# Patient Record
Sex: MALE | Race: WHITE | NOT HISPANIC OR LATINO | Employment: FULL TIME | URBAN - METROPOLITAN AREA
[De-identification: names, ages, dates, MRNs, and addresses within clinical notes are randomized per-mention and may not be internally consistent; named-entity substitution may affect disease eponyms.]

---

## 2017-05-08 ENCOUNTER — OFFICE VISIT (OUTPATIENT)
Dept: OTOLARYNGOLOGY | Facility: CLINIC | Age: 42
End: 2017-05-08
Payer: COMMERCIAL

## 2017-05-08 VITALS — BODY MASS INDEX: 31.34 KG/M2 | HEART RATE: 95 BPM | WEIGHT: 220 LBS | OXYGEN SATURATION: 95 %

## 2017-05-08 DIAGNOSIS — T16.2XXA FOREIGN BODY IN EAR, LEFT, INITIAL ENCOUNTER: Primary | ICD-10-CM

## 2017-05-08 PROCEDURE — 69200 CLEAR OUTER EAR CANAL: CPT | Mod: LT | Performed by: OTOLARYNGOLOGY

## 2017-05-08 PROCEDURE — 99202 OFFICE O/P NEW SF 15 MIN: CPT | Mod: 25 | Performed by: OTOLARYNGOLOGY

## 2017-05-08 RX ORDER — OFLOXACIN 3 MG/ML
SOLUTION/ DROPS OPHTHALMIC
Qty: 1 BOTTLE | Refills: 0 | Status: SHIPPED | OUTPATIENT
Start: 2017-05-08 | End: 2018-03-21

## 2017-05-08 RX ORDER — TOBRAMYCIN AND DEXAMETHASONE 3; 1 MG/ML; MG/ML
SUSPENSION/ DROPS OPHTHALMIC
Qty: 1 BOTTLE | Refills: 0 | Status: SHIPPED | OUTPATIENT
Start: 2017-05-08 | End: 2017-05-22

## 2017-05-08 NOTE — NURSING NOTE
"Chief Complaint   Patient presents with     Consult     Ent Problem     Bug in left ear       Initial Pulse 95  Wt 99.8 kg (220 lb)  SpO2 95%  BMI 31.34 kg/m2 Estimated body mass index is 31.34 kg/(m^2) as calculated from the following:    Height as of 1/11/16: 1.784 m (5' 10.25\").    Weight as of this encounter: 99.8 kg (220 lb).  Medication Reconciliation: complete  "

## 2017-05-08 NOTE — PROGRESS NOTES
Patient is here because of a foreign body in ear canal. It was first noticed 2 days ago. Symptoms include pain, plugged and ringing in the ears.  Attempts to remove it by using olive oil have failed. Patient would like to have this removed. Procedure and risks were explained.     He also complains of bilateral tinnitus.  He has noticed this getting worse over the last few months.  He does not have work exposure to loud noises.  He does not feel his hearing has decreased.    EXAM:   Alert, well hydrated, nontoxic appearing individual in no acute distress.  Ear L: foreign body: Fly in ear.  Left TM is inflamed.    Right ear is normal.    ASSESSMENT: Foreign body in ear canal    Anesthesia: none  Procedure: Area was visualized.   Foreign body removed by suctioning.  Patient tolerated procedure well.    Follow-up as needed.    We started him on 2 drops today due to the inflammation of his TM.  He will follow up in 2 weeks with a hearing test.  Advised him to discontinue Ibuprofen and Caffeine for the tinnitus,    Progress note was partially captured by Gloria Deelon MA and reviewed/addended by Dr. Arredondo for accuracy and content.      Niall Arredondo MD

## 2017-05-17 DIAGNOSIS — H90.8 MIXED HEARING LOSS: Primary | ICD-10-CM

## 2017-05-22 ENCOUNTER — OFFICE VISIT (OUTPATIENT)
Dept: AUDIOLOGY | Facility: CLINIC | Age: 42
End: 2017-05-22
Payer: COMMERCIAL

## 2017-05-22 ENCOUNTER — OFFICE VISIT (OUTPATIENT)
Dept: OTOLARYNGOLOGY | Facility: CLINIC | Age: 42
End: 2017-05-22
Payer: COMMERCIAL

## 2017-05-22 VITALS — OXYGEN SATURATION: 96 % | HEART RATE: 82 BPM | BODY MASS INDEX: 31.63 KG/M2 | WEIGHT: 222 LBS

## 2017-05-22 DIAGNOSIS — H90.3 SENSORINEURAL HEARING LOSS, ASYMMETRICAL: Primary | ICD-10-CM

## 2017-05-22 DIAGNOSIS — H90.3 ASYMMETRICAL SENSORINEURAL HEARING LOSS: ICD-10-CM

## 2017-05-22 DIAGNOSIS — H93.13 BILATERAL TINNITUS: ICD-10-CM

## 2017-05-22 DIAGNOSIS — H93.13 TINNITUS OF BOTH EARS: Primary | ICD-10-CM

## 2017-05-22 PROCEDURE — 92557 COMPREHENSIVE HEARING TEST: CPT | Performed by: AUDIOLOGIST

## 2017-05-22 PROCEDURE — 99213 OFFICE O/P EST LOW 20 MIN: CPT | Performed by: OTOLARYNGOLOGY

## 2017-05-22 PROCEDURE — 92567 TYMPANOMETRY: CPT | Performed by: AUDIOLOGIST

## 2017-05-22 NOTE — PROGRESS NOTES
History of Present Illness - Raffaele Guzman is a 41 year old male presenting in clinic today for a recheck on Patient presents with:  RECHECK: Follow up on bug in left ear.       Present Symptoms include: otolgia and tinnitis and they are   getting better. He does still have ringing in ears that has been going on for 1+ months    He has noise exposure at work.  He has bilateral tinnitus.       Past Medical History -   Past Medical History:   Diagnosis Date     Allergy to mold spores     4/16/13 [facility] pos. for: DM/M/G/RW     Diagnostic skin and sensitization tests 4/16/13 [facility] pos. for: DM/M/G/RW     House dust mite allergy      Hypovitaminosis D     noted 4/16/13, put on Rx     Moderate persistent asthma      Nasal polyp     noted on left side.     Seasonal allergic conjunctivitis      Seasonal allergic rhinitis        Current Medications -   Current Outpatient Prescriptions:      ofloxacin (OCUFLOX) 0.3 % ophthalmic solution, 5 drops in left ear once daily, Disp: 1 Bottle, Rfl: 0     albuterol (VENTOLIN HFA) 108 (90 BASE) MCG/ACT inhaler, Inhale 2 puffs into the lungs every 6 hours as needed for shortness of breath / dyspnea or wheezing, Disp: 1 Inhaler, Rfl: 1     naproxen (NAPROSYN) 500 MG tablet, Take 1 tablet (500 mg) by mouth 2 times daily (with meals), Disp: 30 tablet, Rfl: 0    Allergies -   Allergies   Allergen Reactions     Aspirin Unknown     As a kid       Social History -   Social History     Social History     Marital status: Single     Spouse name: N/A     Number of children: N/A     Years of education: N/A     Social History Main Topics     Smoking status: Former Smoker     Quit date: 4/16/2003     Smokeless tobacco: Never Used     Alcohol use Yes      Comment: occ. use     Drug use: No      Comment: marijuana     Sexual activity: Yes     Partners: Female     Birth control/ protection: None     Other Topics Concern     Parent/Sibling W/ Cabg, Mi Or Angioplasty Before 65f 55m? No     Social History  Narrative       Family History - No family history on file.    Review of Systems - As per HPI and PMHx, otherwise review of system review of the head and neck negative.    Physical Exam  Pulse 82  Wt 100.7 kg (222 lb)  SpO2 96%  BMI 31.63 kg/m2  BMI: Body mass index is 31.63 kg/(m^2).    General - The patient is well nourished and well developed, and appears to have good nutritional status.  Alert and oriented to person and place, answers questions and cooperates with examination appropriately.    SKIN - No suspicious lesions or rashes.  Respiration - No respiratory distress.     Head and Face - Normocephalic and atraumatic, with no gross asymmetry noted of the contour of the facial features.  The facial nerve is intact, with strong symmetric movements.    Voice and Breathing - The patient was breathing comfortably without the use of accessory muscles. There was no wheezing, stridor, or stertor.  The patients voice was clear and strong, and had appropriate pitch and quality.    Ears - Bilateral pinna and EACs with normal appearing overlying skin. Tympanic membrane intact with good mobility on pneumatic otoscopy bilaterally. Bony landmarks of the ossicular chain are normal. The tympanic membranes are normal in appearance. No retraction, perforation, or masses.  No fluid or purulence was seen in the external canal or the middle ear.     Eyes - Extraocular movements intact.  Sclera were not icteric or injected, conjunctiva were pink and moist.    Mouth - Examination of the oral cavity showed pink, healthy oral mucosa. No lesions or ulcerations noted.  The tongue was mobile and midline, and the dentition were in good condition.      Throat - The walls of the oropharynx were smooth, pink, moist, symmetric, and had no lesions or ulcerations.  The tonsillar pillars and soft palate were symmetric.  The uvula was midline on elevation.    Neck - Normal midline excursion of the laryngotracheal complex during swallowing.   Full range of motion on passive movement.  Palpation of the occipital, submental, submandibular, internal jugular chain, and supraclavicular nodes did not demonstrate any abnormal lymph nodes or masses.  The carotid pulse was palpable bilaterally.  Palpation of the thyroid was soft and smooth, with no nodules or goiter appreciated.  The trachea was mobile and midline.    Nose - External contour is symmetric, no gross deflection or scars.  Nasal mucosa is pink and moist with no abnormal mucus.  The septum was midline and non-obstructive, turbinates of normal size and position.  No polyps, masses, or purulence noted on examination.    Neuro - Nonfocal neuro exam is normal, CN 2 through 12 intact, normal gait and muscle tone.      Performed in clinic today:  Audiologic Studies - An audiogram and tympanogram were performed today as part of the evaluation and personally reviewed. The tympanogram shows normal Type A curves, with normal canal volumes and middle ear pressures.  There is no sign of eustachian tube dysfunction or middle ear effusion.  The audiogram Showed very mild loss, right worse then left.          A/P - Raffaelemaninder Guzman is a 41 year old male Patient presents with:  RECHECK: Follow up on bug in left ear.         Ear drums looked very normal on exam.  He can discontinue ear drops.      We spent the remainder of today's visit on education. Discussed were steps that can be taken to mask the noise, such as a low volume de-tuned radio, a fan in the background, and/or hearing aids.  Correlation with stress, anxiety, and depression were also discussed.  The patient was also cautioned on the numerous expensive non-pharmaceutical options that are advertised, and have no proven benefit.    The patient will follow up as necessary for worsening symptoms or changes in symptoms. I have also recommended yearly audiograms.      Raffaele should follow up in in 1 year.      At there next appointment they will need a  hearing test.    Progress note was partially captured by Gloria Deleon MA and reviewed/addended by Dr. Arredondo for accuracy and content.      Niall Arredondo MD

## 2017-05-22 NOTE — MR AVS SNAPSHOT
"              After Visit Summary   2017    Raffaele Guzman    MRN: [id number]           Patient Information     Date Of Birth          1975        Visit Information        Provider Department      2017 10:15 AM Niall Arredondo MD [facility]        Today's Diagnoses     Tinnitus of both ears    -  1    Asymmetrical sensorineural hearing loss           Follow-ups after your visit        Who to contact     If you have questions or need follow up information about today's clinic visit or your schedule please contact [facility] directly at [phone].  Normal or non-critical lab and imaging results will be communicated to you by Giv[employer]art, letter or phone within 4 business days after the clinic has received the results. If you do not hear from us within 7 days, please contact the clinic through Givkwikhart or phone. If you have a critical or abnormal lab result, we will notify you by phone as soon as possible.  Submit refill requests through [employer] or call your pharmacy and they will forward the refill request to us. Please allow 3 business days for your refill to be completed.          Additional Information About Your Visit        MyChart Information     [employer] lets you send messages to your doctor, view your test results, renew your prescriptions, schedule appointments and more. To sign up, go to [email or web address] . Click on \"Log in\" on the left side of the screen, which will take you to the Welcome page. Then click on \"Sign up Now\" on the right side of the page.     You will be asked to enter the access code listed below, as well as some personal information. Please follow the directions to create your username and password.     Your access code is: [email or web address]  Expires: 2017  4:10 PM     Your access code will  in 90 days. If you need help or a new code, please call your [facility] or [phone].        Care EveryWhere ID     This is your Care " EveryWhere ID. This could be used by other organizations to access your [place] medical records  [id number]        Your Vitals Were     Pulse Pulse Oximetry BMI (Body Mass Index)             82 96% 31.63 kg/m2          Blood Pressure from Last 3 Encounters:   12/26/16 114/80   01/04/16 120/84   11/30/15 110/80    Weight from Last 3 Encounters:   05/22/17 100.7 kg (222 lb)   05/08/17 99.8 kg (220 lb)   12/26/16 98 kg (216 lb)              Today, you had the following     No orders found for display       Primary Care Provider    None       No address on file        Thank you!     Thank you for choosing [facility]  for your care. Our goal is always to provide you with excellent care. Hearing back from our patients is one way we can continue to improve our services. Please take a few minutes to complete the written survey that you may receive in the mail after your visit with us. Thank you!             Your Updated Medication List - Protect others around you: Learn how to safely use, store and throw away your medicines at www.disposemymeds.org.          This list is accurate as of: 5/22/17  2:28 PM.  Always use your most recent med list.                   Brand Name Dispense Instructions for use    albuterol 108 (90 BASE) MCG/ACT Inhaler    VENTOLIN HFA    1 Inhaler    Inhale 2 puffs into the lungs every 6 hours as needed for shortness of breath / dyspnea or wheezing       naproxen 500 MG tablet    NAPROSYN    30 tablet    Take 1 tablet (500 mg) by mouth 2 times daily (with meals)       ofloxacin 0.3 % ophthalmic solution    OCUFLOX    1 Bottle    5 drops in left ear once daily

## 2017-05-22 NOTE — PROGRESS NOTES
Patient was referred to Audiology from ENT by Dr. Arredondo for a hearing examination. Patient reports bilateral tinnitus for many years. 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     Testing:    Otoscopy:   Otoscopic exam indicates ears are clear of cerumen bilaterally     Tympanograms:    RIGHT: hyper compliance      LEFT:   normal eardrum mobility    Thresholds:   Pure Tone Thresholds assessed using conventional audiometry with good  reliability from 250-8000 Hz bilaterally using insert earphones     RIGHT:  mild and moderate sensorineural hearing loss    LEFT:    normal hearing sensitivity through 2000 Hz then a mild to moderate sensorineural hearing loss    Speech Reception Threshold:    RIGHT: 30 dB HL    LEFT:   15 dB HL    Word Recognition Score:     RIGHT: 100% at 70 dB HL using NU-6 recorded word list.    LEFT:   100% at 55 dB HL using NU-6 recorded word list.    Discussed results with the patient.     Patient was returned to ENT for follow up.     Teja López CCC-A  Licensed Audiologist  5/22/2017

## 2017-05-22 NOTE — NURSING NOTE
"Chief Complaint   Patient presents with     RECHECK     Follow up on bug in left ear.        Initial Pulse 82  Wt 100.7 kg (222 lb)  SpO2 96%  BMI 31.63 kg/m2 Estimated body mass index is 31.63 kg/(m^2) as calculated from the following:    Height as of 1/11/16: 1.784 m (5' 10.25\").    Weight as of this encounter: 100.7 kg (222 lb).  Medication Reconciliation: complete  "

## 2017-06-15 ENCOUNTER — TELEPHONE (OUTPATIENT)
Dept: FAMILY MEDICINE | Facility: OTHER | Age: 42
End: 2017-06-15

## 2017-06-15 DIAGNOSIS — J45.40 MODERATE PERSISTENT ASTHMA WITHOUT COMPLICATION: ICD-10-CM

## 2017-06-15 RX ORDER — ALBUTEROL SULFATE 90 UG/1
2 AEROSOL, METERED RESPIRATORY (INHALATION) EVERY 6 HOURS PRN
Qty: 1 INHALER | Refills: 0 | Status: SHIPPED | OUTPATIENT
Start: 2017-06-15 | End: 2017-07-20

## 2017-06-15 NOTE — TELEPHONE ENCOUNTER
Reason for Call:  Medication or medication refill:    Do you use a [facility]?  Name of the pharmacy and phone number for the current request:  [facility]    Name of the medication requested: albuterol (VENTOLIN HFA) 108 (90 BASE) MCG/ACT inhaler    Other request: N/A    Can we leave a detailed message on this number? YES    Phone number patient can be reached at: Cell number on file:    Telephone Information:   Mobile [phone]       Best Time: Anytime     Call taken on 6/15/2017 at 11:03 AM by Krys Kitchen

## 2017-06-15 NOTE — TELEPHONE ENCOUNTER
"I called patient and notified of message below.  He said he was very upset as he said last time he was out of medication we would not refill it and he couldn't breath.  He said he did come in for a visit and the provider did not even listen to his chest.  He said if he come in for a follow up if he gets a full exam and not just \"a paper to document \"  He said he would like to talk to manager about this before he schedules.  I offered to transfer him but he said he will call back to take care of this when it come closer to his appointment time.      I did tell him they have to follow guidelines for the medication with the ACT form however he feels that if he was being seen for his Asthma he should at least have his lungs listened to.  I acknowledge is concern and offered him to see another provider or speak with manager however he said again that he will take care of that when he is closer to running out if his inhaler again.    "

## 2017-06-15 NOTE — TELEPHONE ENCOUNTER
Medication is being filled for 1 time refill only due to:  Patient needs to be seen because due for asthma follow up..     Faith Lino, RN, BSN

## 2017-07-20 DIAGNOSIS — J45.40 MODERATE PERSISTENT ASTHMA WITHOUT COMPLICATION: ICD-10-CM

## 2017-07-20 RX ORDER — ALBUTEROL SULFATE 90 UG/1
AEROSOL, METERED RESPIRATORY (INHALATION)
Qty: 8.5 G | Refills: 0 | Status: SHIPPED | OUTPATIENT
Start: 2017-07-20 | End: 2017-08-28

## 2017-07-20 NOTE — TELEPHONE ENCOUNTER
albuterol (VENTOLIN HFA) 108 (90 BASE) MCG/ACT Inhaler 1 Inhaler 0 6/15/2017  No   Sig: Inhale 2 puffs into the lungs every 6 hours as needed for shortness of breath / dyspnea or wheezing   Class: E-Prescribe   Route: Inhalation   Order: [id number]     Pharmacy   [facility] [address] [place] [address] [id number] AT [facility]  [address]       Last Office Visit with [facility], [facility] or [facility] prescribing provider:  12.26.16   Future Office Visit:       Date of Last Asthma Action Plan Letter:   Asthma Action Plan Q1 Year    Topic Date Due     Asthma Action Plan - yearly  12/26/2017      Asthma Control Test:   ACT Total Scores 12/26/2016   ACT TOTAL SCORE -   ASTHMA ER VISITS -   ASTHMA HOSPITALIZATIONS -   ACT TOTAL SCORE (Goal Greater than or Equal to 20) 23   In the past 12 months, how many times did you visit the emergency room for your asthma without being admitted to the hospital? 0   In the past 12 months, how many times were you hospitalized overnight because of your asthma? 0       Date of Last Spirometry Test:   No results found for this or any previous visit.

## 2017-07-20 NOTE — TELEPHONE ENCOUNTER
albuterol (VENTOLIN HFA) 108 (90 BASE) MCG/ACT Inhaler  Routing refill request to provider for review/approval because:  Alice given x1 and patient did not follow up, please advise  Due for OV     Leia Arguelles RN, BSN

## 2017-07-20 NOTE — TELEPHONE ENCOUNTER
Patient called to check on the status of this refill. (inhaler)  He is out and he would like this today.  He would like a call when this gets called in [phone]

## 2017-08-28 DIAGNOSIS — J45.40 MODERATE PERSISTENT ASTHMA WITHOUT COMPLICATION: ICD-10-CM

## 2017-08-28 NOTE — TELEPHONE ENCOUNTER
ALBUTEROL 108 (90 BASE) MCG/ACT inhaler       Last Written Prescription Date: 7/20/17  Last Fill Quantity: 8.5g, # refills: 0    Last Office Visit with [facility], [facility] or [facility] prescribing provider:  12/26/16   Future Office Visit:    Next 5 appointments (look out 90 days)     Aug 31, 2017  2:00 PM CDT   [facility] Uche Crespo PA-C   [facility]    [address] [place] [id number] [phone]                   Date of Last Asthma Action Plan Letter:   Asthma Action Plan Q1 Year    Topic Date Due     Asthma Action Plan - yearly  12/26/2017      Asthma Control Test:   ACT Total Scores 12/26/2016   ACT TOTAL SCORE -   ASTHMA ER VISITS -   ASTHMA HOSPITALIZATIONS -   ACT TOTAL SCORE (Goal Greater than or Equal to 20) 23   In the past 12 months, how many times did you visit the emergency room for your asthma without being admitted to the hospital? 0   In the past 12 months, how many times were you hospitalized overnight because of your asthma? 0       Date of Last Spirometry Test:   No results found for this or any previous visit.

## 2017-08-29 NOTE — TELEPHONE ENCOUNTER
Routing refill request to provider for review/approval because:  Alice given x 2 and patient did not follow up    Faith Lino, RN, BSN

## 2017-08-29 NOTE — PROGRESS NOTES
SUBJECTIVE:                                                    Raffaele Guzman is a 42 year old male who presents to clinic today for the following health issues:    HPI      Concern - mass  Onset: Years    Description:   Located on Rt shoulder    Intensity: mild    Progression of Symptoms:  worsening    Accompanying Signs & Symptoms:  NA    Previous history of similar problem:   Yes    Has a cyst over the right shoulder that he would like removed. He has squeezed white material out of it in the past but it keeps coming back.      Asthma Follow-Up    Was ACT completed today?    Yes    ACT Total Scores 8/31/2017   ACT TOTAL SCORE -   ASTHMA ER VISITS -   ASTHMA HOSPITALIZATIONS -   ACT TOTAL SCORE (Goal Greater than or Equal to 20) 21   In the past 12 months, how many times did you visit the emergency room for your asthma without being admitted to the hospital? 0   In the past 12 months, how many times were you hospitalized overnight because of your asthma? 0       Recent asthma triggers that patient is dealing with: Allergies      Symptoms are worse with pollen and allergies. He uses his ProAir inhaler as needed, usually twice per week. He does not take a daily antihistamine.       Problem list and histories reviewed & adjusted, as indicated.  Additional history: none    Red spot on left thigh      Duration: Over a year    Description (location/character/radiation): Looks like a blood spot. Pt would like this checked.     Intensity:  mild    Accompanying signs and symptoms: NA     He has picked at it multiple times and it bleeds a lot. It has been there for years and has not changes in size or color.       ROS:  GENERAL: Denies fever, fatigue, weakness, weight gain, or weight loss.  CARDIOVASCULAR: Denies chest pain, shortness of breath, irregular heartbeats,  palpitations, or edema.  RESPIRATORY: Denies cough, hemoptysis, and shortness of breath.  SKIN: +Cyst on right shoulder, spot on left thigh.  NEUROLOGIC:  "Denies headache, fainting, dizziness, memory loss, numbness, tingling, or seizures.  OBJECTIVE:     /80 (BP Location: Right arm, Patient Position: Sitting, Cuff Size: Adult Regular)  Pulse 87  Temp 97.5  F (36.4  C) (Temporal)  Ht 5' 10\" (1.778 m)  Wt 213 lb (96.6 kg)  SpO2 98%  BMI 30.56 kg/m2  Body mass index is 30.56 kg/(m^2).  GENERAL: healthy, alert and no distress  RESP: lungs clear to auscultation - no rales, rhonchi or wheezes  CV: regular rate and rhythm, normal S1 S2, no S3 or S4, no murmur, click or rub  SKIN: Large sebaceous cyst, ~2-3 cm in diameter, with central pore over right scapular area. No tenderness, drainage or erythema. There is a 0.5 mm round purple, blanchable papule over the left anterior thigh with slight bleeding.     ASSESSMENT/PLAN:       ICD-10-CM    1. Moderate persistent asthma without complication J45.40    2. Hemangioma D18.00    3. Sebaceous cyst L72.3    4. Hypovitaminosis D E55.9 Vitamin D Deficiency   5. CARDIOVASCULAR SCREENING; LDL GOAL LESS THAN 160 Z13.6 Lipid panel reflex to direct LDL   6. Screening for diabetes mellitus Z13.1 Basic metabolic panel  (Ca, Cl, CO2, Creat, Gluc, K, Na, BUN)         1. Stable, continue ProAir as needed. Follow up in 6 months.     2. Lesion on left thigh is consistent with a benign hemangioma. I encouraged him to avoid picking at the area as it will continue to bleed. If he notices any change in size, shape, or color, he will let me know.     3. He would like this cyst removed so I recommend he schedule an appointment for removal. I will need a 7 mm punch with suture kit as well as a forceps and curved scissors. Also have a 15 blade available.     4. Will recheck vitamin D level today.    5-6. Updated fasting labs ordered.       Uche Crespo PA-C  [facility]"

## 2017-08-30 RX ORDER — ALBUTEROL SULFATE 90 UG/1
AEROSOL, METERED RESPIRATORY (INHALATION)
Qty: 8.5 G | Refills: 3 | Status: SHIPPED | OUTPATIENT
Start: 2017-08-30 | End: 2018-01-09

## 2017-08-31 ENCOUNTER — OFFICE VISIT (OUTPATIENT)
Dept: FAMILY MEDICINE | Facility: OTHER | Age: 42
End: 2017-08-31
Payer: COMMERCIAL

## 2017-08-31 VITALS
BODY MASS INDEX: 30.49 KG/M2 | TEMPERATURE: 97.5 F | OXYGEN SATURATION: 98 % | DIASTOLIC BLOOD PRESSURE: 80 MMHG | HEART RATE: 87 BPM | SYSTOLIC BLOOD PRESSURE: 120 MMHG | HEIGHT: 70 IN | WEIGHT: 213 LBS

## 2017-08-31 DIAGNOSIS — J45.40 MODERATE PERSISTENT ASTHMA WITHOUT COMPLICATION: Primary | ICD-10-CM

## 2017-08-31 DIAGNOSIS — L72.3 SEBACEOUS CYST: ICD-10-CM

## 2017-08-31 DIAGNOSIS — D18.00 HEMANGIOMA: ICD-10-CM

## 2017-08-31 DIAGNOSIS — Z13.1 SCREENING FOR DIABETES MELLITUS: ICD-10-CM

## 2017-08-31 DIAGNOSIS — E55.9 HYPOVITAMINOSIS D: ICD-10-CM

## 2017-08-31 DIAGNOSIS — Z13.6 CARDIOVASCULAR SCREENING; LDL GOAL LESS THAN 160: ICD-10-CM

## 2017-08-31 LAB
ANION GAP SERPL CALCULATED.3IONS-SCNC: 11 MMOL/L (ref 3–14)
BUN SERPL-MCNC: 10 MG/DL (ref 7–30)
CALCIUM SERPL-MCNC: 8.8 MG/DL (ref 8.5–10.1)
CHLORIDE SERPL-SCNC: 105 MMOL/L (ref 94–109)
CHOLEST SERPL-MCNC: 221 MG/DL
CO2 SERPL-SCNC: 25 MMOL/L (ref 20–32)
CREAT SERPL-MCNC: 0.92 MG/DL (ref 0.66–1.25)
GFR SERPL CREATININE-BSD FRML MDRD: 90 ML/MIN/1.7M2
GLUCOSE SERPL-MCNC: 92 MG/DL (ref 70–99)
HDLC SERPL-MCNC: 66 MG/DL
LDLC SERPL CALC-MCNC: 134 MG/DL
NONHDLC SERPL-MCNC: 155 MG/DL
POTASSIUM SERPL-SCNC: 4.1 MMOL/L (ref 3.4–5.3)
SODIUM SERPL-SCNC: 141 MMOL/L (ref 133–144)
TRIGL SERPL-MCNC: 103 MG/DL

## 2017-08-31 PROCEDURE — 82306 VITAMIN D 25 HYDROXY: CPT | Performed by: PHYSICIAN ASSISTANT

## 2017-08-31 PROCEDURE — 80061 LIPID PANEL: CPT | Performed by: PHYSICIAN ASSISTANT

## 2017-08-31 PROCEDURE — 80048 BASIC METABOLIC PNL TOTAL CA: CPT | Performed by: PHYSICIAN ASSISTANT

## 2017-08-31 PROCEDURE — 99214 OFFICE O/P EST MOD 30 MIN: CPT | Performed by: PHYSICIAN ASSISTANT

## 2017-08-31 PROCEDURE — 36415 COLL VENOUS BLD VENIPUNCTURE: CPT | Performed by: PHYSICIAN ASSISTANT

## 2017-08-31 ASSESSMENT — PAIN SCALES - GENERAL: PAINLEVEL: NO PAIN (0)

## 2017-08-31 NOTE — MR AVS SNAPSHOT
"              After Visit Summary   8/31/2017    Raffaele Guzman    MRN: [id number]           Patient Information     Date Of Birth          1975        Visit Information        Provider Department      8/31/2017 10:00 AM Uche Crespo PA-C [facility]        Today's Diagnoses     Moderate persistent asthma without complication    -  1    Hemangioma        Sebaceous cyst        Hypovitaminosis D        CARDIOVASCULAR SCREENING; LDL GOAL LESS THAN 160        Screening for diabetes mellitus          Care Instructions    The spot on your leg is called a hemangioma and is nothing to be concerned about.  If it changes in size, shape or color, let me know.  Do not pick at the area.    You can schedule an appointment to remove the cyst on your back.      Follow up in 6 months for diabetic recheck.    I will let you know about your lab results.              Follow-ups after your visit        Who to contact     If you have questions or need follow up information about today's clinic visit or your schedule please contact [facility] directly at [phone].  Normal or non-critical lab and imaging results will be communicated to you by KienVehart, letter or phone within 4 business days after the clinic has received the results. If you do not hear from us within 7 days, please contact the clinic through Cylon Controlst or phone. If you have a critical or abnormal lab result, we will notify you by phone as soon as possible.  Submit refill requests through Ocular Therapeutix or call your pharmacy and they will forward the refill request to us. Please allow 3 business days for your refill to be completed.          Additional Information About Your Visit        KienVehart Information     [employer] lets you send messages to your doctor, view your test results, renew your prescriptions, schedule appointments and more. To sign up, go to [email or web address] . Click on \"Log in\" on the left side of the screen, which " "will take you to the Welcome page. Then click on \"Sign up Now\" on the right side of the page.     You will be asked to enter the access code listed below, as well as some personal information. Please follow the directions to create your username and password.     Your access code is: [email or web address]  Expires: 2017 10:30 AM     Your access code will  in 90 days. If you need help or a new code, please call your [place] clinic or [phone].        Care EveryWhere ID     This is your Care EveryWhere ID. This could be used by other organizations to access your [place] medical records  [id number]        Your Vitals Were     Pulse Temperature Height Pulse Oximetry BMI (Body Mass Index)       87 97.5  F (36.4  C) (Temporal) 5' 10\" (1.778 m) 98% 30.56 kg/m2        Blood Pressure from Last 3 Encounters:   17 120/80   16 114/80   16 120/84    Weight from Last 3 Encounters:   17 213 lb (96.6 kg)   17 222 lb (100.7 kg)   17 220 lb (99.8 kg)              We Performed the Following     Basic metabolic panel  (Ca, Cl, CO2, Creat, Gluc, K, Na, BUN)     Lipid panel reflex to direct LDL     Vitamin D Deficiency        Primary Care Provider Office Phone # Fax #    Uche Crespo PA-C [phone]       [address] [place] [address]        Equal Access to Services     [facility]: Hadii aad ku hadasho Sosimiali, waaxda luqadaha, qaybta kaalmada adesandiyatimmy, eliel vidal . So [facility] [phone].    ATENCIÓN: Si habla español, tiene a beavers disposición servicios gratuitos de asistencia lingüística. Llame al [phone].    We comply with applicable federal civil rights laws and [place] laws. We do not discriminate on the basis of race, color, national origin, age, disability sex, sexual orientation or gender identity.            Thank you!     Thank you for choosing [facility]  for your care. Our goal is always to " provide you with excellent care. Hearing back from our patients is one way we can continue to improve our services. Please take a few minutes to complete the written survey that you may receive in the mail after your visit with us. Thank you!             Your Updated Medication List - Protect others around you: Learn how to safely use, store and throw away your medicines at www.disposemymeds.org.          This list is accurate as of: 8/31/17 10:30 AM.  Always use your most recent med list.                   Brand Name Dispense Instructions for use Diagnosis    naproxen 500 MG tablet    NAPROSYN    30 tablet    Take 1 tablet (500 mg) by mouth 2 times daily (with meals)    Rotator cuff strain, left, initial encounter       ofloxacin 0.3 % ophthalmic solution    OCUFLOX    1 Bottle    5 drops in left ear once daily    Foreign body in ear, left, initial encounter       PROAIR  (90 BASE) MCG/ACT Inhaler   Generic drug:  albuterol     8.5 g    INHALE 2 PUFFS INTO THE LUNGS EVERY 6 HOURS AS NEEDED FOR SHORTNESS OF BREATH, DIFFICULT BREATHING, OR WHEEZING    Moderate persistent asthma without complication

## 2017-08-31 NOTE — NURSING NOTE
"Chief Complaint   Patient presents with     Establish Care     Refill Request     Mass     Panel Management     ACT, mychart, height       Initial /80 (BP Location: Right arm, Patient Position: Sitting, Cuff Size: Adult Regular)  Pulse 87  Temp 97.5  F (36.4  C) (Temporal)  Ht 5' 10\" (1.778 m)  Wt 213 lb (96.6 kg)  SpO2 98%  BMI 30.56 kg/m2 Estimated body mass index is 30.56 kg/(m^2) as calculated from the following:    Height as of this encounter: 5' 10\" (1.778 m).    Weight as of this encounter: 213 lb (96.6 kg).  Medication Reconciliation: complete   Jerome Mccray MA  August 31, 2017      "

## 2017-08-31 NOTE — PATIENT INSTRUCTIONS
The spot on your leg is called a hemangioma and is nothing to be concerned about.  If it changes in size, shape or color, let me know.  Do not pick at the area.    You can schedule an appointment to remove the cyst on your back.      Follow up in 6 months for diabetic recheck.    I will let you know about your lab results.

## 2017-09-01 LAB — DEPRECATED CALCIDIOL+CALCIFEROL SERPL-MC: 28 UG/L (ref 20–75)

## 2017-09-01 ASSESSMENT — ASTHMA QUESTIONNAIRES: ACT_TOTALSCORE: 21

## 2017-09-05 NOTE — PROGRESS NOTES
"  SUBJECTIVE:                                                    Raffaele Guzman is a 42 year old male who presents to clinic today for the following health issues:      HPI    Patient in today for cyst removal.    {additional problems for roomer to add, delete if none:478298}    Problem list and histories reviewed & adjusted, as indicated.  Additional history: {NONE - AS DOCUMENTED:268685::\"as documented\"}    {ACUTE Problem SUPERLIST - brief histories:767419}    {HIST REVIEW/ LINKS 2:820907}    {PROVIDER CHARTING PREFERENCE:740832}  "

## 2017-09-07 ENCOUNTER — OFFICE VISIT (OUTPATIENT)
Dept: FAMILY MEDICINE | Facility: OTHER | Age: 42
End: 2017-09-07
Payer: COMMERCIAL

## 2017-09-07 VITALS
DIASTOLIC BLOOD PRESSURE: 83 MMHG | SYSTOLIC BLOOD PRESSURE: 147 MMHG | BODY MASS INDEX: 30.42 KG/M2 | WEIGHT: 212 LBS | HEART RATE: 103 BPM | TEMPERATURE: 98.3 F | OXYGEN SATURATION: 98 % | RESPIRATION RATE: 16 BRPM

## 2017-09-07 DIAGNOSIS — L72.3 SEBACEOUS CYST: Primary | ICD-10-CM

## 2017-09-07 PROCEDURE — 11402 EXC TR-EXT B9+MARG 1.1-2 CM: CPT | Performed by: PHYSICIAN ASSISTANT

## 2017-09-07 RX ORDER — CEPHALEXIN 500 MG/1
500 CAPSULE ORAL 2 TIMES DAILY
Qty: 10 CAPSULE | Refills: 0 | Status: SHIPPED | OUTPATIENT
Start: 2017-09-07 | End: 2017-09-12

## 2017-09-07 NOTE — PROGRESS NOTES
Subjective: Raffaele Guzman a 42 year old male who presents today for cyst removal. The cyst is located on the right upper back, number 1 and measures 2 cm. The patient reports the lesion is asymptomatic and denies other significant symptoms on ROS. Medications reviewed.    Pause for the cause has been completed prior to the prceedure.   1. Raffaele was identified by both name and date of birth   2. The correct site was identified   3. Site was marked by provider    4. Written informed consent correct and signed or verbal authorization  to proceed was obtained   5. Verifed necessary supplies, equipment, and diagnostics are available    6. Time out was performed immediately prior to procedure    Objective:  /83 (BP Location: Right arm, Patient Position: Chair, Cuff Size: Adult Regular)  Pulse 103  Temp 98.3  F (36.8  C) (Oral)  Resp 16  Wt 212 lb (96.2 kg)  SpO2 98%  BMI 30.42 kg/m2     The cyst is of the above mentioned size and location and is not draining with no erythema, pain, or warmth. The area was prepped with betadine swabs and appropriately anesthetized with 1% lidocaine with epinephrine. Using the usual technique, a 2 cm horizontal incision was made and complete cyst incision and removal was performed. The entire cyst wall was able to be removed. Bleeding was minimal and hemostasis was achieved with firm pressure. Three, 3-0 Vicryl sutures were placed in the fascial layer followed by 6 superficial, 4-0 Ethilon simple interrupted sutures to close the wound. An appropriate dressing was applied following Bacitracin ointment. The procedure was well tolerated and without complications.     He also had a small closed comedone below the cyst that was easily removed with a comedone extractor.     Assessment: sebaceous cyst    Plan: Follow up: The specimen is labelled and sent to pathology for evaluation., Return for suture removal in 10 days.. Wound care instructions provided.  Patient was  instructed to be alert for any signs of cutaneous infection. He was prescribed a 5 day course of Keflex for infection prevention.       Wound Care Instructions     1.  Keep area dry today.     2.  Starting tomorrow wash gently with soap and water once daily (with a wash cloth) but do not allow direct water pressure over the area until the suturse are out.     3.  Apply Vaseline or antibiotic ointment to the area and cover with a bandage if desired.  Do not let it dry out and form a scab as this will make the resulting scar more noticeable.     4. Protect the area from sun for up to one year afterward as the scar is continuing to remodel.  Sun exposure will also make the resulting scar more noticeable.     5.  Call if the area is very red, tender, has a discharge or is very itchy while healing, or if you have any other questions.  These may be signs of early infection or allergy.    6. Take the antibiotic twice daily for 5 days.    7. Avoid any vigorous activity until the suture are removed.

## 2017-09-07 NOTE — PATIENT INSTRUCTIONS
Wound Care Instructions     1.  Keep area dry today.     2.  Starting tomorrow wash gently with soap and water once daily (with a wash cloth) but do not allow direct water pressure over the area until the suturse are out.     3.  Apply Vaseline or antibiotic ointment to the area and cover with a bandage if desired.  Do not let it dry out and form a scab as this will make the resulting scar more noticeable.     4. Protect the area from sun for up to one year afterward as the scar is continuing to remodel.  Sun exposure will also make the resulting scar more noticeable.     5.  Call if the area is very red, tender, has a discharge or is very itchy while healing, or if you have any other questions.  These may be signs of early infection or allergy.    6. Take the antibiotic twice daily for 5 days.    7. Avoid any vigorous activity until the suture are removed.

## 2017-09-07 NOTE — MR AVS SNAPSHOT
After Visit Summary   9/7/2017    Raffaele Guzman    MRN: [id number]           Patient Information     Date Of Birth          1975        Visit Information        Provider Department      9/7/2017 2:00 PM Uche Crespo PA-C; NL PROC ROOM 1ST FLOOR [facility]        Today's Diagnoses     Sebaceous cyst    -  1      Care Instructions    Wound Care Instructions     1.  Keep area dry today.     2.  Starting tomorrow wash gently with soap and water once daily (with a wash cloth) but do not allow direct water pressure over the area until the suturse are out.     3.  Apply Vaseline or antibiotic ointment to the area and cover with a bandage if desired.  Do not let it dry out and form a scab as this will make the resulting scar more noticeable.     4. Protect the area from sun for up to one year afterward as the scar is continuing to remodel.  Sun exposure will also make the resulting scar more noticeable.     5.  Call if the area is very red, tender, has a discharge or is very itchy while healing, or if you have any other questions.  These may be signs of early infection or allergy.    6. Take the antibiotic twice daily for 5 days.    7. Avoid any vigorous activity until the suture are removed.                       Follow-ups after your visit        Who to contact     If you have questions or need follow up information about today's clinic visit or your schedule please contact [facility] directly at [phone].  Normal or non-critical lab and imaging results will be communicated to you by MyChart, letter or phone within 4 business days after the clinic has received the results. If you do not hear from us within 7 days, please contact the clinic through MyChart or phone. If you have a critical or abnormal lab result, we will notify you by phone as soon as possible.  Submit refill requests through Netbooks or call your pharmacy and they will forward the refill  request to us. Please allow 3 business days for your refill to be completed.          Additional Information About Your Visit        91JinRonghart Information     [employer] gives you secure access to your electronic health record. If you see a primary care provider, you can also send messages to your care team and make appointments. If you have questions, please call your primary care clinic.  If you do not have a primary care provider, please call [phone] and they will assist you.        Care EveryWhere ID     This is your Care EveryWhere ID. This could be used by other organizations to access your [place] medical records  [id number]        Your Vitals Were     Pulse Temperature Respirations Pulse Oximetry BMI (Body Mass Index)       103 98.3  F (36.8  C) (Oral) 16 98% 30.42 kg/m2        Blood Pressure from Last 3 Encounters:   09/07/17 147/83   08/31/17 120/80   12/26/16 114/80    Weight from Last 3 Encounters:   09/07/17 212 lb (96.2 kg)   08/31/17 213 lb (96.6 kg)   05/22/17 222 lb (100.7 kg)              We Performed the Following     1.1-2CM TRUNK,ARM,LEG          Today's Medication Changes          These changes are accurate as of: 9/7/17  3:16 PM.  If you have any questions, ask your nurse or doctor.               Start taking these medicines.        Dose/Directions    cephALEXin 500 MG capsule   Commonly known as:  KEFLEX   Used for:  Sebaceous cyst   Started by:  Uche Crespo PA-C        Dose:  500 mg   Take 1 capsule (500 mg) by mouth 2 times daily for 5 days   Quantity:  10 capsule   Refills:  0            Where to get your medicines      These medications were sent to [facility] [address] [facility] [address]  AT [facility] [address]  [address] [facility] [address]     Phone:  [phone]     cephALEXin 500 MG capsule                Primary Care Provider Office Phone # Fax #    Uche Crespo PA-C [phone]       [address] [place] [address]  [address]  [facility] [address]        Equal Access to Services     [facility] JACLYN : Hadii ondina wick hadelvirao Sosimiali, waaxda luqadaha, qaybta kaalmatimmy lundberg, eliel gracewyattalonzo crowe. So [facility] [phone].    ATENCIÓN: Si habla español, tiene a beavers disposición servicios gratuitos de asistencia lingüística. Kaylaame al [phone].    We comply with applicable federal civil rights laws and [place] laws. We do not discriminate on the basis of race, color, national origin, age, disability sex, sexual orientation or gender identity.            Thank you!     Thank you for choosing [facility]  for your care. Our goal is always to provide you with excellent care. Hearing back from our patients is one way we can continue to improve our services. Please take a few minutes to complete the written survey that you may receive in the mail after your visit with us. Thank you!             Your Updated Medication List - Protect others around you: Learn how to safely use, store and throw away your medicines at www.disposemymeds.org.          This list is accurate as of: 9/7/17  3:16 PM.  Always use your most recent med list.                   Brand Name Dispense Instructions for use Diagnosis    cephALEXin 500 MG capsule    KEFLEX    10 capsule    Take 1 capsule (500 mg) by mouth 2 times daily for 5 days    Sebaceous cyst       naproxen 500 MG tablet    NAPROSYN    30 tablet    Take 1 tablet (500 mg) by mouth 2 times daily (with meals)    Rotator cuff strain, left, initial encounter       ofloxacin 0.3 % ophthalmic solution    OCUFLOX    1 Bottle    5 drops in left ear once daily    Foreign body in ear, left, initial encounter       PROAIR  (90 BASE) MCG/ACT Inhaler   Generic drug:  albuterol     8.5 g    INHALE 2 PUFFS INTO THE LUNGS EVERY 6 HOURS AS NEEDED FOR SHORTNESS OF BREATH, DIFFICULT BREATHING, OR WHEEZING    Moderate persistent asthma without complication

## 2017-09-07 NOTE — NURSING NOTE
"Chief Complaint   Patient presents with     Lesion Removal     Panel Management     flu, ACT       Initial /83 (BP Location: Right arm, Patient Position: Chair, Cuff Size: Adult Regular)  Pulse 103  Temp 98.3  F (36.8  C) (Oral)  Resp 16  Wt 212 lb (96.2 kg)  SpO2 98%  BMI 30.42 kg/m2 Estimated body mass index is 30.42 kg/(m^2) as calculated from the following:    Height as of 8/31/17: 5' 10\" (1.778 m).    Weight as of this encounter: 212 lb (96.2 kg).  Medication Reconciliation: complete     Christiane Lawton CMA      "

## 2017-09-08 ASSESSMENT — ASTHMA QUESTIONNAIRES: ACT_TOTALSCORE: 21

## 2017-09-18 ENCOUNTER — ALLIED HEALTH/NURSE VISIT (OUTPATIENT)
Dept: FAMILY MEDICINE | Facility: OTHER | Age: 42
End: 2017-09-18
Payer: COMMERCIAL

## 2017-09-18 DIAGNOSIS — Z48.02 ENCOUNTER FOR REMOVAL OF SUTURES: Primary | ICD-10-CM

## 2017-09-18 PROCEDURE — 99207 ZZC NO CHARGE NURSE ONLY: CPT

## 2017-09-18 NOTE — PROGRESS NOTES
Raffaele presents to the clinic today for  removal of sutures.  The patient has had the sutures in place for 11 days.    There has been no history of infection or drainage.    O: 6 sutures are seen located on the upper back.  The wound is healing well with no signs of infection.    Tetanus status is up to date.    A: Sutures removal. Area re-enforced with steri-strips and a large knee band aid for today, patient to remove band aid prior to next shower and allow steri-strips to fall off themselves.    P:  All sutures were easily removed today.  Routine wound care discussed.  The patient will follow up as needed.      Leia Arguelles, RN, BSN

## 2017-09-18 NOTE — MR AVS SNAPSHOT
After Visit Summary   9/18/2017    Raffaele Guzman    MRN: [id number]           Patient Information     Date Of Birth          1975        Visit Information        Provider Department      9/18/2017 2:00 PM NL RN TEAM A, JOEL [facility]        Today's Diagnoses     Encounter for removal of sutures    -  1       Follow-ups after your visit        Your next 10 appointments already scheduled     Sep 18, 2017  2:00 PM CDT   Nurse Only with NL RN TEAM A, JOEL   [facility] ([facility])    [address] [address]  [facility] [id number] [phone]              Who to contact     If you have questions or need follow up information about today's clinic visit or your schedule please contact [facility] directly at [phone].  Normal or non-critical lab and imaging results will be communicated to you by ParStreamhart, letter or phone within 4 business days after the clinic has received the results. If you do not hear from us within 7 days, please contact the clinic through ParStreamhart or phone. If you have a critical or abnormal lab result, we will notify you by phone as soon as possible.  Submit refill requests through Neo [employer]M or call your pharmacy and they will forward the refill request to us. Please allow 3 business days for your refill to be completed.          Additional Information About Your Visit        MyChart Information     [employer]o [employer] gives you secure access to your electronic health record. If you see a primary care provider, you can also send messages to your care team and make appointments. If you have questions, please call your primary care clinic.  If you do not have a primary care provider, please call [phone] and they will assist you.        Care EveryWhere ID     This is your Care EveryWhere ID. This could be used by other organizations to access your [place] medical records  [id number]         Blood Pressure from Last 3  Encounters:   09/07/17 147/83   08/31/17 120/80   12/26/16 114/80    Weight from Last 3 Encounters:   09/07/17 212 lb (96.2 kg)   08/31/17 213 lb (96.6 kg)   05/22/17 222 lb (100.7 kg)              Today, you had the following     No orders found for display       Primary Care Provider Office Phone # Fax #    Uche Crespo PA-C [phone]       [address] [place] [address] [address]  [facility] [address]        Equal Access to Services     [facility]: Hadii aad ku hadasho Soomaali, waaxda luqadaha, qaybta kaalmada adeegyada, eliel vidal . So [facility] [phone].    ATENCIÓN: Si habla español, tiene a beavers disposición servicios gratuitos de asistencia lingüística. [facility] [phone].    We comply with applicable federal civil rights laws and [place] laws. We do not discriminate on the basis of race, color, national origin, age, disability sex, sexual orientation or gender identity.            Thank you!     Thank you for choosing [facility]  for your care. Our goal is always to provide you with excellent care. Hearing back from our patients is one way we can continue to improve our services. Please take a few minutes to complete the written survey that you may receive in the mail after your visit with us. Thank you!             Your Updated Medication List - Protect others around you: Learn how to safely use, store and throw away your medicines at www.disposemymeds.org.          This list is accurate as of: 9/18/17  1:52 PM.  Always use your most recent med list.                   Brand Name Dispense Instructions for use Diagnosis    naproxen 500 MG tablet    NAPROSYN    30 tablet    Take 1 tablet (500 mg) by mouth 2 times daily (with meals)    Rotator cuff strain, left, initial encounter       ofloxacin 0.3 % ophthalmic solution    OCUFLOX    1 Bottle    5 drops in left ear once daily    Foreign body in ear, left, initial encounter       PROAIR  (90 BASE)  MCG/ACT Inhaler   Generic drug:  albuterol     8.5 g    INHALE 2 PUFFS INTO THE LUNGS EVERY 6 HOURS AS NEEDED FOR SHORTNESS OF BREATH, DIFFICULT BREATHING, OR WHEEZING    Moderate persistent asthma without complication

## 2017-12-14 DIAGNOSIS — J45.40 MODERATE PERSISTENT ASTHMA WITHOUT COMPLICATION: ICD-10-CM

## 2017-12-18 ENCOUNTER — OFFICE VISIT (OUTPATIENT)
Dept: SURGERY | Facility: OTHER | Age: 42
End: 2017-12-18
Payer: COMMERCIAL

## 2017-12-18 VITALS — BODY MASS INDEX: 29.84 KG/M2 | TEMPERATURE: 97.2 F | HEART RATE: 68 BPM | WEIGHT: 208 LBS

## 2017-12-18 DIAGNOSIS — Z98.890 S/P VENTRAL HERNIORRHAPHY: ICD-10-CM

## 2017-12-18 DIAGNOSIS — M62.08 DIASTASIS RECTI: Primary | ICD-10-CM

## 2017-12-18 DIAGNOSIS — Z87.19 S/P VENTRAL HERNIORRHAPHY: ICD-10-CM

## 2017-12-18 PROCEDURE — 99214 OFFICE O/P EST MOD 30 MIN: CPT | Performed by: SPECIALIST

## 2017-12-18 RX ORDER — ALBUTEROL SULFATE 90 UG/1
AEROSOL, METERED RESPIRATORY (INHALATION)
Qty: 18 G | Refills: 1 | Status: SHIPPED | OUTPATIENT
Start: 2017-12-18 | End: 2019-06-17

## 2017-12-18 ASSESSMENT — PAIN SCALES - GENERAL: PAINLEVEL: MODERATE PAIN (4)

## 2017-12-18 NOTE — NURSING NOTE
"Chief Complaint   Patient presents with     Pain     upper abdomen pain HX of  Open Ventral Hernia Repair with parietex mesh  DOS 3-4-2015       Initial Pulse 68  Temp 97.2  F (36.2  C) (Temporal)  Wt 208 lb (94.3 kg)  BMI 29.84 kg/m2 Estimated body mass index is 29.84 kg/(m^2) as calculated from the following:    Height as of 8/31/17: 5' 10\" (1.778 m).    Weight as of this encounter: 208 lb (94.3 kg).  Medication Reconciliation: complete    "

## 2017-12-18 NOTE — NURSING NOTE
Diastasis Recti information/excerise sheet mailed to patient...........................................Halima Sherman CMA  ([facility])

## 2017-12-18 NOTE — TELEPHONE ENCOUNTER
Ventolin:  Prescription approved per [facility] Refill Protocol.    Faith Lino, RN, BSN

## 2017-12-18 NOTE — PROGRESS NOTES
F/U for hernia repair      Subjective:   patient is a 42-year-old white male who had an open ventral hernia repair done in the right upper quadrant in 2015. He's been doing some work on his house for the past 6 months and pulling postoperative the ground and noticed upper abdominal sensation similar to what he had before he had the hernia repair. Sensation only occurs with lifting or pulling.  He is concerned he might have a new hernia. He denies any nausea vomiting fevers chills, associated with meals or observable masses. He states his original pair appears to be intact and this is more towards the midline. He now presents for evaluation for possible new ventral hernia.    Objective:  B/P: Data Unavailable, T: 97.2, P: 68, R: Data Unavailable  Abd: soft, non tender, non distended, no hernias. Diastasis recti.  Original hernia repair intact.    Assessment/plan:  This is a 42-year-old gentleman presenting with some upper abdominal discomfort with lifting. He does not have a hernia on exam at this time but does have some diastases recti. I discussed this with the patient and he expressed understanding. The plant this time is just for observation and should a hernia develop he knows what to look for and to follow up with me. He will follow-up with me as necessary.    Giovanni Greenfield MD, FACS

## 2017-12-18 NOTE — MR AVS SNAPSHOT
After Visit Summary   12/18/2017    Raffaele Guzman    MRN: [id number]           Patient Information     Date Of Birth          1975        Visit Information        Provider Department      12/18/2017 11:00 AM Giovanni Greenfield MD [facility]         Follow-ups after your visit        Your next 10 appointments already scheduled     Dec 18, 2017 11:00 AM CST   Return Visit with Giovanni Greenfield MD   [facility]    [address] [address] [phone]              Who to contact     If you have questions or need follow up information about today's clinic visit or your schedule please contact [facility] directly at [phone].  Normal or non-critical lab and imaging results will be communicated to you by MyChart, letter or phone within 4 business days after the clinic has received the results. If you do not hear from us within 7 days, please contact the clinic through MyChart or phone. If you have a critical or abnormal lab result, we will notify you by phone as soon as possible.  Submit refill requests through Friendly Score or call your pharmacy and they will forward the refill request to us. Please allow 3 business days for your refill to be completed.          Additional Information About Your Visit        MyChart Information     Friendly Score gives you secure access to your electronic health record. If you see a primary care provider, you can also send messages to your care team and make appointments. If you have questions, please call your primary care clinic.  If you do not have a primary care provider, please call [phone] and they will assist you.        Care EveryWhere ID     This is your Care EveryWhere ID. This could be used by other organizations to access your [place] medical records  [id number]        Your Vitals Were     Pulse Temperature BMI (Body Mass Index)             68 97.2  F (36.2  C)  (Temporal) 29.84 kg/m2          Blood Pressure from Last 3 Encounters:   09/07/17 147/83   08/31/17 120/80   12/26/16 114/80    Weight from Last 3 Encounters:   12/18/17 208 lb (94.3 kg)   09/07/17 212 lb (96.2 kg)   08/31/17 213 lb (96.6 kg)              Today, you had the following     No orders found for display       Primary Care Provider Office Phone # Fax #    Uche Crespo PA-C [phone]       [address]  [facility] [address]        Equal Access to Services     [facility]: Hadii aad ku hadasho Somakeda, waaxda luqadaha, qaybta kaalmada adesandiyatimmy, eliel vidal . So [facility] [phone].    ATENCIÓN: Si habla español, tiene a beavers disposición servicios gratuitos de asistencia lingüística. [facility] [phone].    We comply with applicable federal civil rights laws and [place] laws. We do not discriminate on the basis of race, color, national origin, age, disability, sex, sexual orientation, or gender identity.            Thank you!     Thank you for choosing [facility]  for your care. Our goal is always to provide you with excellent care. Hearing back from our patients is one way we can continue to improve our services. Please take a few minutes to complete the written survey that you may receive in the mail after your visit with us. Thank you!             Your Updated Medication List - Protect others around you: Learn how to safely use, store and throw away your medicines at www.disposemymeds.org.          This list is accurate as of: 12/18/17 10:52 AM.  Always use your most recent med list.                   Brand Name Dispense Instructions for use Diagnosis    naproxen 500 MG tablet    NAPROSYN    30 tablet    Take 1 tablet (500 mg) by mouth 2 times daily (with meals)    Rotator cuff strain, left, initial encounter       ofloxacin 0.3 % ophthalmic solution    OCUFLOX    1 Bottle    5 drops in left ear once daily    Foreign body in  ear, left, initial encounter       PROAIR  (90 BASE) MCG/ACT Inhaler   Generic drug:  albuterol     8.5 g    INHALE 2 PUFFS INTO THE LUNGS EVERY 6 HOURS AS NEEDED FOR SHORTNESS OF BREATH, DIFFICULT BREATHING, OR WHEEZING    Moderate persistent asthma without complication

## 2017-12-18 NOTE — LETTER
12/18/2017         RE: Raffaele Guzman  [address] [address] [facility] [address]        Dear Colleague,    Thank you for referring your patient, Raffaele Guzman, to the [facility]. Please see a copy of my visit note below.    F/U for hernia repair      Subjective:   patient is a 42-year-old white male who had an open ventral hernia repair done in the right upper quadrant in 2015. He's been doing some work on his house for the past 6 months and pulling postoperative the ground and noticed upper abdominal sensation similar to what he had before he had the hernia repair. Sensation only occurs with lifting or pulling.  He is concerned he might have a new hernia. He denies any nausea vomiting fevers chills, associated with meals or observable masses. He states his original pair appears to be intact and this is more towards the midline. He now presents for evaluation for possible new ventral hernia.    Objective:  B/P: Data Unavailable, T: 97.2, P: 68, R: Data Unavailable  Abd: soft, non tender, non distended, no hernias. Diastasis recti.  Original hernia repair intact.    Assessment/plan:  This is a 42-year-old gentleman presenting with some upper abdominal discomfort with lifting. He does not have a hernia on exam at this time but does have some diastases recti. I discussed this with the patient and he expressed understanding. The plant this time is just for observation and should a hernia develop he knows what to look for and to follow up with me. He will follow-up with me as necessary.    Giovanni Greenfield MD, FACS    Again, thank you for allowing me to participate in the care of your patient.        Sincerely,        Giovanni Greenfield MD

## 2017-12-31 NOTE — MR AVS SNAPSHOT
"              After Visit Summary   5/8/2017    Raffaele Guzman    MRN: [id number]           Patient Information     Date Of Birth          1975        Visit Information        Provider Department      5/8/2017 3:30 PM Niall Arredondo MD [facility]        Today's Diagnoses     Foreign body in ear, left, initial encounter    -  1       Follow-ups after your visit        Your next 10 appointments already scheduled     May 22, 2017 10:15 AM CDT   Return Visit with Niall [facility]redondo MD   [facility]    [address] [place] [id number] [phone]              Who to contact     If you have questions or need follow up information about today's clinic visit or your schedule please contact [facility] directly at [phone].  Normal or non-critical lab and imaging results will be communicated to you by Customer.iohart, letter or phone within 4 business days after the clinic has received the results. If you do not hear from us within 7 days, please contact the clinic through Customer.iohart or phone. If you have a critical or abnormal lab result, we will notify you by phone as soon as possible.  Submit refill requests through Rumgr or call your pharmacy and they will forward the refill request to us. Please allow 3 business days for your refill to be completed.          Additional Information About Your Visit        MyChart Information     Rumgr lets you send messages to your doctor, view your test results, renew your prescriptions, schedule appointments and more. To sign up, go to [email or web address] . Click on \"Log in\" on the left side of the screen, which will take you to the Welcome page. Then click on \"Sign up Now\" on the right side of the page.     You will be asked to enter the access code listed below, as well as some personal information. Please follow the directions to create your username and password.     Your access code " Writer spoke to Dr. Je Martinez regarding pt and PD orders. Orders to hold PD for tonight. is: [email or web address]  Expires: 2017  4:10 PM     Your access code will  in 90 days. If you need help or a new code, please call your [facility] clinic or [phone].        Care EveryWhere ID     This is your Care EveryWhere ID. This could be used by other organizations to access your [facility] medical records  [id number]        Your Vitals Were     Pulse Pulse Oximetry BMI (Body Mass Index)             95 95% 31.34 kg/m2          Blood Pressure from Last 3 Encounters:   16 114/80   16 120/84   11/30/15 110/80    Weight from Last 3 Encounters:   17 99.8 kg (220 lb)   16 98 kg (216 lb)   16 94.8 kg (209 lb)              Today, you had the following     No orders found for display       Primary Care Provider    None       No address on file        Thank you!     Thank you for choosing [facility]  for your care. Our goal is always to provide you with excellent care. Hearing back from our patients is one way we can continue to improve our services. Please take a few minutes to complete the written survey that you may receive in the mail after your visit with us. Thank you!             Your Updated Medication List - Protect others around you: Learn how to safely use, store and throw away your medicines at www.disposemymeds.org.          This list is accurate as of: 17  4:10 PM.  Always use your most recent med list.                   Brand Name Dispense Instructions for use    albuterol 108 (90 BASE) MCG/ACT Inhaler    VENTOLIN HFA    1 Inhaler    Inhale 2 puffs into the lungs every 6 hours as needed for shortness of breath / dyspnea or wheezing       HYDROcodone-acetaminophen 5-325 MG per tablet    NORCO    30 tablet    Take 1-2 tablets by mouth every 4 hours as needed for other (Moderate to Severe Pain)       naproxen 500 MG tablet    NAPROSYN    30 tablet    Take 1 tablet (500 mg) by mouth 2 times daily (with meals)       oxyCODONE 5 MG IR tablet    ROXICODONE     20 tablet    Take 1 tablet (5 mg) by mouth every 4 hours as needed for moderate to severe pain

## 2018-01-09 DIAGNOSIS — J45.40 MODERATE PERSISTENT ASTHMA WITHOUT COMPLICATION: ICD-10-CM

## 2018-01-10 RX ORDER — ALBUTEROL SULFATE 90 UG/1
AEROSOL, METERED RESPIRATORY (INHALATION)
Qty: 8.5 G | Refills: 1 | Status: SHIPPED | OUTPATIENT
Start: 2018-01-10 | End: 2018-03-21

## 2018-01-10 NOTE — TELEPHONE ENCOUNTER
"Requested Prescriptions   Pending Prescriptions Disp Refills     albuterol (PROAIR HFA) 108 (90 BASE) MCG/ACT Inhaler 8.5 g 3    Asthma Maintenance Inhalers - Anticholinergics Passed    1/9/2018  7:49 AM       Passed - Patient is age 12 years or older       Passed - Asthma control test score is 20 or greater in last 6 months    Please review ACT score.   ACT Total Scores 9/7/2017   ACT TOTAL SCORE -   ASTHMA ER VISITS -   ASTHMA HOSPITALIZATIONS -   ACT TOTAL SCORE (Goal Greater than or Equal to 20) 21   In the past 12 months, how many times did you visit the emergency room for your asthma without being admitted to the hospital? 0   In the past 12 months, how many times were you hospitalized overnight because of your asthma? 0          Passed - Recent (6 mo) or future visit with authorizing provider's specialty    Patient had office visit in the last 6 months or has a visit in the next 30 days with authorizing provider.  See \"Patient Info\" tab in inbasket, or \"Choose Columns\" in Meds & Orders section of the refill encounter.           VENTOLIN  (90 BASE) MCG/ACT Inhaler  Prescription approved per [facility] Refill Protocol.    Per OV note 08/31/2017 to follow up for asthma in 6 months (03/2018).    Please assist with scheduling.    Leia Arguelles, RN, BSN       "

## 2018-03-21 ENCOUNTER — OFFICE VISIT (OUTPATIENT)
Dept: FAMILY MEDICINE | Facility: OTHER | Age: 43
End: 2018-03-21
Payer: COMMERCIAL

## 2018-03-21 VITALS
HEART RATE: 108 BPM | WEIGHT: 206.2 LBS | DIASTOLIC BLOOD PRESSURE: 70 MMHG | OXYGEN SATURATION: 96 % | RESPIRATION RATE: 18 BRPM | BODY MASS INDEX: 29.52 KG/M2 | SYSTOLIC BLOOD PRESSURE: 118 MMHG | TEMPERATURE: 99.8 F | HEIGHT: 70 IN

## 2018-03-21 DIAGNOSIS — J45.40 MODERATE PERSISTENT ASTHMA WITHOUT COMPLICATION: ICD-10-CM

## 2018-03-21 DIAGNOSIS — R50.9 FEVER, UNSPECIFIED FEVER CAUSE: ICD-10-CM

## 2018-03-21 DIAGNOSIS — J10.1 INFLUENZA A: Primary | ICD-10-CM

## 2018-03-21 LAB
FLUAV+FLUBV AG SPEC QL: NEGATIVE
FLUAV+FLUBV AG SPEC QL: POSITIVE
SPECIMEN SOURCE: ABNORMAL

## 2018-03-21 PROCEDURE — 99213 OFFICE O/P EST LOW 20 MIN: CPT | Performed by: NURSE PRACTITIONER

## 2018-03-21 PROCEDURE — 87804 INFLUENZA ASSAY W/OPTIC: CPT | Mod: 59 | Performed by: NURSE PRACTITIONER

## 2018-03-21 RX ORDER — OSELTAMIVIR PHOSPHATE 75 MG/1
75 CAPSULE ORAL 2 TIMES DAILY
Qty: 10 CAPSULE | Refills: 0 | Status: SHIPPED | OUTPATIENT
Start: 2018-03-21 | End: 2018-10-31

## 2018-03-21 RX ORDER — ALBUTEROL SULFATE 90 UG/1
AEROSOL, METERED RESPIRATORY (INHALATION)
Qty: 8.5 G | Refills: 1 | Status: SHIPPED | OUTPATIENT
Start: 2018-03-21 | End: 2018-09-13

## 2018-03-21 NOTE — MR AVS SNAPSHOT
After Visit Summary   3/21/2018    Raffaele Guzman    MRN: [id number]           Patient Information     Date Of Birth          1975        Visit Information        Provider Department      3/21/2018 10:40 AM Kari Lubin APRN [facility]        Today's Diagnoses     Fever, unspecified fever cause    -  1    Moderate persistent asthma without complication        Influenza A          Care Instructions          Influenza (Adult)    Influenza is also called the flu. It is a viral illness that affects the air passages of your lungs. It is different from the common cold. The flu can easily be passed from one to person to another. It may be spread through the air by coughing and sneezing. Or it can be spread by touching the sick person and then touching your own eyes, nose, or mouth.  The flu starts 1 to 3 days after you are exposed to the flu virus. It may last for 1 to 2 weeks but many people feel tired or fatigued for many weeks afterward. You usually don t need to take antibiotics unless you have a complication. This might be an ear or sinus infection or pneumonia.  Symptoms of the flu may be mild or severe. They can include extreme tiredness (wanting to stay in bed all day), chills, fevers, muscle aches, soreness with eye movement, headache, and a dry, hacking cough.  Home care  Follow these guidelines when caring for yourself at home:    Avoid being around cigarette smoke, whether yours or other people s.    Acetaminophen or ibuprofen will help ease your fever, muscle aches, and headache. Don t give aspirin to anyone younger than 18 who has the flu. Aspirin can harm the liver.    Nausea and loss of appetite are common with the flu. Eat light meals. Drink 6 to 8 glasses of liquids every day. Good choices are water, sport drinks, soft drinks without caffeine, juices, tea, and soup. Extra fluids will also help loosen secretions in your nose and  lungs.    Over-the-counter cold medicines will not make the flu go away faster. But the medicines may help with coughing, sore throat, and congestion in your nose and sinuses. Don t use a decongestant if you have high blood pressure.    Stay home until your fever has been gone for at least 24 hours without using medicine to reduce fever.  Follow-up care  Follow up with your healthcare provider, or as advised, if you are not getting better over the next week.  If you are age 65 or older, talk with your provider about getting a pneumococcal vaccine every 5 years. You should also get this vaccine if you have chronic asthma or COPD. All adults should get a flu vaccine every fall. Ask your provider about this.  When to seek medical advice  Call your healthcare provider right away if any of these occur:    Cough with lots of colored mucus (sputum) or blood in your mucus    Chest pain, shortness of breath, wheezing, or trouble breathing    Severe headache, or face, neck, or ear pain    New rash with fever    Fever of 100.4 F (38 C) or higher, or as directed by your healthcare provider    Confusion, behavior change, or seizure    Severe weakness or dizziness    You get a new fever or cough after getting better for a few days  Date Last Reviewed: 1/1/2017 2000-2017 [employer]. [address] [place] [address], [place] [address]. All rights reserved. This information is not intended as a substitute for professional medical care. Always follow your healthcare professional's instructions.    Please return to clinic if symptoms do not improve or improve in 7-10 days.     Thank you  Kari Lubin CNP                  Follow-ups after your visit        Who to contact     If you have questions or need follow up information about today's clinic visit or your schedule please contact [facility] directly at [phone].  Normal or non-critical lab and imaging results will be communicated to you by Amanda  "letter or phone within 4 business days after the clinic has received the results. If you do not hear from us within 7 days, please contact the clinic through Tintri or phone. If you have a critical or abnormal lab result, we will notify you by phone as soon as possible.  Submit refill requests through Tintri or call your pharmacy and they will forward the refill request to us. Please allow 3 business days for your refill to be completed.          Additional Information About Your Visit        LiquidHubharSiteminis Information     Tintri gives you secure access to your electronic health record. If you see a primary care provider, you can also send messages to your care team and make appointments. If you have questions, please call your primary care clinic.  If you do not have a primary care provider, please call [phone] and they will assist you.        Care EveryWhere ID     This is your Care EveryWhere ID. This could be used by other organizations to access your [place] medical records  [id number]        Your Vitals Were     Pulse Temperature Respirations Height Pulse Oximetry BMI (Body Mass Index)    108 99.8  F (37.7  C) (Oral) 18 5' 10\" (1.778 m) 96% 29.59 kg/m2       Blood Pressure from Last 3 Encounters:   03/21/18 118/70   09/07/17 147/83   08/31/17 120/80    Weight from Last 3 Encounters:   03/21/18 206 lb 3.2 oz (93.5 kg)   12/18/17 208 lb (94.3 kg)   09/07/17 212 lb (96.2 kg)              We Performed the Following     Influenza A/B antigen          Today's Medication Changes          These changes are accurate as of 3/21/18 11:36 AM.  If you have any questions, ask your nurse or doctor.               Start taking these medicines.        Dose/Directions    oseltamivir 75 MG capsule   Commonly known as:  TAMIFLU   Used for:  Influenza A   Started by:  Kari Lubin APRN CNP        Dose:  75 mg   Take 1 capsule (75 mg) by mouth 2 times daily   Quantity:  10 capsule   Refills:  0            Where to " get your medicines      These medications were sent to [id number] - [place], [place] - [address] [address] [facility] [address]  [address] [facility] [facility], [facility] [address]     Phone:  [phone]     albuterol 108 (90 BASE) MCG/ACT Inhaler    oseltamivir 75 MG capsule                Primary Care Provider Office Phone # Fax #    Uche Crespo PA-C [phone]       [address] [place] [address] [address]  [facility] [address]        Equal Access to Services     [facility] : Hadii aad ku hadasho Soomaali, waaxda luqadaha, qaybta kaalmada adeegyada, waxrashid vidal . So [facility] [phone].    ATENCIÓN: Si habla español, tiene a beavers disposición servicios gratuitos de asistencia lingüística. [facility] [phone].    We comply with applicable federal civil rights laws and [place] laws. We do not discriminate on the basis of race, color, national origin, age, disability, sex, sexual orientation, or gender identity.            Thank you!     Thank you for choosing [facility]  for your care. Our goal is always to provide you with excellent care. Hearing back from our patients is one way we can continue to improve our services. Please take a few minutes to complete the written survey that you may receive in the mail after your visit with us. Thank you!             Your Updated Medication List - Protect others around you: Learn how to safely use, store and throw away your medicines at www.disposemymeds.org.          This list is accurate as of 3/21/18 11:36 AM.  Always use your most recent med list.                   Brand Name Dispense Instructions for use Diagnosis    oseltamivir 75 MG capsule    TAMIFLU    10 capsule    Take 1 capsule (75 mg) by mouth 2 times daily    Influenza A       * VENTOLIN  (90 BASE) MCG/ACT Inhaler   Generic drug:  albuterol     18 g    INHALE 2 PUFFS INTO THE LUNGS EVERY 6 HOURS AS NEEDED FOR SHORTNESS OF BREATH/DYSPNEA OR  WHEEZING    Moderate persistent asthma without complication       * albuterol 108 (90 BASE) MCG/ACT Inhaler    PROAIR HFA    8.5 g    INHALE 2 PUFFS INTO THE LUNGS EVERY 6 HOURS AS NEEDED FOR SHORTNESS OF BREATH, DIFFICULT BREATHING, OR WHEEZING    Moderate persistent asthma without complication       * Notice:  This list has 2 medication(s) that are the same as other medications prescribed for you. Read the directions carefully, and ask your doctor or other care provider to review them with you.

## 2018-03-21 NOTE — PROGRESS NOTES
SUBJECTIVE:   Raffaele Guzman is a 42 year old male who presents to clinic today for the following health issues:    Pt has a cough with occasional wheezing. I did not collect an ACT today.     HPI     Acute Illness   Acute illness concerns: cough  Onset: Monday     Fever: YES    Chills/Sweats: YES    Headache (location?): YES    Sinus Pressure:YES    Conjunctivitis:  no    Ear Pain: no    Rhinorrhea: no     Congestion: YES, little bit     Sore Throat: no      Cough: YES-productive slightly     Wheeze: YES    Decreased Appetite: YES    Nausea: no    Vomiting: no    Diarrhea:  YES but hasn't eaten much in 3 days     Dysuria/Freq.: no    Fatigue/Achiness: YES    Sick/Strep Exposure: no      Therapies Tried and outcome: Tylenol     Started Monday with dry cough, fatigue and achy muscles. Mild headache. Started having diarrhea this morning this is the only time. Drinking fluids. States unable to drink water over Mon and Tues has increase today. Voiding adequate amounts.    Last tylenol was this morning at 8 am. Fever was as high as 101. 7       Problem list and histories reviewed & adjusted, as indicated.  Additional history: as documented    Patient Active Problem List   Diagnosis     Allergy to mold spores     Seasonal allergic conjunctivitis     Seasonal allergic rhinitis     House dust mite allergy     Diagnostic skin and sensitization tests(aka ALLERGENS)     Hypovitaminosis D     Moderate persistent asthma     Nasal polyp     Anxiety     Concussion without loss of consciousness     Mild intermittent asthma     Pain in joint, multiple sites     Past Surgical History:   Procedure Laterality Date     HERNIORRHAPHY VENTRAL N/A 3/4/2015    Procedure: HERNIORRHAPHY VENTRAL;  Surgeon: Giovanni Greenfield MD;  Location:  OR       Social History   Substance Use Topics     Smoking status: Current Some Day Smoker     Last attempt to quit: 4/16/2003     Smokeless tobacco: Never Used     Alcohol use Yes      Comment: occ.  "use     History reviewed. No pertinent family history.      Current Outpatient Prescriptions   Medication Sig Dispense Refill     albuterol (PROAIR HFA) 108 (90 BASE) MCG/ACT Inhaler INHALE 2 PUFFS INTO THE LUNGS EVERY 6 HOURS AS NEEDED FOR SHORTNESS OF BREATH, DIFFICULT BREATHING, OR WHEEZING 8.5 g 1     VENTOLIN  (90 BASE) MCG/ACT Inhaler INHALE 2 PUFFS INTO THE LUNGS EVERY 6 HOURS AS NEEDED FOR SHORTNESS OF BREATH/DYSPNEA OR WHEEZING 18 g 1     [DISCONTINUED] albuterol (PROAIR HFA) 108 (90 BASE) MCG/ACT Inhaler INHALE 2 PUFFS INTO THE LUNGS EVERY 6 HOURS AS NEEDED FOR SHORTNESS OF BREATH, DIFFICULT BREATHING, OR WHEEZING 8.5 g 1     Allergies   Allergen Reactions     Aspirin Unknown     As a kid     BP Readings from Last 3 Encounters:   03/21/18 118/70   09/07/17 147/83   08/31/17 120/80    Wt Readings from Last 3 Encounters:   03/21/18 206 lb 3.2 oz (93.5 kg)   12/18/17 208 lb (94.3 kg)   09/07/17 212 lb (96.2 kg)                  Labs reviewed in EPIC    ROS:  Constitutional, HEENT, cardiovascular, pulmonary, gi and gu systems are negative, except as otherwise noted.    OBJECTIVE:     /70 (BP Location: Right arm, Patient Position: Chair, Cuff Size: Adult Large)  Pulse 108  Temp 99.8  F (37.7  C) (Oral)  Resp 18  Ht 5' 10\" (1.778 m)  Wt 206 lb 3.2 oz (93.5 kg)  SpO2 96%  BMI 29.59 kg/m2  Body mass index is 29.59 kg/(m^2).  GENERAL: alert, mild distress, fatigued and flushed  EYES: Eyes grossly normal to inspection and conjunctiva/corneas- conjunctival injection OU  HENT: ear canals and TM's normal, nose and mouth without ulcers or lesions  NECK: bilateral anterior cervical adenopathy, no asymmetry, masses, or scars and thyroid normal to palpation  RESP: lungs clear to auscultation - no rales, rhonchi or wheezes  CV: regular rate and rhythm, normal S1 S2, no S3 or S4, no murmur, click or rub, no peripheral edema and peripheral pulses strong        ASSESSMENT/PLAN:     1. Influenza A  Home " instructions reviewed. Side effects of medications, proper use, the associated risk/benefits and other options were discussed. Patient understands these risks and agrees to take the medication as instructed.     - oseltamivir (TAMIFLU) 75 MG capsule; Take 1 capsule (75 mg) by mouth 2 times daily  Dispense: 10 capsule; Refill: 0    2. Fever, unspecified fever cause    - Influenza A/B antigen    3. Moderate persistent asthma without complication  Refill given denies s/e  - albuterol (PROAIR HFA) 108 (90 BASE) MCG/ACT Inhaler; INHALE 2 PUFFS INTO THE LUNGS EVERY 6 HOURS AS NEEDED FOR SHORTNESS OF BREATH, DIFFICULT BREATHING, OR WHEEZING  Dispense: 8.5 g; Refill: 1    Patient Instructions         Influenza (Adult)    Influenza is also called the flu. It is a viral illness that affects the air passages of your lungs. It is different from the common cold. The flu can easily be passed from one to person to another. It may be spread through the air by coughing and sneezing. Or it can be spread by touching the sick person and then touching your own eyes, nose, or mouth.  The flu starts 1 to 3 days after you are exposed to the flu virus. It may last for 1 to 2 weeks but many people feel tired or fatigued for many weeks afterward. You usually don t need to take antibiotics unless you have a complication. This might be an ear or sinus infection or pneumonia.  Symptoms of the flu may be mild or severe. They can include extreme tiredness (wanting to stay in bed all day), chills, fevers, muscle aches, soreness with eye movement, headache, and a dry, hacking cough.  Home care  Follow these guidelines when caring for yourself at home:    Avoid being around cigarette smoke, whether yours or other people s.    Acetaminophen or ibuprofen will help ease your fever, muscle aches, and headache. Don t give aspirin to anyone younger than 18 who has the flu. Aspirin can harm the liver.    Nausea and loss of appetite are common with the flu.  Eat light meals. Drink 6 to 8 glasses of liquids every day. Good choices are water, sport drinks, soft drinks without caffeine, juices, tea, and soup. Extra fluids will also help loosen secretions in your nose and lungs.    Over-the-counter cold medicines will not make the flu go away faster. But the medicines may help with coughing, sore throat, and congestion in your nose and sinuses. Don t use a decongestant if you have high blood pressure.    Stay home until your fever has been gone for at least 24 hours without using medicine to reduce fever.  Follow-up care  Follow up with your healthcare provider, or as advised, if you are not getting better over the next week.  If you are age 65 or older, talk with your provider about getting a pneumococcal vaccine every 5 years. You should also get this vaccine if you have chronic asthma or COPD. All adults should get a flu vaccine every fall. Ask your provider about this.  When to seek medical advice  Call your healthcare provider right away if any of these occur:    Cough with lots of colored mucus (sputum) or blood in your mucus    Chest pain, shortness of breath, wheezing, or trouble breathing    Severe headache, or face, neck, or ear pain    New rash with fever    Fever of 100.4 F (38 C) or higher, or as directed by your healthcare provider    Confusion, behavior change, or seizure    Severe weakness or dizziness    You get a new fever or cough after getting better for a few days  Date Last Reviewed: 1/1/2017 2000-2017 [employer]. [address] [place] [address]. All rights reserved. This information is not intended as a substitute for professional medical care. Always follow your healthcare professional's instructions.    Please return to clinic if symptoms do not improve or improve in 7-10 days.     Thank you  Kari Lubin, BEATRIZ [facility]

## 2018-03-21 NOTE — PATIENT INSTRUCTIONS
Influenza (Adult)    Influenza is also called the flu. It is a viral illness that affects the air passages of your lungs. It is different from the common cold. The flu can easily be passed from one to person to another. It may be spread through the air by coughing and sneezing. Or it can be spread by touching the sick person and then touching your own eyes, nose, or mouth.  The flu starts 1 to 3 days after you are exposed to the flu virus. It may last for 1 to 2 weeks but many people feel tired or fatigued for many weeks afterward. You usually don t need to take antibiotics unless you have a complication. This might be an ear or sinus infection or pneumonia.  Symptoms of the flu may be mild or severe. They can include extreme tiredness (wanting to stay in bed all day), chills, fevers, muscle aches, soreness with eye movement, headache, and a dry, hacking cough.  Home care  Follow these guidelines when caring for yourself at home:    Avoid being around cigarette smoke, whether yours or other people s.    Acetaminophen or ibuprofen will help ease your fever, muscle aches, and headache. Don t give aspirin to anyone younger than 18 who has the flu. Aspirin can harm the liver.    Nausea and loss of appetite are common with the flu. Eat light meals. Drink 6 to 8 glasses of liquids every day. Good choices are water, sport drinks, soft drinks without caffeine, juices, tea, and soup. Extra fluids will also help loosen secretions in your nose and lungs.    Over-the-counter cold medicines will not make the flu go away faster. But the medicines may help with coughing, sore throat, and congestion in your nose and sinuses. Don t use a decongestant if you have high blood pressure.    Stay home until your fever has been gone for at least 24 hours without using medicine to reduce fever.  Follow-up care  Follow up with your healthcare provider, or as advised, if you are not getting better over the next week.  If you are age 65  or older, talk with your provider about getting a pneumococcal vaccine every 5 years. You should also get this vaccine if you have chronic asthma or COPD. All adults should get a flu vaccine every fall. Ask your provider about this.  When to seek medical advice  Call your healthcare provider right away if any of these occur:    Cough with lots of colored mucus (sputum) or blood in your mucus    Chest pain, shortness of breath, wheezing, or trouble breathing    Severe headache, or face, neck, or ear pain    New rash with fever    Fever of 100.4 F (38 C) or higher, or as directed by your healthcare provider    Confusion, behavior change, or seizure    Severe weakness or dizziness    You get a new fever or cough after getting better for a few days  Date Last Reviewed: 1/1/2017 2000-2017 [employer]. [address] [place] [address], [place] [address]. All rights reserved. This information is not intended as a substitute for professional medical care. Always follow your healthcare professional's instructions.    Please return to clinic if symptoms do not improve or improve in 7-10 days.     Thank you  Kari Lubin CNP

## 2018-03-21 NOTE — NURSING NOTE
"No chief complaint on file.      Initial /70 (BP Location: Right arm, Patient Position: Chair, Cuff Size: Adult Large)  Pulse 108  Temp 99.8  F (37.7  C) (Oral)  Resp 18  Ht 5' 10\" (1.778 m)  Wt 206 lb 3.2 oz (93.5 kg)  SpO2 96%  BMI 29.59 kg/m2 Estimated body mass index is 29.59 kg/(m^2) as calculated from the following:    Height as of this encounter: 5' 10\" (1.778 m).    Weight as of this encounter: 206 lb 3.2 oz (93.5 kg).  Medication Reconciliation: complete    "

## 2018-09-13 DIAGNOSIS — J45.40 MODERATE PERSISTENT ASTHMA WITHOUT COMPLICATION: ICD-10-CM

## 2018-09-13 RX ORDER — ALBUTEROL SULFATE 90 UG/1
AEROSOL, METERED RESPIRATORY (INHALATION)
Qty: 8.5 G | Refills: 0 | Status: SHIPPED | OUTPATIENT
Start: 2018-09-13 | End: 2019-01-15

## 2018-09-13 NOTE — TELEPHONE ENCOUNTER
LM for patient to return phone call to clinic about message below.  Alejandra Garner CMA ([facility])

## 2018-09-13 NOTE — TELEPHONE ENCOUNTER
Albuterol    ACT Total Scores 9/7/2017   ACT TOTAL SCORE -   ASTHMA ER VISITS -   ASTHMA HOSPITALIZATIONS -   ACT TOTAL SCORE (Goal Greater than or Equal to 20) 21   In the past 12 months, how many times did you visit the emergency room for your asthma without being admitted to the hospital? 0   In the past 12 months, how many times were you hospitalized overnight because of your asthma? 0     Routing refill request to provider for review/approval because:  Labs not current:  ACT    Gillian Tejada RN, BSN

## 2018-09-13 NOTE — TELEPHONE ENCOUNTER
Informed pt of message below. He stated that he does not want to waste money coming into the clinic when nothing is wrong. I informed him that he does need an appointment for any further prescriptions.

## 2018-10-25 ENCOUNTER — TELEPHONE (OUTPATIENT)
Dept: FAMILY MEDICINE | Facility: OTHER | Age: 43
End: 2018-10-25

## 2018-10-25 NOTE — TELEPHONE ENCOUNTER
Summary:    Patient is due/failing the following:   ACT    Action needed:   Patient needs to do ACT.    Type of outreach:    Sent letter.    Questions for provider review:    None                                                                                                                                    Matilda Cortney       Chart routed to Care Team .        Panel Management Review      Patient has the following on his problem list:     Asthma review     ACT Total Scores 9/7/2017   ACT TOTAL SCORE -   ASTHMA ER VISITS -   ASTHMA HOSPITALIZATIONS -   ACT TOTAL SCORE (Goal Greater than or Equal to 20) 21   In the past 12 months, how many times did you visit the emergency room for your asthma without being admitted to the hospital? 0   In the past 12 months, how many times were you hospitalized overnight because of your asthma? 0      1. Is Asthma diagnosis on the Problem List? Yes    2. Is Asthma listed on Health Maintenance? Yes    3. Patient is due for:  ACT      Composite cancer screening  Chart review shows that this patient is due/due soon for the following None

## 2018-10-25 NOTE — LETTER
[address] [address]  Phone: [phone]  October 25, 2018      Raffaele Guzman  [address] [facility] [address]      Dear Raffaele,    We care about your health and have reviewed your health plan including your medical conditions, medications, and lab results.  Based on this review, it is recommended that you follow up regarding the following health topic(s):  -Asthma    We recommend you take the following action(s):   -Complete and return the attached ASTHMA CONTROL TEST.  If your total score is 19 or less or you have been to the ER or urgent care for your asthma, then please schedule an asthma followup appointment.     Please call us at the [facility] - [phone] (or use [employer]) to address the above recommendations.     Thank you for trusting [facility] and we appreciate the opportunity to serve you.  We look forward to supporting your healthcare needs in the future.    Healthy Regards,    Your Health Care Team  [facility]

## 2018-10-31 ENCOUNTER — OFFICE VISIT (OUTPATIENT)
Dept: FAMILY MEDICINE | Facility: OTHER | Age: 43
End: 2018-10-31
Payer: COMMERCIAL

## 2018-10-31 ENCOUNTER — TELEPHONE (OUTPATIENT)
Dept: FAMILY MEDICINE | Facility: OTHER | Age: 43
End: 2018-10-31

## 2018-10-31 VITALS
SYSTOLIC BLOOD PRESSURE: 122 MMHG | RESPIRATION RATE: 18 BRPM | OXYGEN SATURATION: 96 % | DIASTOLIC BLOOD PRESSURE: 82 MMHG | HEART RATE: 84 BPM | TEMPERATURE: 98.4 F

## 2018-10-31 DIAGNOSIS — L03.114 CELLULITIS OF LEFT UPPER EXTREMITY: ICD-10-CM

## 2018-10-31 DIAGNOSIS — J45.20 MILD INTERMITTENT ASTHMA WITHOUT COMPLICATION: ICD-10-CM
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
## 2018-10-31 PROCEDURE — 99213 OFFICE O/P EST LOW 20 MIN: CPT | Performed by: PHYSICIAN ASSISTANT

## 2018-10-31 RX ORDER — MULTIPLE VITAMINS W/ MINERALS TAB 9MG-400MCG
1 TAB ORAL DAILY
COMMUNITY

## 2018-10-31 RX ORDER — CIDER VINEGAR 300 MG
TABLET ORAL
COMMUNITY
End: 2021-12-27

## 2018-10-31 RX ORDER — DOXYCYCLINE 100 MG/1
100 CAPSULE ORAL 2 TIMES DAILY
Qty: 20 CAPSULE | Refills: 0 | Status: SHIPPED | OUTPATIENT
Start: 2018-10-31 | End: 2019-06-17

## 2018-10-31 ASSESSMENT — PAIN SCALES - GENERAL: PAINLEVEL: MILD PAIN (2)

## 2018-10-31 NOTE — PATIENT INSTRUCTIONS
Symptoms are consistent with cellulitis which is a local infection.  Will prescribe a 10 day course of doxycyline which also treats Lyme disease.  Wash the area with soapy water 1-2 times daily.   If the redness is spreading or you have worsening pain or fevers, you should be seen right away.    Continue with albuterol inhaler as needed.     Follow up annually.

## 2018-10-31 NOTE — TELEPHONE ENCOUNTER
Raffaele Guzman is a 43 year old male who presents with concerns after a tick bite left arm.    NURSING ASSESSMENT:  Description:  Embedded tick bite removed this afternoon (10/31/2018) but has been there for a few days, above the elbow back of the left arm.  Occasional pain and discomfort, on the left arm. Increased warmth around the embedded site, redness measuring 10cm width and 8cm length. Occasional drainage from embedded site. Denies body aches, chills, sweats, fevers.   Onset/duration:  A few days   Precip. factors:  Tick bite   Associated symptoms: let arm redness, occasional pain   Improves/worsens symptoms:  NEW   Pain scale (0-10)   Moderate   Last exam/Treatment:  03/21/2018  Allergies:   Allergies   Allergen Reactions     Aspirin Unknown     As a kid     NURSING PLAN: Huddle with provider, plan includes will see JM today    RECOMMENDED DISPOSITION:  scheduled   Will comply with recommendation: Yes  If further questions/concerns or if symptoms do not improve, worsen or new symptoms develop, call your PCP or [facility] as soon as possible.    NOTES:  Disposition was determined by the first positive assessment question, therefore all previous assessment questions were negative    Guideline used:  Telephone Triage Protocols for Nurses, Fifth Edition, Clarice Balderas  Nursing Judgment  Huddled with FREYA Arguelles, RN, BSN

## 2018-10-31 NOTE — PROGRESS NOTES
SUBJECTIVE:   Raffaele Guzman is a 43 year old male who presents to clinic today for the following health issues:      Problem list and histories reviewed & adjusted, as indicated.  Additional history: none    He was deer hunting this past weekend and states he pulled a small tick out of his left arm today. The area has been red and slightly weeping. He denies fevers or significant pain.    His asthma has been well controlled as he only used the albuterol inhaler when his allergies are bad.     Reviewed and updated as needed this visit by clinical staff  Tobacco  Allergies  Meds  Problems  Med Hx  Surg Hx  Fam Hx  Soc Hx        Reviewed and updated as needed this visit by Provider  Allergies  Meds  Problems         ROS:  GENERAL: Denies fever, fatigue, weakness, weight gain, or weight loss.  CARDIOVASCULAR: Denies chest pain, shortness of breath, irregular heartbeats, palpitations, or edema.  RESPIRATORY: Denies cough, hemoptysis, and shortness of breath.  SKIN: +Reddened area surrounding scab from tick bite on left upper arm.     OBJECTIVE:     /82  Pulse 84  Temp 98.4  F (36.9  C) (Oral)  Resp 18  SpO2 96%  There is no height or weight on file to calculate BMI.  GENERAL: healthy, alert and no distress  RESP: lungs clear to auscultation - no rales, rhonchi or wheezes  CV: regular rate and rhythm, normal S1 S2, no S3 or S4, no murmur, click or rub  SKIN: Scabbing sore over left triceps area with surrounding warmth and erythema. No drainage or significant tenderness.     ASSESSMENT/PLAN:       ICD-10-CM    1. Tick bite, initial encounter W57.XXXA doxycycline (VIBRAMYCIN) 100 MG capsule   2. Cellulitis of left upper extremity L03.114    3. Mild intermittent asthma without complication J45.20        Tick bite with surrounding warmth and erythema consistent with cellulitis. He is unsure what type of tick it was but it was very small so likely a deer tick and he thinks it was embedded for 2 days.  Will treat with doxycycline for the infection and to cover for possible Lyme disease. He will monitor for any spreading redness, increasing pain or fevers and should be seen right away if any of these occur.    Asthma has been stable and he only uses his inhaler during allergy seasons.    Follow up annually.     Uche Crespo PA-C  [facility]

## 2018-10-31 NOTE — PROGRESS NOTES
SUBJECTIVE:   Raffaele Guzman is a 43 year old male who presents to clinic today for the following health issues:  {Provider please address medication reconciliation discrepancies--rooming staff please delete if no med/rec issues}    HPI  Concern - tick bite   Onset: removed today     Description:   Embedded tick bite removed this afternoon (10/31/2018) but has been there for a few days, above the elbow back of the left arm.  Occasional pain and discomfort, on the left arm. Increased warmth around the embedded site, redness measuring 10cm width and 8cm length. Occasional drainage from embedded site. Denies body aches, chills, sweats, fevers, headaches.    Intensity: mild    Progression of Symptoms:  NEW    Accompanying Signs & Symptoms:  Arm redness, pain, drainage     Previous history of similar problem:   NEW    Precipitating factors:   Worsened by: unknown     Alleviating factors:  Improved by: unknown     Therapies Tried and outcome: removed  Problem list and histories reviewed & adjusted, as indicated.  Additional history: as documented    {ACUTE Problem SUPERLIST - brief histories:293173}    {HIST REVIEW/ LINKS 2:576405}    {PROVIDER CHARTING PREFERENCE:034810}

## 2018-10-31 NOTE — TELEPHONE ENCOUNTER
Reason for call:  Patient reporting a symptom    Symptom or request: symptom    Duration (how long have symptoms been present): today    Have you been treated for this before? No    Additional comments: Pulled a tick off and it looks nasty wondering if he needs to be seen right away or if there is home care please call    Phone Number patient can be reached at:  Home number on file [phone] (home)    Best Time:  any    Can we leave a detailed message on this number:  YES    Call taken on 10/31/2018 at 4:00 PM by Tari Anderson

## 2018-11-01 ENCOUNTER — TELEPHONE (OUTPATIENT)
Dept: FAMILY MEDICINE | Facility: OTHER | Age: 43
End: 2018-11-01

## 2018-11-01 ASSESSMENT — ASTHMA QUESTIONNAIRES: ACT_TOTALSCORE: 23

## 2018-11-01 NOTE — TELEPHONE ENCOUNTER
Continue with Tylenol or ibuprofen as needed along with the antibiotics. Monitor for fevers, chills or worsening redness and call if any of this occurs. Also, if he develops severe neck pain, he should let us know.    Uche Crespo PA-C

## 2018-11-01 NOTE — TELEPHONE ENCOUNTER
Spoke with patient.  Was seen yesterday by JM.  Told to call if symptoms worsen.  Headaches started up the back of his neck and a dull pain on the left arm, that started about an hour ago. Did take tylenol:  Did help some with the headache.  Arm pain is still there. Redness looks about the same.      Has started antibiotic last night and second dose this am.  No fever.      Wondering if JM wanting to see him or what next step should be.    Route to provider for review and advice.    Anil Brannon, RN, BSN

## 2018-11-01 NOTE — TELEPHONE ENCOUNTER
Patient notified of recommendations, stated that he was feeling a little better, still having a little neck pain. Advised to call if symptoms worsen. Miryam CHILDERS

## 2019-01-15 DIAGNOSIS — J45.40 MODERATE PERSISTENT ASTHMA WITHOUT COMPLICATION: ICD-10-CM

## 2019-01-15 RX ORDER — ALBUTEROL SULFATE 90 UG/1
AEROSOL, METERED RESPIRATORY (INHALATION)
Qty: 8.5 G | Refills: 1 | Status: SHIPPED | OUTPATIENT
Start: 2019-01-15 | End: 2019-03-18

## 2019-01-15 NOTE — TELEPHONE ENCOUNTER
Albuterol:  Prescription approved per [facility] Refill Protocol.    Faith Lino, RN, BSN

## 2019-01-15 NOTE — TELEPHONE ENCOUNTER
Pt is calling on the status of his refill on the inhaler. He leaves to go out of town tomorrow and he cant find his old one to take with so he is needed this to be sent over today. Please advise.

## 2019-03-18 DIAGNOSIS — J45.40 MODERATE PERSISTENT ASTHMA WITHOUT COMPLICATION: ICD-10-CM

## 2019-03-18 RX ORDER — ALBUTEROL SULFATE 90 UG/1
AEROSOL, METERED RESPIRATORY (INHALATION)
Qty: 8.5 G | Refills: 0 | Status: SHIPPED | OUTPATIENT
Start: 2019-03-18 | End: 2019-04-30

## 2019-03-18 NOTE — TELEPHONE ENCOUNTER
Albuterol  Prescription approved per [facility] Refill Protocol.    Faith Lino, RN, BSN

## 2019-03-22 ENCOUNTER — TRANSFERRED RECORDS (OUTPATIENT)
Dept: HEALTH INFORMATION MANAGEMENT | Facility: CLINIC | Age: 44
End: 2019-03-22

## 2019-03-22 LAB
ALT SERPL-CCNC: 86 IU/L (ref 10–47)
AST SERPL-CCNC: 50 IU/L (ref 11–38)
CREAT SERPL-MCNC: 0.9 MG/DL (ref 0.6–1.2)
GFR SERPL CREATININE-BSD FRML MDRD: >60 ML/MIN
GLUCOSE SERPL-MCNC: 112 MG/DL (ref 73–118)
POTASSIUM SERPL-SCNC: 4.5 MMOL/L (ref 3.6–5.1)

## 2019-04-29 DIAGNOSIS — J45.40 MODERATE PERSISTENT ASTHMA WITHOUT COMPLICATION: ICD-10-CM

## 2019-04-30 RX ORDER — ALBUTEROL SULFATE 90 UG/1
AEROSOL, METERED RESPIRATORY (INHALATION)
Qty: 8.5 G | Refills: 1 | Status: SHIPPED | OUTPATIENT
Start: 2019-04-30 | End: 2019-10-09

## 2019-04-30 NOTE — TELEPHONE ENCOUNTER
Albuterol    Prescription approved per [facility] Refill Protocol.    Gillian Tejada, RN, BSN

## 2019-06-17 ENCOUNTER — OFFICE VISIT (OUTPATIENT)
Dept: FAMILY MEDICINE | Facility: CLINIC | Age: 44
End: 2019-06-17
Payer: COMMERCIAL

## 2019-06-17 VITALS
HEIGHT: 70 IN | BODY MASS INDEX: 31.81 KG/M2 | SYSTOLIC BLOOD PRESSURE: 120 MMHG | TEMPERATURE: 98 F | RESPIRATION RATE: 16 BRPM | HEART RATE: 68 BPM | OXYGEN SATURATION: 96 % | DIASTOLIC BLOOD PRESSURE: 80 MMHG | WEIGHT: 222.2 LBS

## 2019-06-17 DIAGNOSIS — J20.9 ACUTE BRONCHITIS WITH SYMPTOMS > 10 DAYS: Primary | ICD-10-CM

## 2019-06-17 DIAGNOSIS — J45.20 MILD INTERMITTENT ASTHMA WITHOUT COMPLICATION: ICD-10-CM

## 2019-06-17 PROCEDURE — 99214 OFFICE O/P EST MOD 30 MIN: CPT | Performed by: PHYSICIAN ASSISTANT

## 2019-06-17 RX ORDER — ALBUTEROL SULFATE 90 UG/1
2 AEROSOL, METERED RESPIRATORY (INHALATION) EVERY 6 HOURS
Qty: 1 INHALER | Refills: 2 | Status: SHIPPED | OUTPATIENT
Start: 2019-06-17 | End: 2019-12-31

## 2019-06-17 RX ORDER — PREDNISONE 20 MG/1
20 TABLET ORAL DAILY
Qty: 5 TABLET | Refills: 0 | Status: SHIPPED | OUTPATIENT
Start: 2019-06-17 | End: 2019-10-24

## 2019-06-17 RX ORDER — AZITHROMYCIN 250 MG/1
TABLET, FILM COATED ORAL
Qty: 6 TABLET | Refills: 0 | Status: SHIPPED | OUTPATIENT
Start: 2019-06-17 | End: 2019-10-09

## 2019-06-17 ASSESSMENT — PAIN SCALES - GENERAL: PAINLEVEL: MODERATE PAIN (4)

## 2019-06-17 ASSESSMENT — MIFFLIN-ST. JEOR: SCORE: 1899.14

## 2019-06-17 NOTE — PROGRESS NOTES
Subjective     Raffaele Guzman is a 43 year old male who presents to clinic today for the following health issues:    Patient wanted to know what difference between ventolin and proair inhaler. Patient states the ventolin cheaper for pt.   HPI   Asthma Follow-Up    Was ACT completed today?  No      Do you have a cough?  YES    Are you experiencing any wheezing in your chest?  YES    Do you have any shortness of breath?  YES     How often are you using a short-acting (rescue) inhaler or nebulizer, such as Albuterol?  Daily due of the cough     How many days per week do you miss taking your asthma controller medication?  I do not have an asthma controller medication    Please describe any recent triggers for your asthma: upper respiratory infections and allergy     Have you had any Emergency Room Visits, Urgent Care Visits, or Hospital Admissions since your last office visit?  No   Usually lose inhaler before it is out.  Uses 1 x per month.  Triggers- horses, Cut grass, hay on ranch, seasonally.   Does not use anti-histamine when around horses. He does not premedicate prior to exposure. Sometimes does not need it.   He does not like to take medications.   Does not use albuterol prior to being around his horse but uses after.     ACT Total Scores 8/31/2017 9/7/2017 10/31/2018   ACT TOTAL SCORE - - -   ASTHMA ER VISITS - - -   ASTHMA HOSPITALIZATIONS - - -   ACT TOTAL SCORE (Goal Greater than or Equal to 20) 21 21 23   In the past 12 months, how many times did you visit the emergency room for your asthma without being admitted to the hospital? 0 0 0   In the past 12 months, how many times were you hospitalized overnight because of your asthma? 0 0 0         Acute Illness   Acute illness concerns: cough   Onset: couple weeks   Started w/minor cold and coughing a lot. Progressively gotten worse.   Gets into uncontrollable coughing, mucus comes out.   Constant coughing  Achy and tired and foggy.   He tried dayquil and  nyquil- sedating.   Girlfriends father is MD.   Asthma as above  Work colleague had same illness before he did    Fever: no     Chills/Sweats: Yes-intermittent sweats     Headache (location?): no    Sinus Pressure:YES- possible due of allergy     Conjunctivitis:  no    Ear Pain: no    Rhinorrhea: YES- clear usually, thicker.     Congestion: YES    Sore Throat: no      Cough: YES-productive of clear sputum, productive of yellow sputum    Wheeze: YES    Decreased Appetite: no     Nausea: no    Vomiting: no    Diarrhea:  no    Dysuria/Freq.: no    Fatigue/Achiness: YES    Sick/Strep Exposure: no     CP from cough.   No leg swelling, no rashes  No GI sx.   No hx of pneumonia in adult years  No travel    Therapies Tried and outcome: OTC meds      Patient Active Problem List   Diagnosis     Allergy to mold spores     Seasonal allergic conjunctivitis     Seasonal allergic rhinitis     House dust mite allergy     Diagnostic skin and sensitization tests(aka ALLERGENS)     Hypovitaminosis D     Moderate persistent asthma     Nasal polyp     Anxiety     Concussion without loss of consciousness     Mild intermittent asthma     Pain in joint, multiple sites     Past Surgical History:   Procedure Laterality Date     HERNIORRHAPHY VENTRAL N/A 3/4/2015    Procedure: HERNIORRHAPHY VENTRAL;  Surgeon: Giovanni Greenfield MD;  Location:  OR       Social History     Tobacco Use     Smoking status: Current Some Day Smoker     Types: Cigars, Cigarettes     Last attempt to quit: 2003     Years since quittin.1     Smokeless tobacco: Never Used     Tobacco comment: not everyday- can go months without, then may have a few   Substance Use Topics     Alcohol use: Yes     Comment: occ. use- per week use- varies 0-4     Family History   Problem Relation Age of Onset     Diabetes No family hx of      Hyperlipidemia No family hx of      Hypertension No family hx of      Other Cancer No family hx of          Current Outpatient Medications  "  Medication Sig Dispense Refill     albuterol (PROAIR HFA/PROVENTIL HFA/VENTOLIN HFA) 108 (90 Base) MCG/ACT inhaler Inhale 2 puffs into the lungs every 6 hours 1 Inhaler 2     azithromycin (ZITHROMAX) 250 MG tablet Take 2 tablets (500 mg) by mouth daily for 1 day, THEN 1 tablet (250 mg) daily for 4 days. 6 tablet 0     multivitamin, therapeutic with minerals (MULTI-VITAMIN) TABS tablet Take 1 tablet by mouth daily       Omega-3 Fatty Acids (FISH OIL) 1000 MG CPDR        predniSONE (DELTASONE) 20 MG tablet Take 1 tablet (20 mg) by mouth daily 5 tablet 0     VITAMIN D, CHOLECALCIFEROL, PO Take by mouth daily       albuterol (PROAIR HFA) 108 (90 Base) MCG/ACT inhaler INHALE 2 PUFFS INTO THE LUNGS EVERY 6 HOURS AS NEEDED FOR SHORTNESS OF BREATH, DIFFICULT BREATHING, OR WHEEZING (Patient not taking: Reported on 6/17/2019) 8.5 g 1     Allergies   Allergen Reactions     Aspirin Unknown     As a kid     BP Readings from Last 3 Encounters:   06/17/19 120/80   10/31/18 122/82   03/21/18 118/70    Wt Readings from Last 3 Encounters:   06/17/19 100.8 kg (222 lb 3.2 oz)   03/21/18 93.5 kg (206 lb 3.2 oz)   12/18/17 94.3 kg (208 lb)                    Reviewed and updated as needed this visit by Provider         Review of Systems   ROS COMP: Constitutional, HEENT, cardiovascular, pulmonary, gi and gu systems are negative, except as otherwise noted.      Objective    /80   Pulse 68   Temp 98  F (36.7  C) (Oral)   Resp 16   Ht 1.778 m (5' 10\")   Wt 100.8 kg (222 lb 3.2 oz)   SpO2 96%   BMI 31.88 kg/m    Body mass index is 31.88 kg/m .  Physical Exam   GENERAL: healthy, alert and no distress  EYES: Eyes grossly normal to inspection, PERRL and conjunctivae and sclerae normal  HENT: ear canals and TM's normal, nose and mouth without ulcers or lesions  NECK: no adenopathy, no asymmetry, masses, or scars and thyroid normal to palpation  RESP: no cough, lungs clear to auscultation - no rales, rhonchi or wheezes  CV: regular " "rate and rhythm, normal S1 S2, no S3 or S4, no murmur, click or rub, no peripheral edema and peripheral pulses strong  MS: no gross musculoskeletal defects noted, no edema  NEURO: Normal strength and tone, mentation intact and speech normal  PSYCH: mentation appears normal, affect normal/bright    Diagnostic Test Results:  none         Assessment & Plan     1. Acute bronchitis with symptoms > 10 days  Prolonged URI sx with underlying asthma  Start treatment as below  If sx not improving f/u with repeat exam and CXR.   - azithromycin (ZITHROMAX) 250 MG tablet; Take 2 tablets (500 mg) by mouth daily for 1 day, THEN 1 tablet (250 mg) daily for 4 days.  Dispense: 6 tablet; Refill: 0  - predniSONE (DELTASONE) 20 MG tablet; Take 1 tablet (20 mg) by mouth daily  Dispense: 5 tablet; Refill: 0    2. Mild intermittent asthma without complication  Discussed asthma  He found out this year ventolin cheaper per insurance than proair. Refilled   AAP done  Advised taking antihistamine and using inhaler prior to horse exposure (known trigger).   Avoid triggers  Typically 1 inhaler lasts more than a year. S/sx of uncontrolled asthma/flares discussed  - albuterol (PROAIR HFA/PROVENTIL HFA/VENTOLIN HFA) 108 (90 Base) MCG/ACT inhaler; Inhale 2 puffs into the lungs every 6 hours  Dispense: 1 Inhaler; Refill: 2     Tobacco Cessation:   reports that he has been smoking cigars and cigarettes.  He has never used smokeless tobacco.  Tobacco Cessation Action Plan: Information offered: Patient not interested at this time      BMI:   Estimated body mass index is 31.88 kg/m  as calculated from the following:    Height as of this encounter: 1.778 m (5' 10\").    Weight as of this encounter: 100.8 kg (222 lb 3.2 oz).   Weight management plan: Discussed healthy diet and exercise guidelines        Follow Up: The patient was instructed to contact clinic for worsening symptoms, non-improvement as expected/discussed, and for questions regarding " medications or treatment plan. Discussed parameters for follow up and included in After Visit Summary given to patient.      Return for Recheck- 1 week if not improving .    Faith Hunt PA-C  [facility]

## 2019-06-17 NOTE — LETTER
My Asthma Action Plan  Name: Raffaele Guzman   YOB: 1975  Date: 6/17/2019   My doctor: Faith Hunt PA-C   My clinic: [facility]        My Control Medicine: None  My Rescue Medicine: Albuterol (Proair/Ventolin/Proventil) inhaler 2 puffs every 4-6 hrs as needed; take 15min prior to trigger exposure (horses)   My Asthma Severity: intermittent  Avoid your asthma triggers: horses  upper respiratory infections  allergy             GREEN ZONE   Good Control    I feel good    No cough or wheeze    Can work, sleep and play without asthma symptoms       Take your asthma control medicine every day.     1. If exercise triggers your asthma, take your rescue medication    15 minutes before exercise or sports, and    During exercise if you have asthma symptoms  2. Spacer to use with inhaler: If you have a spacer, make sure to use it with your inhaler             YELLOW ZONE Getting Worse  I have ANY of these:    I do not feel good    Cough or wheeze    Chest feels tight    Wake up at night   1. Keep taking your Green Zone medications  2. Start taking your rescue medicine:    every 20 minutes for up to 1 hour. Then every 4 hours for 24-48 hours.  3. If you stay in the Yellow Zone for more than 12-24 hours, contact your doctor.  4. If you do not return to the Green Zone in 12-24 hours or you get worse, start taking your oral steroid medicine if prescribed by your provider.           RED ZONE Medical Alert - Get Help  I have ANY of these:    I feel awful    Medicine is not helping    Breathing getting harder    Trouble walking or talking    Nose opens wide to breathe       1. Take your rescue medicine NOW  2. If your provider has prescribed an oral steroid medicine, start taking it NOW  3. Call your doctor NOW  4. If you are still in the Red Zone after 20 minutes and you have not reached your doctor:    Take your rescue medicine again and    Call 911 or go to the emergency room right away    See  your regular doctor within 2 weeks of an Emergency Room or Urgent Care visit for follow-up treatment.          Annual Reminders:  Meet with Asthma Educator,  Flu Shot in the Fall, consider Pneumonia Vaccination for patients with asthma (aged 19 and older).    Pharmacy:    [facility] [id number] [place], [place] - [address] AT [facility] [address]  [facility] [address] [place], [place] - [address] AT [facility] [address]                      Asthma Triggers  How To Control Things That Make Your Asthma Worse    Triggers are things that make your asthma worse.  Look at the list below to help you find your triggers and what you can do about them.  You can help prevent asthma flare-ups by staying away from your triggers.      Trigger                                                          What you can do   Cigarette Smoke  Tobacco smoke can make asthma worse. Do not allow smoking in your home, car or around you.  Be sure no one smokes at a child s day care or school.  If you smoke, ask your health care provider for ways to help you quit.  Ask family members to quit too.  Ask your health care provider for a referral to Quit Plan to help you quit smoking, or call 6-023-401-PLAN.     Colds, Flu, Bronchitis  These are common triggers of asthma. Wash your hands often.  Don t touch your eyes, nose or mouth.  Get a flu shot every year.     Dust Mites  These are tiny bugs that live in cloth or carpet. They are too small to see. Wash sheets and blankets in hot water every week.   Encase pillows and mattress in dust mite proof covers.  Avoid having carpet if you can. If you have carpet, vacuum weekly.   Use a dust mask and HEPA vacuum.   Pollen and Outdoor Mold  Some people are allergic to trees, grass, or weed pollen, or molds. Try to keep your windows closed.  Limit time out doors when pollen count is high.   Ask you health care provider about taking medicine during allergy season.      Animal Dander  Some people are allergic to skin flakes, urine or saliva from pets with fur or feathers. Keep pets with fur or feathers out of your home.    If you can t keep the pet outdoors, then keep the pet out of your bedroom.  Keep the bedroom door closed.  Keep pets off cloth furniture and away from stuffed toys.     Mice, Rats, and Cockroaches  Some people are allergic to the waste from these pests.   Cover food and garbage.  Clean up spills and food crumbs.  Store grease in the refrigerator.   Keep food out of the bedroom.   Indoor Mold  This can be a trigger if your home has high moisture. Fix leaking faucets, pipes, or other sources of water.   Clean moldy surfaces.  Dehumidify basement if it is damp and smelly.   Smoke, Strong Odors, and Sprays  These can reduce air quality. Stay away from strong odors and sprays, such as perfume, powder, hair spray, paints, smoke incense, paint, cleaning products, candles and new carpet.   Exercise or Sports  Some people with asthma have this trigger. Be active!  Ask your doctor about taking medicine before sports or exercise to prevent symptoms.    Warm up for 5-10 minutes before and after sports or exercise.     Other Triggers of Asthma  Cold air:  Cover your nose and mouth with a scarf.  Sometimes laughing or crying can be a trigger.  Some medicines and food can trigger asthma.

## 2019-06-17 NOTE — PATIENT INSTRUCTIONS
Refilled the ventolin/albuterol     Prednisone 20mg x 5d  zithromax antibiotic     You were prescribed a steroid. This is a strong anti-inflammatory.   Primary side effects can include insomnia (trouble with sleep), increased appetite, and irritability/moodiness.  By dosing this medication earlier in the day (taking in the morning and/or at lunch) can help reduce the sleep side effects.    Do not take NSAIDs while taking prednisone (examples of NSAIDs: ibuprofen, Advil, Aleve, naproxen, diclofenac, celebrex, etc).

## 2019-06-18 ASSESSMENT — ASTHMA QUESTIONNAIRES: ACT_TOTALSCORE: 20

## 2019-10-09 ENCOUNTER — OFFICE VISIT (OUTPATIENT)
Dept: SLEEP MEDICINE | Facility: CLINIC | Age: 44
End: 2019-10-09
Payer: COMMERCIAL

## 2019-10-09 VITALS
SYSTOLIC BLOOD PRESSURE: 112 MMHG | HEIGHT: 70 IN | BODY MASS INDEX: 32.24 KG/M2 | WEIGHT: 225.2 LBS | OXYGEN SATURATION: 95 % | DIASTOLIC BLOOD PRESSURE: 72 MMHG | HEART RATE: 87 BPM

## 2019-10-09 DIAGNOSIS — G47.33 OSA (OBSTRUCTIVE SLEEP APNEA): Primary | ICD-10-CM

## 2019-10-09 PROCEDURE — 99205 OFFICE O/P NEW HI 60 MIN: CPT | Performed by: INTERNAL MEDICINE

## 2019-10-09 ASSESSMENT — MIFFLIN-ST. JEOR: SCORE: 1907.75

## 2019-10-09 NOTE — PATIENT INSTRUCTIONS
Your BMI is Body mass index is 32.31 kg/m .  Weight management is a personal decision.  If you are interested in exploring weight loss strategies, the following discussion covers the approaches that may be successful. Body mass index (BMI) is one way to tell whether you are at a healthy weight, overweight, or obese. It measures your weight in relation to your height.  A BMI of 18.5 to 24.9 is in the healthy range. A person with a BMI of 25 to 29.9 is considered overweight, and someone with a BMI of 30 or greater is considered obese. More than two-thirds of American adults are considered overweight or obese.  Being overweight or obese increases the risk for further weight gain. Excess weight may lead to heart disease and diabetes.  Creating and following plans for healthy eating and physical activity may help you improve your health.  Weight control is part of healthy lifestyle and includes exercise, emotional health, and healthy eating habits. Careful eating habits lifelong are the mainstay of weight control. Though there are significant health benefits from weight loss, long-term weight loss with diet alone may be very difficult to achieve- studies show long-term success with dietary management in less than 10% of people. Attaining a healthy weight may be especially difficult to achieve in those with severe obesity. In some cases, medications, devices and surgical management might be considered.  What can you do?  If you are overweight or obese and are interested in methods for weight loss, you should discuss this with your provider.     Consider reducing daily calorie intake by 500 calories.     Keep a food journal.     Avoiding skipping meals, consider cutting portions instead.    Diet combined with exercise helps maintain muscle while optimizing fat loss. Strength training is particularly important for building and maintaining muscle mass. Exercise helps reduce stress, increase energy, and improves fitness.  Increasing exercise without diet control, however, may not burn enough calories to loose weight.       Start walking three days a week 10-20 minutes at a time    Work towards walking thirty minutes five days a week     Eventually, increase the speed of your walking for 1-2 minutes at time    In addition, we recommend that you review healthy lifestyles and methods for weight loss available through the National Institutes of Health patient information sites:  http://win.niddk.nih.gov/publications/index.htm    And look into health and wellness programs that may be available through your health insurance provider, employer, local community center, or danielle club.    Weight management plan: Patient was referred to their PCP to discuss a diet and exercise plan.     Drive Safe... Drive Alive     Sleep health profoundly affects your health, mood, and your safety. 33% of the population (one in three of us) is not getting enough sleep and many have a sleep disorder. Not getting enough sleep or having an untreated / undertreated sleep condition may make us sleepy without even knowing it. In fact, our driving could be dramatically impaired due to our sleep health. As your provider, here are some things I would like you to know about driving:     Here are some warning signs for impairment and dangerous drowsy driving:              -Having been awake more than 16 hours               -Looking tired               -Eyelid drooping              -Head nodding (it could be too late at this point)              -Driving for more than 30 minutes     Some things you could do to make the driving safer if you are experiencing some drowsiness:              -Stop driving and rest              -Call for transportation              -Make sure your sleep disorder is adequately treated     Some things that have been shown NOT to work when experiencing drowsiness while driving:              -Turning on the radio              -Opening windows               -Eating any  distracting  /  entertaining  foods (e.g., sunflower seeds, candy, or any other)              -Talking on the phone      Your decision may not only impact your life, but also the life of others. Please, remember to drive safe for yourself and all of us.    Georges Leyva MD

## 2019-10-09 NOTE — NURSING NOTE
Does Raffaele have a CPAP/Bipap?  No     Weight management plan: Patient was referred to their PCP to discuss a diet and exercise plan.

## 2019-10-09 NOTE — PROGRESS NOTES
SLEEP MEDICINE CLINIC NOTE   [facility] SLEEP DISORDER CENTER  Raffaele Guzman 44 year old male  : 1975  MRN: [id number]      PRIMARY CARE PROVIDER: Uche Crespo    REFERRING PROVIDER: No referring provider defined for this encounter.    Visit Date:   10/09/2019      REASON FOR VISIT:  Evaluation for obstructive sleep apnea given snoring and excessive daytime sleepiness.      HISTORY OF PRESENT ILLNESS:  The patient is a very pleasant 44-year-old gentleman, who is seen in clinic for evaluation of sleep apnea given concerns for snoring and excessive daytime sleepiness.  His comorbidities include mild intermittent asthma, tobacco dependence, alcohol dependence, anxiety and a recent diagnosis of hyperferritinemia and macrocytosis.  The patient reports he has been experiencing these symptoms for a number of years.  He describes his current routine as going to sleep between 9:30 and 10 p.m.  It takes him just a few minutes to fall asleep.  He reports waking up up to once through the night to use the bathroom.  It is easy for him to return to sleep.  He finally wakes up between 6:00 and 7 a.m. with the help of an alarm.  He generally feels rested upon awakening.  He does not have any significant sleep inertia, but reports excessive daytime sleepiness, particularly in the afternoon.  His [place] Sleepiness Score is 9/24 with no chances of falling asleep while driving.  He drinks 1-2 cups of coffee per day.  He does not take frequent naps.  He follows the same schedule every single day of the week.      He denies any features of motor restlessness suggestive of restless legs syndrome.  He does report frequent dreams, but these are not bothersome.  He reports that approximately 3 years ago, there was a 1-month period where he would frequently sleepwalk and find himself outside his house.  This has not happened recently.  He was not on any medications at that time.  He denies any dream enactment  "behavior.  He denies any bruxism.      He denies waking up with a headache in the morning.  He reports snoring, which is very loud in intensity.  He does not have any snort arousals or witnessed apneas.  He often wakes up with a dry mouth.  He does not have any difficulty breathing through his nose.  He does not have GERD.  He prefers to sleep either on his left side or in the prone position.      He denies any features of hypocretin deficiency including cataplexy, sleep paralysis or hypnagogic or hypnopompic hallucinations.      SOCIAL HISTORY:  The patient is single and lives at home by himself.  He has a girlfriend that he sees very often.  He reports smoking a few cigarettes per week.  He reports drinking 3-4 nights per week.  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      FAMILY HISTORY:  He reports that his father has obstructive sleep apnea, which was initially diagnosed at age 78 and uses a CPAP.      PAST SURGICAL HISTORY:  Includes hernia repair in 2015.      PAST MEDICAL HISTORY:  Includes tobacco dependence, alcohol dependence, hyperferritinemia, mild persistent asthma, anxiety, obesity, and seasonal allergic rhinitis.      MEDICATIONS:  Albuterol inhaler as needed.      REVIEW OF SYSTEMS:  A complete 14-point review of systems was performed and found negative except as noted above.  The patient does report having gained approximately 20 pounds of weight within the last 1-2 years.      PHYSICAL EXAMINATION:   /72   Pulse 87   Ht 1.778 m (5' 10\")   Wt 102.2 kg (225 lb 3.2 oz)   SpO2 95%   BMI 32.31 kg/m    GENERAL:  A pleasant gentleman sitting comfortably and speaking full sentences without any discomfort.   HEENT:  Mallampati class 3 airway with retrognathia noted.  Malocclusion with overlapping dentition in the lower teeth noted.   NECK:  Circumference 43 cm.  No cervical or submandibular lymphadenopathy.  No obstruction to flow in the " nares.   PULMONARY:  Normal intensity breath sounds bilaterally with no added sounds.   CARDIOVASCULAR:  S1, S2 normal with no murmur, rubs or gallops.  Regular rate and rhythm.   ABDOMEN:  Soft, nontender, nondistended, normoactive bowel sounds.   NEUROLOGIC:  Grossly intact.   PSYCHIATRIC:  Normal affect.   SKIN:  No rashes on limited exam.      ASSESSMENT AND PLAN:  The patient is a very pleasant 44-year-old gentleman with multiple comorbidities who is being evaluated for sleep-disordered breathing.  We reviewed in detail about the pathophysiology of obstructive sleep apnea, as well as its various treatment options.  Given that the overall pretest probability for sleep apnea is intermediate, the patient is not a good candidate for a home sleep test.  We will perform an in-lab PSG and he will return to clinic after the test is completed to review the results and discuss treatment options.  He was advised not to drive or operate heavy machinery if drowsy or sleepy.  He was counseled on the importance of smoking cessation and regarding maintaining a healthy weight.      I spent a total of 60 minutes face to face with Kiara L Hagen during today's office visit. Over 50% of this time was spent counseling the patient and/or coordinating care regarding their sleep disorder.     Georges Leyva MD   of Medicine  Pulmonary, Critical Care and Sleep Medicine  [facility]  Pager: [phone]                 D: 10/09/2019   T: 10/09/2019   MT: FREYA      Name:     KIARA HAGEN   MRN:      [id number]        Account:      [id number]   :      1975           Visit Date:   10/09/2019      Document: [id number]

## 2019-10-16 DIAGNOSIS — J45.40 MODERATE PERSISTENT ASTHMA WITHOUT COMPLICATION: ICD-10-CM

## 2019-10-16 RX ORDER — ALBUTEROL SULFATE 90 UG/1
AEROSOL, METERED RESPIRATORY (INHALATION)
Qty: 8.5 G | Refills: 0 | Status: SHIPPED | OUTPATIENT
Start: 2019-10-16 | End: 2019-10-24

## 2019-10-16 NOTE — TELEPHONE ENCOUNTER
Pending Prescriptions:                       Disp   Refills    PROAIR  (90 Base) MCG/ACT inhaler *8.5 g  0            Sig: INHALE 2 PUFFS INTO THE LUNGS EVERY 6 HOURS AS           NEEDED FOR SHORTNESS OF BREATH/DYSPNEA OR           WHEEZING    ACT Total Scores 6/17/2019   ACT TOTAL SCORE -   ASTHMA ER VISITS -   ASTHMA HOSPITALIZATIONS -   ACT TOTAL SCORE (Goal Greater than or Equal to 20) 20   In the past 12 months, how many times did you visit the emergency room for your asthma without being admitted to the hospital? 0   In the past 12 months, how many times were you hospitalized overnight because of your asthma? 0     Medication is being filled for 1 time refill only due to:  due for physical     Please schedule OV    Gillian Tejada, RN, BSN

## 2019-10-16 NOTE — TELEPHONE ENCOUNTER
Patient informed. He states he is not interested in scheduling. He then asked to leave a message for management to call him on Thursday regarding being charged more for his previous inhaler until he found out the ProAir is cheaper? I left an email for Brandy to call him Thursday.

## 2019-10-22 ENCOUNTER — NURSE TRIAGE (OUTPATIENT)
Dept: NURSING | Facility: CLINIC | Age: 44
End: 2019-10-22

## 2019-10-22 NOTE — PROGRESS NOTES
Subjective     Raffaele Guzman is a 44 year old male who presents to clinic today for the following health issues:    * X-ray first* - CDL    HPI   Joint Pain - Fell, tail bone pain    Onset: last saturday    Description:   Location: tailbone  Character: Sharp, Dull ache, Stabbing, Gnawing, Burning, Fullness and Cramping    Intensity: 1/10     Progression of Symptoms: better, no blood in stool     Accompanying Signs & Symptoms:  Other symptoms: swelling    History:   Previous similar pain: no       Precipitating factors:   Trauma or overuse: YES- potentially fell off bed     Alleviating factors:  Improved by: bloody bowel movement helped relieve pressure today   Therapies Tried and outcome: none    - Fell then got worse over a few days   - Was on a road trip   - No change to bowel movements   - Stretching - bending to pick something up, getting off toliet, getting in and out of truck    - Had once bowel movement that was bloody after with wiping, then after that back pain started to get better       Tuesday was this   - Since then leaking bloody pussy stool mucus   - Maroon looking wiping   - Stool today was normal   - Pain with movement now gone     - Previously, 3x/day never regular, Gilbert 3-4     - No family history of GI issues   - No changes to urination     - No abdominal pain     - Hemochromatosis     Elevated liver enzyemes      At that time was fatigued and dizzy     - Sleep study coming up     - Anxiety not an issue any more     RN Triage note from 10/22/19   Pt believes he fell onto his buttocks on Saturday while sleepwalking in his RV.  He is having tailbone pain which gets worse with coughing, urinating, and certain movements.  He used Biofreeze this morning which helped with the pain.  Disposition:  See a provider within 3 days.  He verbalized understanding and had no further questions.      Alysha Crystal, RN/FNA           Review of Systems   ROS COMP: Constitutional, HEENT, cardiovascular,  "pulmonary, gi, psychiatric, neurological, and gu systems are negative, except as otherwise noted.      Objective    /84   Pulse 98   Temp 97  F (36.1  C) (Temporal)   Resp 14   Ht 1.778 m (5' 10\")   Wt 102.5 kg (226 lb)   SpO2 97%   BMI 32.43 kg/m    Body mass index is 32.43 kg/m .  Physical Exam   GENERAL APPEARANCE: healthy, alert and no distress  EYES: Eyes grossly normal to inspection, PERRLA, conjunctivae and sclerae without injection or discharge, EOM intact  MS: No musculoskeletal defects are noted and gait is age appropriate without ataxia   ABD: Normal bowel sounds, not tender, no rebound, no guarding, no masses or hepatosplenomegaly   Rectal exam: genitals normal without hernia, guiac negative stool, prostate normal in size without masses or nodules, normal sphincter tone, light brown mucus looking stool around anus    SKIN: No suspicious lesions or rashes, hydration status appears adeuqate with normal skin turgor   NEURO: Strength 5+ bilateral upper and lower extremities, sensation intact in distal bilateral upper and lower extremities, mentation- intact, speech- normal, reflexes- symmetric in bilateral upper and lower extremities, cranial nerves II-XII tested and are intact, normal anal wink   BACK: No CVA tenderness, no paralumbar tenderness, no midline tenderness, normal ROM   PSYCH: Alert and oriented x3; speech- coherent , normal rate and volume; able to articulate logical thoughts, able to abstract reason, no tangential thoughts, no hallucinations or delusions, mentation appears normal, Mood is euthymic. Affect is appropriate for this mood state and bright. Thought content is free of suicidal ideation, hallucinations, and delusions. Dress is adequate and upkept. Eye contact is good during conversation.       Diagnostic Test Results:  Labs reviewed in [facility]  Stool Guaiac - negative    XR Sacrum and Coccyx - normal with no fractures   See remaining order pending in [facility]         "     Assessment & Plan       ICD-10-CM    1. Fall, initial encounter W19.XXXA XR Sacrum and Coccyx 2 Views   2. Pain in the coccyx M53.3 XR Sacrum and Coccyx 2 Views     Occult blood stool     CBC with platelets and differential     Comprehensive metabolic panel   3. Blood in stool K92.1 Occult blood stool     CBC with platelets and differential     Comprehensive metabolic panel     GASTROENTEROLOGY ADULT REF PROCEDURE ONLY None     Reticulocyte count     Enteric Bacteria and Virus Panel by GUILLERMO Stool     Clostridium difficile Toxin B PCR     Ova and Parasite Exam Routine     Ova and Parasite Exam Routine     Clostridium difficile Toxin B PCR     Enteric Bacteria and Virus Panel by GUILLERMO Stool   4. Incontinence of feces, unspecified fecal incontinence type R15.9 Occult blood stool     CBC with platelets and differential     Comprehensive metabolic panel     GASTROENTEROLOGY ADULT REF PROCEDURE ONLY None     Ferritin     Iron and iron binding capacity     Enteric Bacteria and Virus Panel by GUILLERMO Stool     Clostridium difficile Toxin B PCR     Ova and Parasite Exam Routine     Ova and Parasite Exam Routine     Clostridium difficile Toxin B PCR     Enteric Bacteria and Virus Panel by GUILLERMO Stool   5. 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 Mucus in stool R19.5 Enteric Bacteria and Virus Panel by GUILLERMO Stool     Clostridium difficile Toxin B PCR     Ova and Parasite Exam Routine     Ova and Parasite Exam Routine     Clostridium difficile Toxin B PCR     Enteric Bacteria and Virus Panel by GUILLERMO Stool     - Patient with fall 6 days ago from top bunk in  onto his tailbone, he had severe pain for 3 days, then had very painful and large BM that had lots of mucus and blood. After this, pain was gone but patient had increased diarrhea (dealt with some diarrhea for his whole life going 3x daily usually bristol 3-4), bloody diarrhea, and stool incontinence of small amounts,  noting also that bowel movements were decreased in caliber (bristol 4-6 present)   - No abdominal pain, no further back or pelvic pain, no changes to urination   - Concern for spinal injury w/wo bowel injury vs. More chronic process like inflammatory bowel due to constant diarrhea vs. Inflection vs Other   - Neurological exam including anal wink were normal, normal anal sphincter tones   - Discussed the fall and GI issues may or may not be related   - Recommend labs and ASAP colonoscopy   - Patient then reported working with [facility] for iron overload, will get labs he needs for follow up in the next few weeks      Could possibly be reason he has diarrhea   - With mucus stools, concern would be for infectious process, could have [place] something [place] or while traveling   - Recommend stool cultures as well   - Await all results   - Discussed warning signs that would warrant return to clinic/ED     The patient indicates understanding of these issues and agrees with the plan.    Return in about 1 week (around 10/31/2019).    A total of 50 minutes was spent with the patient today, with greater than 50% of the visit involving counseling and coordination of care.      Jaja Tomas PA-C  [facility]

## 2019-10-22 NOTE — TELEPHONE ENCOUNTER
Pt believes he fell onto his buttocks on Saturday while sleepwalking in his RV.  He is having tailbone pain which gets worse with coughing, urinating, and certain movements.  He used Biofreeze this morning which helped with the pain.    Disposition:  See a provider within 3 days.  He verbalized understanding and had no further questions.     Alysha Crystal RN/FNA    Reason for Disposition    [1] After 3 days (72 hours) AND [2] pain not improving    Additional Information    Negative: Dangerous mechanism of injury (e.g., fall > 10 feet or 3 meters, trampoline)(Exception: pain began > 1 hour after injury)    Negative: Sounds like a life-threatening emergency to the triager    Negative: Injury to back above tailbone    Negative: Wound looks infected    Negative: Can't walk or very difficult to walk    Negative: [1] Unable to urinate (or only a few drops) > 4 hours AND     [2] bladder feels very full (e.g., palpable bladder or strong urge to urinate)    Negative: [1] Urinary incontinence (i.e., loss of bladder control) AND [2] new onset    Negative: Numbness (loss of sensation) in groin or rectal area    Negative: Blood in urine (red, pink, or tea-colored)    Negative: Blood in stool    Negative: Weakness of a leg or foot (e.g., unable to bear weight, dragging foot)    Negative: Numbness in a leg or foot (i.e., loss of sensation)    Negative: [1] SEVERE pain AND [2] not improved 2 hours after pain medicine/ice packs    Negative: Pain radiates into the thigh or further down the leg now    Negative: [1] High-risk adult (e.g., age > 60, osteoporosis, chronic steroid use) AND [2] still hurts    Protocols used: TAILBONE INJURY-A-

## 2019-10-24 ENCOUNTER — OFFICE VISIT (OUTPATIENT)
Dept: FAMILY MEDICINE | Facility: OTHER | Age: 44
End: 2019-10-24
Payer: COMMERCIAL

## 2019-10-24 ENCOUNTER — ANCILLARY PROCEDURE (OUTPATIENT)
Dept: GENERAL RADIOLOGY | Facility: OTHER | Age: 44
End: 2019-10-24
Attending: PHYSICIAN ASSISTANT
Payer: COMMERCIAL

## 2019-10-24 ENCOUNTER — HOSPITAL ENCOUNTER (OUTPATIENT)
Facility: CLINIC | Age: 44
End: 2019-10-24
Attending: SURGERY | Admitting: SURGERY
Payer: COMMERCIAL

## 2019-10-24 VITALS
RESPIRATION RATE: 14 BRPM | HEIGHT: 70 IN | DIASTOLIC BLOOD PRESSURE: 84 MMHG | TEMPERATURE: 97 F | HEART RATE: 98 BPM | SYSTOLIC BLOOD PRESSURE: 130 MMHG | WEIGHT: 226 LBS | OXYGEN SATURATION: 97 % | BODY MASS INDEX: 32.35 KG/M2

## 2019-10-24 DIAGNOSIS — M53.3 PAIN IN THE COCCYX: ICD-10-CM

## 2019-10-24 DIAGNOSIS — K92.1 BLOOD IN STOOL: ICD-10-CM
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
## 2019-10-24 DIAGNOSIS — R19.5 MUCUS IN STOOL: ICD-10-CM
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
## 2019-10-24 DIAGNOSIS — R79.89 GENETIC HYPERFERRITINEMIA WITHOUT IRON OVERLOAD: ICD-10-CM

## 2019-10-24 DIAGNOSIS — R15.9 INCONTINENCE OF FECES, UNSPECIFIED FECAL INCONTINENCE TYPE: ICD-10-CM

## 2019-10-24 LAB
ALBUMIN SERPL-MCNC: 3.8 G/DL (ref 3.4–5)
ALP SERPL-CCNC: 69 U/L (ref 40–150)
ALT SERPL W P-5'-P-CCNC: 83 U/L (ref 0–70)
ANION GAP SERPL CALCULATED.3IONS-SCNC: 6 MMOL/L (ref 3–14)
AST SERPL W P-5'-P-CCNC: 41 U/L (ref 0–45)
BASOPHILS # BLD AUTO: 0 10E9/L (ref 0–0.2)
BASOPHILS NFR BLD AUTO: 0.3 %
BILIRUB SERPL-MCNC: 0.6 MG/DL (ref 0.2–1.3)
BUN SERPL-MCNC: 10 MG/DL (ref 7–30)
C DIFF TOX B STL QL: NEGATIVE
CALCIUM SERPL-MCNC: 9.1 MG/DL (ref 8.5–10.1)
CHLORIDE SERPL-SCNC: 104 MMOL/L (ref 94–109)
CO2 SERPL-SCNC: 28 MMOL/L (ref 20–32)
CREAT SERPL-MCNC: 0.93 MG/DL (ref 0.66–1.25)
DIFFERENTIAL METHOD BLD: ABNORMAL
EOSINOPHIL # BLD AUTO: 0.2 10E9/L (ref 0–0.7)
EOSINOPHIL NFR BLD AUTO: 4.2 %
ERYTHROCYTE [DISTWIDTH] IN BLOOD BY AUTOMATED COUNT: 11.9 % (ref 10–15)
FERRITIN SERPL-MCNC: 526 NG/ML (ref 26–388)
GFR SERPL CREATININE-BSD FRML MDRD: >90 ML/MIN/{1.73_M2}
GLUCOSE SERPL-MCNC: 106 MG/DL (ref 70–99)
HCT VFR BLD AUTO: 45.9 % (ref 40–53)
HEMOCCULT STL QL: NEGATIVE
HGB BLD-MCNC: 15.8 G/DL (ref 13.3–17.7)
IRON SATN MFR SERPL: 40 % (ref 15–46)
IRON SERPL-MCNC: 134 UG/DL (ref 35–180)
LYMPHOCYTES # BLD AUTO: 2.3 10E9/L (ref 0.8–5.3)
LYMPHOCYTES NFR BLD AUTO: 39.6 %
MCH RBC QN AUTO: 34.4 PG (ref 26.5–33)
MCHC RBC AUTO-ENTMCNC: 34.4 G/DL (ref 31.5–36.5)
MCV RBC AUTO: 100 FL (ref 78–100)
MONOCYTES # BLD AUTO: 0.6 10E9/L (ref 0–1.3)
MONOCYTES NFR BLD AUTO: 9.6 %
NEUTROPHILS # BLD AUTO: 2.7 10E9/L (ref 1.6–8.3)
NEUTROPHILS NFR BLD AUTO: 46.3 %
PLATELET # BLD AUTO: 214 10E9/L (ref 150–450)
POTASSIUM SERPL-SCNC: 4.2 MMOL/L (ref 3.4–5.3)
PROT SERPL-MCNC: 8 G/DL (ref 6.8–8.8)
RBC # BLD AUTO: 4.59 10E12/L (ref 4.4–5.9)
RETICS # AUTO: 89.7 10E9/L (ref 25–95)
RETICS/RBC NFR AUTO: 2 % (ref 0.5–2)
SODIUM SERPL-SCNC: 138 MMOL/L (ref 133–144)
SPECIMEN SOURCE: NORMAL
TIBC SERPL-MCNC: 334 UG/DL (ref 240–430)
WBC # BLD AUTO: 5.7 10E9/L (ref 4–11)

## 2019-10-24 PROCEDURE — 87177 OVA AND PARASITES SMEARS: CPT | Performed by: PHYSICIAN ASSISTANT

## 2019-10-24 PROCEDURE — 72220 X-RAY EXAM SACRUM TAILBONE: CPT

## 2019-10-24 PROCEDURE — 85045 AUTOMATED RETICULOCYTE COUNT: CPT | Performed by: PHYSICIAN ASSISTANT

## 2019-10-24 PROCEDURE — 83540 ASSAY OF IRON: CPT | Performed by: PHYSICIAN ASSISTANT

## 2019-10-24 PROCEDURE — 87506 IADNA-DNA/RNA PROBE TQ 6-11: CPT | Performed by: PHYSICIAN ASSISTANT

## 2019-10-24 PROCEDURE — 87209 SMEAR COMPLEX STAIN: CPT | Performed by: PHYSICIAN ASSISTANT

## 2019-10-24 PROCEDURE — 83550 IRON BINDING TEST: CPT | Performed by: PHYSICIAN ASSISTANT

## 2019-10-24 PROCEDURE — 82272 OCCULT BLD FECES 1-3 TESTS: CPT | Performed by: PHYSICIAN ASSISTANT

## 2019-10-24 PROCEDURE — 36415 COLL VENOUS BLD VENIPUNCTURE: CPT | Performed by: PHYSICIAN ASSISTANT

## 2019-10-24 PROCEDURE — 80053 COMPREHEN METABOLIC PANEL: CPT | Performed by: PHYSICIAN ASSISTANT

## 2019-10-24 PROCEDURE — 99215 OFFICE O/P EST HI 40 MIN: CPT | Performed by: PHYSICIAN ASSISTANT

## 2019-10-24 PROCEDURE — 87493 C DIFF AMPLIFIED PROBE: CPT | Performed by: PHYSICIAN ASSISTANT

## 2019-10-24 PROCEDURE — 85025 COMPLETE CBC W/AUTO DIFF WBC: CPT | Performed by: PHYSICIAN ASSISTANT

## 2019-10-24 PROCEDURE — 82728 ASSAY OF FERRITIN: CPT | Performed by: PHYSICIAN ASSISTANT

## 2019-10-24 ASSESSMENT — PAIN SCALES - GENERAL: PAINLEVEL: NO PAIN (1)

## 2019-10-24 ASSESSMENT — PATIENT HEALTH QUESTIONNAIRE - PHQ9
SUM OF ALL RESPONSES TO PHQ QUESTIONS 1-9: 4
SUM OF ALL RESPONSES TO PHQ QUESTIONS 1-9: 4
10. IF YOU CHECKED OFF ANY PROBLEMS, HOW DIFFICULT HAVE THESE PROBLEMS MADE IT FOR YOU TO DO YOUR WORK, TAKE CARE OF THINGS AT HOME, OR GET ALONG WITH OTHER PEOPLE: NOT DIFFICULT AT ALL

## 2019-10-24 ASSESSMENT — ANXIETY QUESTIONNAIRES
7. FEELING AFRAID AS IF SOMETHING AWFUL MIGHT HAPPEN: NOT AT ALL
6. BECOMING EASILY ANNOYED OR IRRITABLE: NOT AT ALL
1. FEELING NERVOUS, ANXIOUS, OR ON EDGE: SEVERAL DAYS
GAD7 TOTAL SCORE: 3
2. NOT BEING ABLE TO STOP OR CONTROL WORRYING: SEVERAL DAYS
5. BEING SO RESTLESS THAT IT IS HARD TO SIT STILL: NOT AT ALL
GAD7 TOTAL SCORE: 3
3. WORRYING TOO MUCH ABOUT DIFFERENT THINGS: SEVERAL DAYS
4. TROUBLE RELAXING: NOT AT ALL
GAD7 TOTAL SCORE: 3
7. FEELING AFRAID AS IF SOMETHING AWFUL MIGHT HAPPEN: NOT AT ALL

## 2019-10-24 ASSESSMENT — MIFFLIN-ST. JEOR: SCORE: 1911.38

## 2019-10-24 NOTE — LETTER

## 2019-10-24 NOTE — PATIENT INSTRUCTIONS
1. Stool cultures to complete     2. Colonoscopy on Monday     3. Await rest of labs     4. Recheck in 1 week

## 2019-10-25 LAB
C COLI+JEJUNI+LARI FUSA STL QL NAA+PROBE: NOT DETECTED
EC STX1 GENE STL QL NAA+PROBE: NOT DETECTED
EC STX2 GENE STL QL NAA+PROBE: NOT DETECTED
ENTERIC PATHOGEN COMMENT: NORMAL
NOROV GI+II ORF1-ORF2 JNC STL QL NAA+PR: NOT DETECTED
O+P STL MICRO: NORMAL
O+P STL MICRO: NORMAL
RVA NSP5 STL QL NAA+PROBE: NOT DETECTED
SALMONELLA SP RPOD STL QL NAA+PROBE: NOT DETECTED
SHIGELLA SP+EIEC IPAH STL QL NAA+PROBE: NOT DETECTED
SPECIMEN SOURCE: NORMAL
V CHOL+PARA RFBL+TRKH+TNAA STL QL NAA+PR: NOT DETECTED
Y ENTERO RECN STL QL NAA+PROBE: NOT DETECTED

## 2019-10-25 ASSESSMENT — ANXIETY QUESTIONNAIRES: GAD7 TOTAL SCORE: 3

## 2019-10-25 ASSESSMENT — PATIENT HEALTH QUESTIONNAIRE - PHQ9: SUM OF ALL RESPONSES TO PHQ QUESTIONS 1-9: 4

## 2019-10-25 ASSESSMENT — ASTHMA QUESTIONNAIRES: ACT_TOTALSCORE: 23

## 2019-10-25 NOTE — TELEPHONE ENCOUNTER
LM for patient to return call on behalf of SG.   Inquiring if patient is still having questions or concerns regarding below.     Of note, he is not due for a visit regarding his inhaler until April 2020 (ACT was 23 at yesterday's visit).     Faith Lino, RN, BSN

## 2019-10-25 NOTE — RESULT ENCOUNTER NOTE
Jazmín Castorena    Your results were normal. No signs of parasite infection. Iron storage (ferritin) and liver enzymes up about the same as the labs I pulled from [facility].     The results are attached for your review.       Elian Tomas PA-C

## 2019-10-27 ENCOUNTER — ANESTHESIA EVENT (OUTPATIENT)
Dept: SURGERY | Facility: CLINIC | Age: 44
End: 2019-10-27

## 2019-10-28 ENCOUNTER — TELEPHONE (OUTPATIENT)
Dept: FAMILY MEDICINE | Facility: OTHER | Age: 44
End: 2019-10-28

## 2019-10-28 ENCOUNTER — ANESTHESIA (OUTPATIENT)
Dept: SURGERY | Facility: CLINIC | Age: 44
End: 2019-10-28

## 2019-10-28 NOTE — RESULT ENCOUNTER NOTE
Jazmín Castorena    Your results were negative.     The results are attached for your review.       Elian Tomas PA-C

## 2019-10-28 NOTE — TELEPHONE ENCOUNTER
Patient needs to cancel scope for today. He says he will call back to r/s. His reasoning was being out of town.

## 2019-11-09 ENCOUNTER — HEALTH MAINTENANCE LETTER (OUTPATIENT)
Age: 44
End: 2019-11-09

## 2019-11-11 NOTE — TELEPHONE ENCOUNTER
Date of colonoscopy/EGD: 11/20  Surgeon: Dr. Greenfield  Prep:Miralax  Packet:pt had instructions   Date: 11/11/2019      Surgery Scheduler

## 2019-11-13 ENCOUNTER — TELEPHONE (OUTPATIENT)
Dept: FAMILY MEDICINE | Facility: OTHER | Age: 44
End: 2019-11-13

## 2019-11-13 NOTE — TELEPHONE ENCOUNTER
Returned call to patient. He is scheduled for colonoscopy on 11/17. He reports he is not use to going to the doctor, so he is unsure if he should keep his colonoscopy as scheduled, be seen in the clinic again, or just call like he is with an update:    Since his fall in October:   - feels very nauseated intermittently  - this has not changed since his last office visit 10/24/19 with CDL  - has been leaking pus from anus in between stools, incontinent of pus and small amount of stool  - pale/pussy stools  - no sign of blood when his stool leaks  - can feel small bumps on his anus, exterior, no pain or itch  - when he wipes stool away, occassionally sees a small streak of blood    Since OV with CDL:  - no pain at all  - passing 4-5 stools daily, formed, pale tan color (previously, was going 2-3 x daily      He would like this update sent to CDL to see what he should be doing at this point  - should he be seen in clinic or cancel his colonoscopy next week or would CD like any further tests ran?  - if we need him to return to clinic, he could come in any afternoon this week    Routed to CDL.  Cordelia Villasenor, RN, BSN

## 2019-11-13 NOTE — TELEPHONE ENCOUNTER
Reason for call:  Symptom  Reason for call:  Patient reporting a symptom    Symptom or request: Exudate and discharge, feeling a lot of flu type symptoms. Wondering if he should be worried, or not. Colonoscopy is not until next week.     Duration (how long have symptoms been present): about a week    Have you been treated for this before? Yes    Additional comments: not large discharges, does have to change undergarment at least 1x daily    Phone Number patient can be reached at:  Cell number on file:    Telephone Information:   Mobile [phone]       Best Time:  Any    Can we leave a detailed message on this number:  YES    Call taken on 11/13/2019 at 3:28 PM by Gloria Gonzalez

## 2019-11-14 NOTE — TELEPHONE ENCOUNTER
He really, really needs his colonoscopy. This was far too important to wait this long so I am very disappointed that it hasn't been done yet. There are no other tests I can order besides the colonoscopy and nothing I could do for him in clinic.     Elian Tomas PA-C  [facility]

## 2019-11-14 NOTE — TELEPHONE ENCOUNTER
Patient returned called, relayed CDL's message.  He agrees to keep his colonoscopy as scheduled for 11/27/19.  He admits, he was hesitant to have it done and wanted any excuse to cancel it, but he verbalizes understanding that he really needs this test done ASAP.    Encouraged him to follow up with either GI or PCP post-procedure as recommended by GI.   Recommended he contact GI prior to the procedure to ask what they would be recommending for follow-up, so he can plan/schedule any upcoming appts now if possible.    Recommended he call back with any questions/concerns.    Cordelia Villasenor, RN, BSN

## 2019-11-14 NOTE — TELEPHONE ENCOUNTER
Called patient to relay CDL's message, left message to call back.    Cordelia Villasenor, RN, BSN

## 2019-11-18 NOTE — H&P
[facility] History and Physical    Raffaele Guzman MRN# [id number]   Age: 44 year old YOB: 1975     Date of Admission:  (Not on file)    Home clinic: [facility]  Primary care provider: Uche Crespo          Impression and Plan:   Impression:   Blood in stool [K92.1]  Incontinence of feces, unspecified fecal incontinence type [R15.9]  No prior colonocopy      Plan:   Proceed to Colonoscopy as planned.  The procedure, risks(bleeding, perforation), benefits and alternatives were discussed and the patient agrees to proceed. Cleared for Anesthesia             Chief Complaint:   Blood in stool [K92.1]  Incontinence of feces, unspecified fecal incontinence type [R15.9]    History is obtained from the patient          History of Present Illness:   This 44 year old male who fell out of a bunk at [facility] several weeks ago.  He then developed rectal pain in the area felt abnormal.  He had a bowel movement that relieved the pain then noticed a little bit of blood.  He denies any further episodes nausea vomiting fevers chills or abdominal pain.  There is no family history of colon cancer or polyps.  He has never had a colonoscopy.  He now presents for a colonoscopy.       Past Medical History:     Past Medical History:   Diagnosis Date     Allergy to mold spores     4/16/13 RAST pos. for: DM/M/G/RW     Diagnostic skin and sensitization tests 4/16/13 RAST pos. for: DM/M/G/RW     House dust mite allergy      Hypovitaminosis D     noted 4/16/13, put on Rx     Moderate persistent asthma      Nasal polyp     noted on left side.     Seasonal allergic conjunctivitis      Seasonal allergic rhinitis             Past Surgical History:     Past Surgical History:   Procedure Laterality Date     HERNIORRHAPHY VENTRAL N/A 3/4/2015    Procedure: HERNIORRHAPHY VENTRAL;  Surgeon: Giovanni Greenfield MD;  Location:  OR            Social History:     Social History     Tobacco Use     Smoking status:  Current Some Day Smoker     Packs/day: 0.00     Types: Cigars, Cigarettes     Last attempt to quit: 2003     Years since quittin.6     Smokeless tobacco: Never Used     Tobacco comment: not everyday- can go months without, then may have a few   Substance Use Topics     Alcohol use: Yes     Comment: occ. use- per week use- varies 0-4            Family History:     Family History   Problem Relation Age of Onset     Diabetes No family hx of      Hyperlipidemia No family hx of      Hypertension No family hx of      Other Cancer No family hx of             Immunizations:     VACCINE/DOSE   Diptheria   DPT   DTAP   HBIG   Hepatitis A   Hepatitis B   HIB   Influenza   Measles   Meningococcal   MMR   Mumps   Pneumococcal   Polio   Rubella   Small Pox   TDAP   Varicella   Zoster            Allergies:     Allergies   Allergen Reactions     Aspirin Unknown     As a kid            Medications:     No current facility-administered medications for this encounter.      Current Outpatient Medications   Medication Sig     albuterol (PROAIR HFA/PROVENTIL HFA/VENTOLIN HFA) 108 (90 Base) MCG/ACT inhaler Inhale 2 puffs into the lungs every 6 hours     VITAMIN D, CHOLECALCIFEROL, PO Take by mouth daily     multivitamin, therapeutic with minerals (MULTI-VITAMIN) TABS tablet Take 1 tablet by mouth daily     Omega-3 Fatty Acids (FISH OIL) 1000 MG CPDR              Review of Systems:   The review of systems was positive for the following findings.  Tailbone pain.  The remainder of the review of systems was unremarkable.          Physical Exam:   All vitals have been reviewed  There were no vitals taken for this visit.  No intake or output data in the 24 hours ending 19 0755  SHEENT examination revealed WNL.  Examination of the chest revealed CTA  Examination of the heart revealed S1, S2 (-)m/r/g  Examination of the abdomen revealed Soft, non tender, non distended.  The neuromuscular examination was WNL          Data:   All  laboratory data reviewed  No results found for any visits on 11/20/19.     Giovanni Greenfield MD, FACS

## 2019-11-19 ENCOUNTER — ANESTHESIA EVENT (OUTPATIENT)
Dept: GASTROENTEROLOGY | Facility: CLINIC | Age: 44
End: 2019-11-19
Payer: COMMERCIAL

## 2019-11-20 ENCOUNTER — HOSPITAL ENCOUNTER (OUTPATIENT)
Facility: CLINIC | Age: 44
Discharge: HOME | End: 2019-11-20
Attending: SPECIALIST | Admitting: SPECIALIST
Payer: COMMERCIAL

## 2019-11-20 ENCOUNTER — ANESTHESIA (OUTPATIENT)
Dept: GASTROENTEROLOGY | Facility: CLINIC | Age: 44
End: 2019-11-20
Payer: COMMERCIAL

## 2019-11-20 VITALS
DIASTOLIC BLOOD PRESSURE: 91 MMHG | TEMPERATURE: 98 F | HEART RATE: 88 BPM | SYSTOLIC BLOOD PRESSURE: 122 MMHG | RESPIRATION RATE: 14 BRPM | OXYGEN SATURATION: 94 %

## 2019-11-20 LAB — COLONOSCOPY: NORMAL

## 2019-11-20 PROCEDURE — 45378 DIAGNOSTIC COLONOSCOPY: CPT | Performed by: SPECIALIST

## 2019-11-20 PROCEDURE — 25000125 ZZHC RX 250: Performed by: SPECIALIST

## 2019-11-20 PROCEDURE — 25800030 ZZH RX IP 258 OP 636: Performed by: SPECIALIST

## 2019-11-20 PROCEDURE — 25000125 ZZHC RX 250: Performed by: NURSE ANESTHETIST, CERTIFIED REGISTERED

## 2019-11-20 PROCEDURE — 25000128 H RX IP 250 OP 636: Performed by: NURSE ANESTHETIST, CERTIFIED REGISTERED

## 2019-11-20 PROCEDURE — 40000296 ZZH STATISTIC ENDO RECOVERY CLASS 1:2 FIRST HOUR: Performed by: SPECIALIST

## 2019-11-20 PROCEDURE — 37000008 ZZH ANESTHESIA TECHNICAL FEE, 1ST 30 MIN: Performed by: SPECIALIST

## 2019-11-20 RX ORDER — LIDOCAINE HYDROCHLORIDE 20 MG/ML
INJECTION, SOLUTION INFILTRATION; PERINEURAL PRN
Status: DISCONTINUED | OUTPATIENT
Start: 2019-11-20 | End: 2019-11-20

## 2019-11-20 RX ORDER — LIDOCAINE 40 MG/G
CREAM TOPICAL
Status: DISCONTINUED | OUTPATIENT
Start: 2019-11-20 | End: 2019-11-20 | Stop reason: HOSPADM

## 2019-11-20 RX ORDER — SODIUM CHLORIDE, SODIUM LACTATE, POTASSIUM CHLORIDE, CALCIUM CHLORIDE 600; 310; 30; 20 MG/100ML; MG/100ML; MG/100ML; MG/100ML
INJECTION, SOLUTION INTRAVENOUS CONTINUOUS
Status: DISCONTINUED | OUTPATIENT
Start: 2019-11-20 | End: 2019-11-20 | Stop reason: HOSPADM

## 2019-11-20 RX ORDER — PROPOFOL 10 MG/ML
INJECTION, EMULSION INTRAVENOUS CONTINUOUS PRN
Status: DISCONTINUED | OUTPATIENT
Start: 2019-11-20 | End: 2019-11-20

## 2019-11-20 RX ORDER — PROPOFOL 10 MG/ML
INJECTION, EMULSION INTRAVENOUS PRN
Status: DISCONTINUED | OUTPATIENT
Start: 2019-11-20 | End: 2019-11-20

## 2019-11-20 RX ADMIN — SODIUM CHLORIDE, POTASSIUM CHLORIDE, SODIUM LACTATE AND CALCIUM CHLORIDE: 600; 310; 30; 20 INJECTION, SOLUTION INTRAVENOUS at 12:00

## 2019-11-20 RX ADMIN — PROPOFOL 150 MCG/KG/MIN: 10 INJECTION, EMULSION INTRAVENOUS at 13:01

## 2019-11-20 RX ADMIN — LIDOCAINE HYDROCHLORIDE 1 ML: 10 INJECTION, SOLUTION EPIDURAL; INFILTRATION; INTRACAUDAL; PERINEURAL at 12:00

## 2019-11-20 RX ADMIN — PROPOFOL 100 MG: 10 INJECTION, EMULSION INTRAVENOUS at 13:01

## 2019-11-20 RX ADMIN — LIDOCAINE HYDROCHLORIDE 100 MG: 20 INJECTION, SOLUTION INFILTRATION; PERINEURAL at 13:01

## 2019-11-20 NOTE — ANESTHESIA POSTPROCEDURE EVALUATION
Patient: Raffaele Guzman    Procedure(s):  COLONOSCOPY    Diagnosis:Blood in stool [K92.1]  Incontinence of feces, unspecified fecal incontinence type [R15.9]  Diagnosis Additional Information: No value filed.    Anesthesia Type:  MAC    Note:  Anesthesia Post Evaluation    Patient location during evaluation: Phase 2 and Bedside  Patient participation: Able to fully participate in evaluation  Level of consciousness: awake and alert  Pain management: satisfactory to patient  Airway patency: patent  Cardiovascular status: stable  Respiratory status: spontaneous ventilation and room air  Hydration status: stable  PONV: none     Anesthetic complications: None          Last vitals:  Vitals:    11/20/19 1155 11/20/19 1320   BP: (!) 139/94 125/83   Pulse: 114    Resp:  14   Temp: 98  F (36.7  C)    SpO2: 99% 94%         Electronically Signed By: BEATRIZ Vivas CRNA  November 20, 2019  1:35 PM

## 2019-11-20 NOTE — ANESTHESIA CARE TRANSFER NOTE
Patient: Raffaele Guzman    Procedure(s):  COLONOSCOPY    Diagnosis: Blood in stool [K92.1]  Incontinence of feces, unspecified fecal incontinence type [R15.9]  Diagnosis Additional Information: No value filed.    Anesthesia Type:   MAC     Note:    Patient transferred to:Phase II  Handoff Report: Identifed the Patient, Identified the Reponsible Provider, Reviewed the pertinent medical history, Discussed the surgical course, Reviewed Intra-OP anesthesia mangement and issues during anesthesia, Set expectations for post-procedure period and Allowed opportunity for questions and acknowledgement of understanding      Vitals: (Last set prior to Anesthesia Care Transfer)    CRNA VITALS  11/20/2019 1246 - 11/20/2019 1333      11/20/2019             Pulse:  97    SpO2:  94 %                Electronically Signed By: BEATRIZ Vivas CRNA  November 20, 2019  1:33 PM

## 2019-11-20 NOTE — ANESTHESIA PREPROCEDURE EVALUATION
Anesthesia Pre-Procedure Evaluation    Patient: Raffaele Guzman   MRN: [id number] : 1975          Preoperative Diagnosis: Blood in stool [K92.1]  Incontinence of feces, unspecified fecal incontinence type [R15.9]    Procedure(s):  COLONOSCOPY    Past Medical History:   Diagnosis Date     Allergy to mold spores     13 RAST pos. for: DM/M/G/RW     Diagnostic skin and sensitization tests 13 RAST pos. for: DM/M/G/RW     House dust mite allergy      Hypovitaminosis D     noted 13, put on Rx     Moderate persistent asthma      Nasal polyp     noted on left side.     Seasonal allergic conjunctivitis      Seasonal allergic rhinitis      Past Surgical History:   Procedure Laterality Date     HERNIORRHAPHY VENTRAL N/A 3/4/2015    Procedure: HERNIORRHAPHY VENTRAL;  Surgeon: Giovanni Greenfield MD;  Location: [facility]       Anesthesia Evaluation     . Pt has had prior anesthetic.     No history of anesthetic complications          ROS/MED HX    ENT/Pulmonary:     (+)ALBERTO risk factors snores loudly, obese, observed stopped breathing Intermittent asthma (seasonal in spring and fall.  has not used inhaler for months) Treatment: Inhaler prn,  , . .    Neurologic:  - neg neurologic ROS     Cardiovascular:  - neg cardiovascular ROS       METS/Exercise Tolerance:     Hematologic:  - neg hematologic  ROS       Musculoskeletal:  - neg musculoskeletal ROS       GI/Hepatic:  - neg GI/hepatic ROS      (-) GERD   Renal/Genitourinary:  - ROS Renal section negative       Endo:     (+) Obesity, .      Psychiatric:     (+) psychiatric history anxiety      Infectious Disease:  - neg infectious disease ROS       Malignancy:      - no malignancy   Other:    - neg other ROS                      Physical Exam  Normal systems: cardiovascular, pulmonary and dental    Airway   Mallampati: III  TM distance: >3 FB  Neck ROM: full  Comment: Lower jaw recessed.    Dental     Cardiovascular   Rhythm and rate: regular and  "normal      Pulmonary    breath sounds clear to auscultation            Lab Results   Component Value Date    WBC 5.7 10/24/2019    HGB 15.8 10/24/2019    HCT 45.9 10/24/2019     10/24/2019     10/24/2019    POTASSIUM 4.2 10/24/2019    CHLORIDE 104 10/24/2019    CO2 28 10/24/2019    BUN 10 10/24/2019    CR 0.93 10/24/2019     (H) 10/24/2019    GERMAINE 9.1 10/24/2019    ALBUMIN 3.8 10/24/2019    PROTTOTAL 8.0 10/24/2019    ALT 83 (H) 10/24/2019    AST 41 10/24/2019    ALKPHOS 69 10/24/2019    BILITOTAL 0.6 10/24/2019    TSH 2.72 04/16/2013       Preop Vitals  BP Readings from Last 3 Encounters:   10/24/19 130/84   10/09/19 112/72   06/17/19 120/80    Pulse Readings from Last 3 Encounters:   10/24/19 98   10/09/19 87   06/17/19 68      Resp Readings from Last 3 Encounters:   10/24/19 14   06/17/19 16   10/31/18 18    SpO2 Readings from Last 3 Encounters:   10/24/19 97%   10/09/19 95%   06/17/19 96%      Temp Readings from Last 1 Encounters:   10/24/19 97  F (36.1  C) (Temporal)    Ht Readings from Last 1 Encounters:   10/24/19 1.778 m (5' 10\")      Wt Readings from Last 1 Encounters:   10/24/19 102.5 kg (226 lb)    Estimated body mass index is 32.43 kg/m  as calculated from the following:    Height as of 10/24/19: 1.778 m (5' 10\").    Weight as of 10/24/19: 102.5 kg (226 lb).       Anesthesia Plan      History & Physical Review  History and physical reviewed and following examination; no interval change.    ASA Status:  2 .    NPO Status:  > 6 hours    Plan for MAC with Intravenous and Propofol induction. Maintenance will be TIVA.  Reason for MAC:  Deep or markedly invasive procedure (G8)         Postoperative Care      Consents  Anesthetic plan, risks, benefits and alternatives discussed with:  Patient.  Use of blood products discussed: No .   .                 BEATRIZ Vivas CRNA  "

## 2019-11-20 NOTE — DISCHARGE INSTRUCTIONS
[facility]    Home Care Following Endoscopy          Activity:    You have just undergone an endoscopic procedure usually performed with conscious sedation.  Do not work or operate machinery (including a car) for at least 12 hours.      I encourage you to walk and attempt to pass this air as soon as possible.    Diet:    Return to the diet you were on before your procedure but eat lightly for the first 12-24 hours.    Drink plenty of water.    Resume any regular medications unless otherwise advised by your physician.  Please begin any new medication prescribed as a result of your procedure as directed by your physician.     If you had any biopsy or polyp removed please refrain from aspirin or aspirin products for 2 days.  If on Coumadin please restart as instructed by your physician.   Pain:    You may take Tylenol as needed for pain.  Expected Recovery:    You can expect some mild abdominal fullness and/or discomfort due to the air used to inflate your intestinal tract. It is also normal to have a mild sore throat after upper endoscopy.    Call Your Physician if You Have:    After Colonoscopy:  o Worsening persisting abdominal pain which is worse with activity.  o Fevers (>101 degrees F), chills or shakes.  o Passage of continued blood with bowel movements.   Any questions or concerns about your recovery, please call [phone] or after hours [phone] Nurse Advice Line.

## 2019-12-31 DIAGNOSIS — J45.20 MILD INTERMITTENT ASTHMA WITHOUT COMPLICATION: ICD-10-CM

## 2019-12-31 RX ORDER — ALBUTEROL SULFATE 90 UG/1
2 AEROSOL, METERED RESPIRATORY (INHALATION) EVERY 6 HOURS
Qty: 1 INHALER | Refills: 2 | Status: SHIPPED | OUTPATIENT
Start: 2019-12-31 | End: 2020-02-25

## 2019-12-31 NOTE — TELEPHONE ENCOUNTER
Pending Prescriptions:                       Disp   Refills    albuterol (PROAIR HFA/PROVENTIL HFA/MICHELE*1 Inha*2            Sig: Inhale 2 puffs into the lungs every 6 hours    Prescription approved per [facility] Refill Protocol.    Gillian Tejada, RN, BSN

## 2020-01-12 ENCOUNTER — THERAPY VISIT (OUTPATIENT)
Dept: SLEEP MEDICINE | Facility: CLINIC | Age: 45
End: 2020-01-12
Payer: COMMERCIAL

## 2020-01-12 DIAGNOSIS — G47.33 OSA (OBSTRUCTIVE SLEEP APNEA): ICD-10-CM

## 2020-01-12 PROCEDURE — 95810 POLYSOM 6/> YRS 4/> PARAM: CPT | Performed by: INTERNAL MEDICINE

## 2020-01-13 PROBLEM — G47.33 OSA (OBSTRUCTIVE SLEEP APNEA): Status: ACTIVE | Noted: 2020-01-13

## 2020-01-13 LAB — SLPCOMP: NORMAL

## 2020-01-13 NOTE — PROCEDURES
" SLEEP STUDY INTERPRETATION  DIAGNOSTIC POLYSOMNOGRAPHY REPORT      Patient: KIARA HAGEN  YOB: 1975  Study Date: 1/12/2020  MRN: [id number]  Referring Provider: Self  Ordering Provider: Georges Leyva MD    Indications for Polysomnography: The patient is a 44 y old Male who is 5' 10\" and weighs 225.0 lbs. His BMI is 32.6, [place] sleepiness scale 9.0 and neck circumference is 43.0 cm. Relevant medical history includes mild intermittent asthma, tobacco dependence, alcohol dependence, anxiety. A diagnostic polysomnogram was performed to evaluate for sleep apnea.    Polysomnogram Data: A full night polysomnogram recorded the standard physiologic parameters including EEG, EOG, EMG, ECG, nasal and oral airflow. Respiratory parameters of chest and abdominal movements were recorded with respiratory inductance plethysmography. Oxygen saturation was recorded by pulse oximetry. Hypopnea scoring rule used: 1B 4%.    Sleep Architecture: All sleep stages seen. Increased sleep onset latency resulted in reduce sleep efficiency. Arousal frequency was increased.   The total recording time of the polysomnogram was 397.0 minutes. The total sleep time was 313.0 minutes. Sleep latency was increased at 53.5 minutes without the use of a sleep aid. REM latency was 90.0 minutes. Arousal index was increased at 42.4 arousals per hour. Sleep efficiency was decreased at 78.8%. Wake after sleep onset was 19.5 minutes. The patient spent 7.8% of total sleep time in Stage N1, 54.8% in Stage N2, 21.9% in Stage N3, and 15.5% in REM. Time in REM supine was 0 minutes.    Respiration: Moderate severity ALBERTO (AHI 15.9, supine .7 and REM AHI 14.8) noted. Loud to very loud snoring noted continuously throughout the night. No significant hypoxemia noted.     Events ? The polysomnogram revealed a presence of 73 obstructive, 1 central, and 0 mixed apneas resulting in an apnea index of 14.2 events per hour. There were 9 obstructive " hypopneas and 0 central hypopneas resulting in an obstructive hypopnea index of 1.7 and central hypopnea index of 0 events per hour. The combined apnea/hypopnea index was 15.9 events per hour (central apnea/hypopnea index was 0.2 events per hour). The REM AHI was 14.8 events per hour. The supine AHI was 125.7 events per hour. The RERA index was 18.8 events per hour.  The RDI was 34.7 events per hour.    Snoring - was reported as loud while lateral and prone and very loud when supine.    Respiratory rate and pattern - was notable for normal respiratory rate and pattern.    Sustained Sleep Associated Hypoventilation - Transcutaneous carbon dioxide monitoring was not used, however significant hypoventilation was not suggested by oximetry.    Sleep Associated Hypoxemia - (Greater than 5 minutes O2 sat at or below 88%) was not present. Baseline oxygen saturation was 94.6%. Lowest oxygen saturation was 86.0%. Time spent less than or equal to 88% was 0.1 minutes. Time spent less than or equal to 89% was 0.1 minutes.    Movement Activity: No movement abnormalities noted.     Periodic Limb Activity - There were 0 PLMs during the entire study.     REM EMG Activity - Excessive transient/sustained muscle activity was not present.    Nocturnal Behavior - Abnormal sleep related behaviors were not noted during/arising out of NREM / REM sleep.     Bruxism - None apparent.    Cardiac Summary: No cardiac abnormalities noted.   The average pulse rate was 72.5 bpm. The minimum pulse rate was 58.2 bpm while the maximum pulse rate was 99.7 bpm.  Arrhythmias were not noted.      Assessment:     Moderate severity ALBERTO (AHI 15.9, supine .7 and REM AHI 14.8) noted. Loud to very loud snoring noted continuously throughout the night. No significant hypoxemia noted.    All sleep stages seen. Increased sleep onset latency resulted in reduce sleep efficiency. Arousal frequency was increased.    No movement abnormalities noted.    No cardiac  abnormalities noted.    Recommendations:    Based on the presence of moderate obstructive sleep apnea and excessive daytime sleepiness, treatment could be empirically initiated with Auto?titrating PAP therapy with a range of 5 to 15 cmH2O. Recommend clinical follow up with sleep management team.    Advice regarding the risks of drowsy driving.    Suggest optimizing sleep schedule and avoiding sleep deprivation.    Weight management (BMI > 30).    Diagnostic Codes:   Obstructive Sleep Apnea G47.33      Electronically Signed By: Georges Leyva MD (1/13/20)           Range(%) Time in range (min)   0.0 - 89.0 0.1   0.0 - 88.0 0.1         Stage Min(mm Hg) Max(mm Hg)   Wake - -   NREM(1+2+3) - -   REM - -       Range(mmHg) Time in range (min)   55.0 - 100.0 -   Excluded data <20.0 & >65.0 397.5

## 2020-02-04 ENCOUNTER — VIRTUAL VISIT (OUTPATIENT)
Dept: SLEEP MEDICINE | Facility: CLINIC | Age: 45
End: 2020-02-04
Payer: COMMERCIAL

## 2020-02-04 DIAGNOSIS — G47.33 OSA (OBSTRUCTIVE SLEEP APNEA): Primary | ICD-10-CM

## 2020-02-04 NOTE — PROGRESS NOTES
SLEEP MEDICINE CLINIC VIRTUAL VISIT NOTE   [facility] SLEEP DISORDER [facility]  Raffaele Guzman 44 year old male  : 1975  MRN: [id number]      PRIMARY CARE PROVIDER: Uche Crespo    Visit Date:   2020      REASON FOR VIRTUAL VISIT:  Review of recent overnight polysomnography and treatment recommendations.      HISTORY OF PRESENT ILLNESS:  The patient is a very pleasant 44-year-old gentleman who was initially seen in our clinic approximately 4 months ago for evaluation of snoring and excessive daytime sleepiness.  He also has mild intermittent asthma, tobacco dependence, alcohol dependence, anxiety, hyperferritinemia, and macrocytosis.  He was recommended to undergo polysomnography which was completed on 2020.  He is on the telephone today to review the results.      The patient denies any significant changes to his sleep or health in general since he was last seen in our clinic.      ASSESSMENT AND PLAN:  The patient's polysomnography showed that he has moderate ALBERTO with an apnea-hypopnea index of 15.9 events per hour.  His steve oxygen saturation was 86% with less than 1 minute spent below 88%.  Very loud snoring was noted continuously throughout the night.  His respiratory disturbance index was increased at 34.7 events per hour, supine AHI was 125.7 events per hour, non-supine AHI was 8, REM AHI was 14.8, non-REM AHI was 15.7.  All stages of sleep were noted in normal proportion with normal sleep architecture.  Arousal index was increased at 42 events per hour.  No periodic limb movements were noted.  Sleep efficiency was reduced at 79% with total sleep time of 315 minutes.  No cardiac abnormalities were noted.      Overall, the patient has moderate ALBERTO with no significant sleep-associated hypoxemia but very loud continuous snoring.  His respiratory disturbance index is significantly elevated in the severe range.  We reviewed in detail the significance of these results.  Given the  burden of daytime dysfunction, it is recommended that he consider treatment.  We reviewed various treatment options including position restriction with avoidance of supine position, CPAP, and mandibular advancement device therapy.  The patient is interested in use of CPAP.  If after a trial of CPAP no significant improvement in his daytime dysfunction is noted or he is unable to tolerate the CPAP, he will prefer to try a mandibular advancement device as an alternative.      An auto CPAP with a range of 5-15 cm of water was ordered for him.  The patient was advised to get set up and use the CPAP every time he goes to sleep, including during naps if any.  He was counseled regarding the importance of losing weight and avoiding driving if drowsy or sleepy.      The patient will return to clinic in approximately 2 months for CPAP compliance check.         I spent a total of 15 minutes with Kiara Hagen during today's virtual sleep clinic phone visit. Over 50% of this time was spent counseling the patient and/or coordinating care regarding their sleep disorder.     Georges Leyva MD   of Medicine  Pulmonary, Critical Care and Sleep Medicine  [facility]  Pager: [phone]             D: 2020   T: 2020   MT: WT      Name:     KIARA HAGEN   MRN:      [id number]        Account:      [id number]   :      1975           Visit Date:   2020      Document: [id number]

## 2020-02-24 DIAGNOSIS — J45.20 MILD INTERMITTENT ASTHMA WITHOUT COMPLICATION: ICD-10-CM

## 2020-02-25 ENCOUNTER — DOCUMENTATION ONLY (OUTPATIENT)
Dept: SLEEP MEDICINE | Facility: CLINIC | Age: 45
End: 2020-02-25
Payer: COMMERCIAL

## 2020-02-25 RX ORDER — ALBUTEROL SULFATE 90 UG/1
AEROSOL, METERED RESPIRATORY (INHALATION)
Qty: 3 INHALER | Refills: 1 | Status: SHIPPED | OUTPATIENT
Start: 2020-02-25 | End: 2020-05-28

## 2020-02-25 NOTE — PROGRESS NOTES
Patient was offered choice of vendor and chose [facility].  Patient Raffaele Guzman was set up at [place] on February 25, 2020. Patient received a Resmed AirSense 10 Auto. Pressures were set at 5-15 cm H2O.   Patient s ramp is 5 cm H2O for Off and FLEX/EPR is EPR.  Patient received a Resmed Mask name: AIRFIT N20  Nasal mask size Small, heated tubing and heated humidifier.  Patient does not need to meet compliance. Patient has a follow up on TBD with Dr. Leyva.    Lisette Miles

## 2020-02-25 NOTE — TELEPHONE ENCOUNTER
Pending Prescriptions:                       Disp   Refills    albuterol (PROAIR HFA/PROVENTIL HFA/MICHELE*3 Inha*1            Sig: INHALE 2 PUFFS BY MOUTH EVERY 6 HOURS AS NEEDED           FOR SHORTNESS OF BREATH OR WHEEZING    Prescription approved per [facility] Refill Protocol.    Gillian Tejada, MSN, RN

## 2020-02-28 ENCOUNTER — DOCUMENTATION ONLY (OUTPATIENT)
Dept: SLEEP MEDICINE | Facility: CLINIC | Age: 45
End: 2020-02-28
Payer: COMMERCIAL

## 2020-02-28 NOTE — PROGRESS NOTES
3 DAY STM VISIT    Diagnostic AHI: 15.9  PSG    Patient contacted for 3 day STM visit.    Confirmed with patient at time of call- N/A Patient is still interested in STM service     Message left for patient to return call    Replacement device: No  STM ordered by provider: Yes     Device type: Auto-CPAP  PAP settings from order::  CPAP min 5 cm  H20       CPAP max 15 cm  H20  Mask type:    Nasal Mask     Device settings from machine  Assessment: Nightly usage, most nights under four hours.  Action plan: Patient to have 14 day STM visit. Patient has a follow up visit scheduled:   no.     Total time spent on accessing and  interpreting remote patient PAP therapy data  10 minutes  Total time spent counseling, coaching  and reviewing PAP therapy data with patient  1 minutes  [id number] no

## 2020-03-05 NOTE — PROGRESS NOTES
Patient called back and and stated he feels like he is not getting enough pressure. Patient has been fighting a cold the last few days. He will keep an eye on his pressure and call me Monday.

## 2020-03-11 ENCOUNTER — DOCUMENTATION ONLY (OUTPATIENT)
Dept: SLEEP MEDICINE | Facility: CLINIC | Age: 45
End: 2020-03-11
Payer: COMMERCIAL

## 2020-03-11 NOTE — PROGRESS NOTES
14  DAY STM VISIT    Diagnostic AHI: 15.9  PSG    Subjective measures:   Patient feels like he is chocking when his pressure is lower. Turned patients ramp off.     Assessment: Pt not meeting objective benchmarks for compliance  Patient failing following subjective benchmarks: pressure issues. Patient will leave his machine running running when he uses the bathroom.     Action plan: pt to have 30 day STM visit.      Device type: Auto-CPAP    PAP settings: CPAP min 5.0 cm  H20       CPAP max 15.0 cm  H20        95th% pressure 12.9 cm  H20     Mask type:  Nasal Mask    Objective measures: 14 day rolling measures      Compliance  20 %      Leak  11.04 lpm  last  upload      AHI 1.24   last  upload      Average number of minutes 133      Objective measure goal  Compliance   Goal >70%  Leak   Goal < 24 lpm  AHI  Goal < 5  Usage  Goal >240        Total time spent on accessing and interpreting remote patient PAP therapy data  10 minutes    Total time spent counseling, coaching  and reviewing PAP therapy data with patient  1 minutes    [id number]  no  [id number]  no (3 day STM)

## 2020-03-27 ENCOUNTER — DOCUMENTATION ONLY (OUTPATIENT)
Dept: SLEEP MEDICINE | Facility: CLINIC | Age: 45
End: 2020-03-27

## 2020-03-27 NOTE — PROGRESS NOTES
30 DAY [facility] VISIT    Diagnostic AHI: 15.9  PSG    Subjective measures: Patient states things  Are overall going better, which appears to be accurate based on downloads. Pt does have issues with too high of pressures when waking in the middle of the night as if ramp does not restart after he restarts machine.    Assessment: Pt meeting objective benchmarks.  Patient failing following subjective benchmarks: pressure issues  Action plan: Patient has not scheduled a follow up visit with Dr. Leyva.  Device type: Auto-CPAP  PAP settings: CPAP min 5.0 cm  H20     CPAP max 15.0 cm  H20    95th% pressure 12.1 cm  H20   Mask type:  Nasal Mask  Objective measures: 14 day rolling measures      Compliance  85 %      Leak  8.16 lpm  last  upload      AHI 1.29   last  upload      Average number of minutes 390      Objective measure goal  Compliance   Goal >70%  Leak   Goal < 24 lpm  AHI  Goal < 5  Usage  Goal >240        Total time spent on accessing and interpreting remote patient PAP therapy data  15 minutes    Total time spent counseling, coaching  and reviewing PAP therapy data with patient  5 minutes     [id number] no this call  [id number] no  at 3 or 14 day [facility]

## 2020-04-27 ENCOUNTER — DOCUMENTATION ONLY (OUTPATIENT)
Dept: SLEEP MEDICINE | Facility: CLINIC | Age: 45
End: 2020-04-27
Payer: COMMERCIAL

## 2020-04-27 DIAGNOSIS — G47.33 OSA (OBSTRUCTIVE SLEEP APNEA): Primary | ICD-10-CM

## 2020-04-27 NOTE — PROGRESS NOTES
Patient called and his pressure is to high waking him up.  Patient doesn't use the machine at his house only high girlfriends house. Will put in a pressure change. Pressure wants hi pressure reduced. Will send order to his Provider.

## 2020-05-26 DIAGNOSIS — J45.20 MILD INTERMITTENT ASTHMA WITHOUT COMPLICATION: ICD-10-CM

## 2020-05-26 RX ORDER — ALBUTEROL SULFATE 90 UG/1
AEROSOL, METERED RESPIRATORY (INHALATION)
Qty: 8.5 G | Status: CANCELLED | OUTPATIENT
Start: 2020-05-26

## 2020-05-27 NOTE — TELEPHONE ENCOUNTER
Pending Prescriptions:                       Disp   Refills    albuterol (PROAIR HFA/PROVENTIL HFA/VENTOL*8.5 g           Sig: INHALE 2 PUFFS INTO THE LUNGS EVERY 6 HOURS      Support Staff:   Please call patient to schedule a virtual visit. (Video, Phone or E-visit) asthma follow up  Then ask if patient will need a refill of the above medication before the visit.   Please reflag for RN and route to pool to give a 30 day josesito to get them to their visit or to remove the medication and to close the encounter.    Thank you!    Anil Brannon, RN, BSN

## 2020-05-28 RX ORDER — ALBUTEROL SULFATE 90 UG/1
2 POWDER, METERED RESPIRATORY (INHALATION) EVERY 6 HOURS PRN
Qty: 1 INHALER | Refills: 0 | Status: SHIPPED | OUTPATIENT
Start: 2020-05-28 | End: 2020-10-14

## 2020-05-28 NOTE — TELEPHONE ENCOUNTER
LM- providers requiring telephone/virtual visit. One refill given. Advised to call and schedule follow-up. BEATRIZ Laryr CNP

## 2020-05-28 NOTE — TELEPHONE ENCOUNTER
Spoke to patient, he thanks  for sending an albuterol inhaler refill. He is still upset with needing to schedule a follow up visit and requests to speak to clinic administrator before doing so regarding services spanning 5 years or so.    He feels he needs to be seen in person for his next follow up visit. I explained rationale for video visit to begin with d/t the pandemic, which will result in arranging for pt to come to clinic for a brief physical exam or tests without an additional charge if that is determined necessary by the provider. He verbalizes understanding. Gave him the option of waiting until we are scheduling back in the clinic setting, roughly 7/2020, if that is the only follow up visit he is comfortable with. He verbalizes understanding, but still wishes to relay the above issues with admin prior to scheduling.    Notified admin.  Cordelia Villasenor, BSN, RN, PHN

## 2020-05-28 NOTE — TELEPHONE ENCOUNTER
Patient declined to do a video or phone  Visit.    He states cost way to money for what you get out of it.  He states he just got a google sheet printed out.

## 2020-06-10 ENCOUNTER — TELEPHONE (OUTPATIENT)
Dept: FAMILY MEDICINE | Facility: OTHER | Age: 45
End: 2020-06-10

## 2020-06-10 DIAGNOSIS — J45.20 MILD INTERMITTENT ASTHMA WITHOUT COMPLICATION: Primary | ICD-10-CM

## 2020-06-10 RX ORDER — ALBUTEROL SULFATE 90 UG/1
2 AEROSOL, METERED RESPIRATORY (INHALATION) EVERY 6 HOURS PRN
Qty: 1 INHALER | Refills: 0 | Status: SHIPPED | OUTPATIENT
Start: 2020-06-10 | End: 2020-10-14

## 2020-06-10 NOTE — TELEPHONE ENCOUNTER
Called and spoke with patient regarding message below.  Patient verbalized understanding.  Alejandra Garner CMA ([facility])

## 2020-06-10 NOTE — TELEPHONE ENCOUNTER
"Spoke with patient on phone regarding conversation with Ani Villasenor RN on 5/26/2020. Patient was recently prescribed Pro-Air and is not happy with the cost. He would like an alternative, \"cheaper\", albuterol inhaler sent to [facility] in [place]. His preference is that this is only prescribed by Jorge Crespo. He reports that he has had a cheaper inhaler ordered in the past, and has now been paying $50/month which he is unhappy with. Advised that he should call to his insurance company to determine coverage- but in the mean time I would send a note to Jorge Crespo requesting for a new Rx. Patient is happy with this plan and will call back if any other further questions or concerns.     Miryam Austin RN on 6/10/2020 at 1:44 PM    "

## 2020-06-10 NOTE — TELEPHONE ENCOUNTER
I have sent over a new prescription but cannot guarantee there is a cheaper albuterol inhaler under his insurance. He can speak with the pharmacist about this when he picks it up but I specifically wrote a note to dispense the cheapest one. He is due for an in person visit so please help schedule sometime this summer.    Uche Crespo PA-C

## 2020-08-13 NOTE — MR AVS SNAPSHOT
After Visit Summary   10/31/2018    Raffaele Guzman    MRN: [id number]           Patient Information     Date Of Birth          1975        Visit Information        Provider Department      10/31/2018 4:45 PM Uche Crespo PA-C [facility]        Today's Diagnoses     Tick bite, initial encounter    -  1    Cellulitis of left upper extremity        Mild intermittent asthma without complication          Care Instructions    Symptoms are consistent with cellulitis which is a local infection.  Will prescribe a 10 day course of doxycyline which also treats Lyme disease.  Wash the area with soapy water 1-2 times daily.   If the redness is spreading or you have worsening pain or fevers, you should be seen right away.    Continue with albuterol inhaler as needed.     Follow up annually.             Follow-ups after your visit        Follow-up notes from your care team     Return in about 1 year (around 10/31/2019) for Routine Visit, Physical Exam.      Who to contact     If you have questions or need follow up information about today's clinic visit or your schedule please contact [facility] directly at [phone].  Normal or non-critical lab and imaging results will be communicated to you by [employer], letter or phone within 4 business days after the clinic has received the results. If you do not hear from us within 7 days, please contact the clinic through [employer] or phone. If you have a critical or abnormal lab result, we will notify you by phone as soon as possible.  Submit refill requests through [employer] or call your pharmacy and they will forward the refill request to us. Please allow 3 business days for your refill to be completed.          Additional Information About Your Visit        MyChart Information     [employer] gives you secure access to your electronic health record. If you see a primary care provider, you can also send messages to your care team and  Psychiatric Progress Note    Patient: Ericka Leary MRN: [id number]  SSN: [id number]    YOB: 1981  Age: 37 y.o. Sex: female      Admit Date: 8/8/2020    LOS: 5 days     Subjective:     Ericka Leary  reports feeling restless and moods are fair. Notes pacing and has a migraine. Denies SI/HI/AH/VH. No aggression or violence. Appropriately interactive and aware. Tolerating medications well. Eating fairly and sleeping alright.    8/11 - Ericka Leary reports seeing color blots with Geodon and better tolerated Risperdal and haldol. Moods are fair. Denies SI/HI/AH/VH. No aggression or violence. Appropriately interactive and aware. Tolerating medications well. Eating and sleeping fairly. Notes trouble focusing her thoughts. Notes dryness with Zyprexa, took Adderall and Vyvanse historically. 8/12 - Ericka Leary reports feeling strange with zyprexa and discussed her use of vyvanse and adderall for focus and concentration. Moods are fair. Denies SI/HI/AH/VH. No aggression or violence. Appropriately interactive and aware. Tolerating medications well. Eating ok and sleeping poorly. Complaining of constipation but prefers prune juice or metamucil treatments. 8/13 - Ericka Leary reports feeling better today without the groggy sensation from medications, but continued feel of being far off and moods are good. Denies SI/HI/AH/VH. No aggression or violence. Appropriately interactive and aware. Tolerating medications well. Eating and sleeping fairly.     Objective:     Vitals:    08/11/20 1940 08/12/20 0742 08/12/20 2009 08/13/20 0738   BP: 134/82 111/79 110/69 (!) 131/95   Pulse: 88 85 75 76   Resp: 18 18 18 18   Temp: 98.4 °F (36.9 °C) 98 °F (36.7 °C) 97.8 °F (36.6 °C) 98.2 °F (36.8 °C)   SpO2: 98% 97% 100% 100%   Weight:       Height:            Mental Status Exam:   Sensorium  oriented to time, place and person   Relations cooperative   Eye Contact appropriate   Appearance:  age appropriate, sauntering   Speech:  normal volume and non-pressured   Thought Process: goal directed   Thought Content free of delusions and free of hallucinations   Suicidal ideations none   Mood:  anxious   Affect:  constricted   Memory   adequate   Concentration:  adequate   Insight:  fair   Judgment:  impaired due to condition       MEDICATIONS:  Current Facility-Administered Medications   Medication Dose Route Frequency    melatonin tablet 3 mg  3 mg Oral QHS    haloperidoL (HALDOL) tablet 5 mg  5 mg Oral BID    benztropine (COGENTIN) tablet 1 mg  1 mg Oral BID    ibuprofen (MOTRIN) tablet 800 mg  800 mg Oral TID PRN    OLANZapine (ZyPREXA) tablet 5 mg  5 mg Oral Q6H PRN    haloperidol lactate (HALDOL) injection 5 mg  5 mg IntraMUSCular Q6H PRN    benztropine (COGENTIN) tablet 1 mg  1 mg Oral BID PRN    diphenhydrAMINE (BENADRYL) injection 50 mg  50 mg IntraMUSCular BID PRN    hydrOXYzine HCL (ATARAX) tablet 50 mg  50 mg Oral TID PRN    LORazepam (ATIVAN) injection 1 mg  1 mg IntraMUSCular Q4H PRN    traZODone (DESYREL) tablet 50 mg  50 mg Oral QHS PRN    acetaminophen (TYLENOL) tablet 650 mg  650 mg Oral Q4H PRN    magnesium hydroxide (MILK OF MAGNESIA) 400 mg/5 mL oral suspension 30 mL  30 mL Oral DAILY PRN    nicotine (NICODERM CQ) 21 mg/24 hr patch 1 Patch  1 Patch TransDERmal DAILY      DISCUSSION:   the risks and benefits of the proposed medication  patient given opportunity to ask questions    Lab/Data Review: All lab results for the last 24 hours reviewed. No results found for this or any previous visit (from the past 24 hour(s)).       Assessment:     Principal Problem:    Schizoaffective disorder ([facility] [address] (8/9/2020)        Plan:     Continue current care  Medication modification as needed  Continue Haldol and cogentin  Continue Prune juice with meals  Continue Melatonin for sleep  Discontinue Nicotine patch  Disposition planning with social work    Signed By: Kerry Fernandes MD make appointments. If you have questions, please call your primary care clinic.  If you do not have a primary care provider, please call [phone] and they will assist you.        Care EveryWhere ID     This is your Care EveryWhere ID. This could be used by other organizations to access your [facility] medical records  [id number]        Your Vitals Were     Pulse Temperature Respirations Pulse Oximetry          84 98.4  F (36.9  C) (Oral) 18 96%         Blood Pressure from Last 3 Encounters:   10/31/18 (!) 146/94   03/21/18 118/70   09/07/17 147/83    Weight from Last 3 Encounters:   03/21/18 206 lb 3.2 oz (93.5 kg)   12/18/17 208 lb (94.3 kg)   09/07/17 212 lb (96.2 kg)              Today, you had the following     No orders found for display         Today's Medication Changes          These changes are accurate as of 10/31/18  4:55 PM.  If you have any questions, ask your nurse or doctor.               Start taking these medicines.        Dose/Directions    doxycycline 100 MG capsule   Commonly known as:  VIBRAMYCIN   Used for:  Tick bite, initial encounter   Started by:  Uche Crespo PA-C        Dose:  100 mg   Take 1 capsule (100 mg) by mouth 2 times daily   Quantity:  20 capsule   Refills:  0            Where to get your medicines      These medications were sent to [facility] [address] [place] [address] [facility] [address] [facility] OF  & MAIN  [address] [facility], [facility] [address]     Phone:  [phone]     doxycycline 100 MG capsule                Primary Care Provider Office Phone # Fax #    Uche Crespo PA-C [phone]       [address] [facility] [address]  [facility] [address]        Equal Access to Services     GUERA ANAYA AH: Ken Batista, wajanetda luqadaha, qaybta kaalmada kathleenyada, eliel crowe. So [facility] [place] [facility] [phone].    ATENCIÓN: Si habla español, tiene a beavers disposición servicios gratuitos de asistencia lingüística.  August 13, 2020 Scar rodriguez [phone].    We comply with applicable federal civil rights laws and [place] laws. We do not discriminate on the basis of race, color, national origin, age, disability, sex, sexual orientation, or gender identity.            Thank you!     Thank you for choosing [facility]  for your care. Our goal is always to provide you with excellent care. Hearing back from our patients is one way we can continue to improve our services. Please take a few minutes to complete the written survey that you may receive in the mail after your visit with us. Thank you!             Your Updated Medication List - Protect others around you: Learn how to safely use, store and throw away your medicines at www.disposemymeds.org.          This list is accurate as of 10/31/18  4:55 PM.  Always use your most recent med list.                   Brand Name Dispense Instructions for use Diagnosis    doxycycline 100 MG capsule    VIBRAMYCIN    20 capsule    Take 1 capsule (100 mg) by mouth 2 times daily    Tick bite, initial encounter       Fish Oil 1000 MG Cpdr           Multi-vitamin Tabs tablet      Take 1 tablet by mouth daily        * VENTOLIN  (90 Base) MCG/ACT inhaler   Generic drug:  albuterol     18 g    INHALE 2 PUFFS INTO THE LUNGS EVERY 6 HOURS AS NEEDED FOR SHORTNESS OF BREATH/DYSPNEA OR WHEEZING    Moderate persistent asthma without complication       * albuterol 108 (90 Base) MCG/ACT inhaler    PROAIR HFA    8.5 g    INHALE 2 PUFFS INTO THE LUNGS EVERY 6 HOURS AS NEEDED FOR SHORTNESS OF BREATH, DIFFICULT BREATHING, OR WHEEZING    Moderate persistent asthma without complication       VITAMIN D (CHOLECALCIFEROL) PO      Take by mouth daily        * Notice:  This list has 2 medication(s) that are the same as other medications prescribed for you. Read the directions carefully, and ask your doctor or other care provider to review them with you.

## 2020-10-12 DIAGNOSIS — J45.20 MILD INTERMITTENT ASTHMA WITHOUT COMPLICATION: ICD-10-CM

## 2020-10-12 RX ORDER — ALBUTEROL SULFATE 90 UG/1
2 AEROSOL, METERED RESPIRATORY (INHALATION) EVERY 6 HOURS PRN
Qty: 1 INHALER | Refills: 0 | Status: CANCELLED | OUTPATIENT
Start: 2020-10-12

## 2020-10-13 NOTE — TELEPHONE ENCOUNTER
Pending Prescriptions:                       Disp   Refills    albuterol (PROAIR HFA/PROVENTIL HFA/VENTOL*1 Inha*0        Sig: Inhale 2 puffs into the lungs every 6 hours as needed           for shortness of breath / dyspnea or wheezing      Routing refill request to provider for review/approval because:  Patient needs to be seen because:  Due for med check, please advise on josesito  ACT Total Scores 10/31/2018 6/17/2019 10/24/2019   ACT TOTAL SCORE - - -   ASTHMA ER VISITS - - -   ASTHMA HOSPITALIZATIONS - - -   ACT TOTAL SCORE (Goal Greater than or Equal to 20) 23 20 23   In the past 12 months, how many times did you visit the emergency room for your asthma without being admitted to the hospital? 0 0 0   In the past 12 months, how many times were you hospitalized overnight because of your asthma? 0 0 0         Lisa Greene RN

## 2020-10-13 NOTE — TELEPHONE ENCOUNTER
I have not seen patient in 2 years and has not been seen in clinic for 1 year so needs clinic follow-up prior to refills.    Uche Crespo PA-C

## 2020-10-13 NOTE — TELEPHONE ENCOUNTER
Informed and scheduled for tomorrow.  He is wondering if he could get refill today or asap before appt.  [phone]

## 2020-10-14 ENCOUNTER — OFFICE VISIT (OUTPATIENT)
Dept: FAMILY MEDICINE | Facility: OTHER | Age: 45
End: 2020-10-14
Payer: COMMERCIAL

## 2020-10-14 VITALS
TEMPERATURE: 98.8 F | HEART RATE: 84 BPM | OXYGEN SATURATION: 97 % | SYSTOLIC BLOOD PRESSURE: 118 MMHG | BODY MASS INDEX: 31.09 KG/M2 | WEIGHT: 217.2 LBS | HEIGHT: 70 IN | DIASTOLIC BLOOD PRESSURE: 70 MMHG | RESPIRATION RATE: 12 BRPM

## 2020-10-14 DIAGNOSIS — Z28.21 PNEUMOCOCCAL VACCINATION DECLINED BY PATIENT: ICD-10-CM

## 2020-10-14 DIAGNOSIS — J45.20 MILD INTERMITTENT ASTHMA WITHOUT COMPLICATION: ICD-10-CM

## 2020-10-14 DIAGNOSIS — E66.09 CLASS 1 OBESITY DUE TO EXCESS CALORIES WITHOUT SERIOUS COMORBIDITY WITH BODY MASS INDEX (BMI) OF 31.0 TO 31.9 IN ADULT: ICD-10-CM

## 2020-10-14 DIAGNOSIS — E66.811 CLASS 1 OBESITY DUE TO EXCESS CALORIES WITHOUT SERIOUS COMORBIDITY WITH BODY MASS INDEX (BMI) OF 31.0 TO 31.9 IN ADULT: ICD-10-CM

## 2020-10-14 DIAGNOSIS — Z28.21 INFLUENZA VACCINATION DECLINED BY PATIENT: ICD-10-CM

## 2020-10-14 DIAGNOSIS — J45.40 MODERATE PERSISTENT ASTHMA WITHOUT COMPLICATION: Primary | ICD-10-CM

## 2020-10-14 PROCEDURE — 99213 OFFICE O/P EST LOW 20 MIN: CPT | Performed by: PHYSICIAN ASSISTANT

## 2020-10-14 RX ORDER — ALBUTEROL SULFATE 90 UG/1
2 AEROSOL, METERED RESPIRATORY (INHALATION) EVERY 6 HOURS PRN
Qty: 1 INHALER | Refills: 3 | Status: SHIPPED | OUTPATIENT
Start: 2020-10-14 | End: 2021-06-07

## 2020-10-14 RX ORDER — ALBUTEROL SULFATE 90 UG/1
2 AEROSOL, METERED RESPIRATORY (INHALATION) EVERY 6 HOURS PRN
Qty: 1 INHALER | Refills: 0 | Status: SHIPPED | OUTPATIENT
Start: 2020-10-14 | End: 2020-10-14

## 2020-10-14 ASSESSMENT — MIFFLIN-ST. JEOR: SCORE: 1866.46

## 2020-10-14 ASSESSMENT — PAIN SCALES - GENERAL: PAINLEVEL: NO PAIN (0)

## 2020-10-14 NOTE — LETTER
My Asthma Action Plan    Name: Raffaele Guzman   YOB: 1975  Date: 10/14/2020   My doctor: Yeny Guerra PA-C   My clinic: [facility]        My Control Medicine: None  My Rescue Medicine: Albuterol (Proair/Ventolin/Proventil HFA) 2-4 puffs EVERY 4 HOURS as needed. Use a spacer if recommended by your provider.   My Asthma Severity:   Moderate Persistent  Know your asthma triggers:   upper respiratory infections  allergy             GREEN ZONE   Good Control    I feel good    No cough or wheeze    Can work, sleep and play without asthma symptoms       Take your asthma control medicine every day.     1. If exercise triggers your asthma, take your rescue medication    15 minutes before exercise or sports, and    During exercise if you have asthma symptoms  2. Spacer to use with inhaler: If you have a spacer, make sure to use it with your inhaler             YELLOW ZONE Getting Worse  I have ANY of these:    I do not feel good    Cough or wheeze    Chest feels tight    Wake up at night   1. Keep taking your Green Zone medications  2. Start taking your rescue medicine:    every 20 minutes for up to 1 hour. Then every 4 hours for 24-48 hours.  3. If you stay in the Yellow Zone for more than 12-24 hours, contact your doctor.  4. If you do not return to the Green Zone in 12-24 hours or you get worse, start taking your oral steroid medicine if prescribed by your provider.           RED ZONE Medical Alert - Get Help  I have ANY of these:    I feel awful    Medicine is not helping    Breathing getting harder    Trouble walking or talking    Nose opens wide to breathe       1. Take your rescue medicine NOW  2. If your provider has prescribed an oral steroid medicine, start taking it NOW  3. Call your doctor NOW  4. If you are still in the Red Zone after 20 minutes and you have not reached your doctor:    Take your rescue medicine again and    Call 911 or go to the emergency room right  away    See your regular doctor within 2 weeks of an Emergency Room or Urgent Care visit for follow-up treatment.          Annual Reminders:  Meet with Asthma Educator,  Flu Shot in the Fall, consider Pneumonia Vaccination for patients with asthma (aged 19 and older).    Pharmacy:    [id number] - [place], [place] - [address] AT [facility] [address] [facility] [address]  [id number] [place], [place] - [address] AT [place] [address] & [facility] [address]    Electronically signed by Yeny Guerra PA-C   Date: 10/14/20                      Asthma Triggers  How To Control Things That Make Your Asthma Worse    Triggers are things that make your asthma worse.  Look at the list below to help you find your triggers and what you can do about them.  You can help prevent asthma flare-ups by staying away from your triggers.      Trigger                                                          What you can do   Cigarette Smoke  Tobacco smoke can make asthma worse. Do not allow smoking in your home, car or around you.  Be sure no one smokes at a child s day care or school.  If you smoke, ask your health care provider for ways to help you quit.  Ask family members to quit too.  Ask your health care provider for a referral to Quit Plan to help you quit smoking, or call 6-740-699-PLAN.     Colds, Flu, Bronchitis  These are common triggers of asthma. Wash your hands often.  Don t touch your eyes, nose or mouth.  Get a flu shot every year.     Dust Mites  These are tiny bugs that live in cloth or carpet. They are too small to see. Wash sheets and blankets in hot water every week.   Encase pillows and mattress in dust mite proof covers.  Avoid having carpet if you can. If you have carpet, vacuum weekly.   Use a dust mask and HEPA vacuum.   Pollen and Outdoor Mold  Some people are allergic to trees, grass, or weed pollen, or molds. Try to keep your windows closed.  Limit time out doors when pollen count is high.    Ask you health care provider about taking medicine during allergy season.     Animal Dander  Some people are allergic to skin flakes, urine or saliva from pets with fur or feathers. Keep pets with fur or feathers out of your home.    If you can t keep the pet outdoors, then keep the pet out of your bedroom.  Keep the bedroom door closed.  Keep pets off cloth furniture and away from stuffed toys.     Mice, Rats, and Cockroaches   Some people are allergic to the waste from these pests.   Cover food and garbage.  Clean up spills and food crumbs.  Store grease in the refrigerator.   Keep food out of the bedroom.   Indoor Mold  This can be a trigger if your home has high moisture. Fix leaking faucets, pipes, or other sources of water.   Clean moldy surfaces.  Dehumidify basement if it is damp and smelly.   Smoke, Strong Odors, and Sprays  These can reduce air quality. Stay away from strong odors and sprays, such as perfume, powder, hair spray, paints, smoke incense, paint, cleaning products, candles and new carpet.   Exercise or Sports  Some people with asthma have this trigger. Be active!  Ask your doctor about taking medicine before sports or exercise to prevent symptoms.    Warm up for 5-10 minutes before and after sports or exercise.     Other Triggers of Asthma  Cold air:  Cover your nose and mouth with a scarf.  Sometimes laughing or crying can be a trigger.  Some medicines and food can trigger asthma.

## 2020-10-14 NOTE — PROGRESS NOTES
"Subjective     Raffaele Guzman is a 45 year old male who presents to clinic today for the following health issues:    History of Present Illness     Asthma:  He presents for follow up of asthma.  He has some cough, some wheezing, and no shortness of breath. He is using a relief medication a few times a week. He does not have a controller medication. Patient is aware of the following triggers: same as previous visit. The patient has not had a visit to the Emergency Room, Urgent Care or Hospital due to asthma since the last clinic visit.     He eats 0-1 servings of fruits and vegetables daily.He consumes 2 sweetened beverage(s) daily.He exercises with enough effort to increase his heart rate 9 or less minutes per day.  He exercises with enough effort to increase his heart rate 3 or less days per week.   He is taking medications regularly.             - Allergies cause worsening Asthma.    - Doing well with albuterol PRN.  Has issues with the type of inhaler sent, doesn't do well with the Respiclick.   - He declines influenza and pneumococcal vaccinations.   - He has been working to be more active and eat healthier.     Review of Systems   Constitutional, HEENT, cardiovascular, pulmonary, gi and gu systems are negative, except as otherwise noted.      Objective    /70   Pulse 84   Temp 98.8  F (37.1  C)   Resp 12   Ht 1.778 m (5' 10\")   Wt 98.5 kg (217 lb 3.2 oz)   SpO2 97%   BMI 31.16 kg/m    Body mass index is 31.16 kg/m .  Physical Exam   GENERAL: healthy, alert and no distress  RESP: lungs clear to auscultation - no rales, rhonchi or wheezes  CV: regular rate and rhythm, normal S1 S2, no S3 or S4, no murmur, click or rub, no peripheral edema and peripheral pulses strong  MS: no gross musculoskeletal defects noted, no edema  PSYCH: mentation appears normal, affect normal/bright    No results found for this or any previous visit (from the past 24 hour(s)).        Assessment & Plan     Raffaele was seen " "today for asthma.    Diagnoses and all orders for this visit:    Moderate persistent asthma without complication    Mild intermittent asthma without complication  -     Discontinue: albuterol (PROAIR HFA/PROVENTIL HFA/VENTOLIN HFA) 108 (90 Base) MCG/ACT inhaler; Inhale 2 puffs into the lungs every 6 hours as needed for shortness of breath / dyspnea or wheezing  -     albuterol (PROAIR HFA/PROVENTIL HFA/VENTOLIN HFA) 108 (90 Base) MCG/ACT inhaler; Inhale 2 puffs into the lungs every 6 hours as needed for shortness of breath / dyspnea or wheezing    Class 1 obesity due to excess calories without serious comorbidity with body mass index (BMI) of 31.0 to 31.9 in adult    Pneumococcal vaccination declined by patient    Influenza vaccination declined by patient         BMI:   Estimated body mass index is 31.16 kg/m  as calculated from the following:    Height as of this encounter: 1.778 m (5' 10\").    Weight as of this encounter: 98.5 kg (217 lb 3.2 oz).   Weight management plan: Discussed healthy diet and exercise guidelines         Asthma:  - Well controlled.   - Refilled albuterol to use PRN.   - Discussed that it appears his insurance as the Respiclick as the level 1 medication but sent in the generic albuterol.  He can check with pharmacy about cost.   - Declined vaccinations, he is aware of risk with not vaccinating    Return in about 6 months (around 4/14/2021) for Medication Re-check.    Options for treatment and follow-up care were reviewed with the patient and/or guardian. Patient and/or guardian engaged in the decision making process and verbalized understanding of the options discussed and agreed with the final plan.    Yeny Guerra PA-C  [facility]" PHYSICAL EXAMINATION:  Vital Signs Last 24 Hrs  T(C): 36.8 (16 May 2019 09:30), Max: 36.8 (16 May 2019 09:30)  T(F): 98.3 (16 May 2019 09:30), Max: 98.3 (16 May 2019 09:30)  HR: 70 (16 May 2019 11:30) (70 - 78)  BP: 138/64 (16 May 2019 11:30) (120/64 - 150/72)  BP(mean): --  RR: 17 (16 May 2019 11:30) (17 - 19)  SpO2: 98% (16 May 2019 11:30) (98% - 100%)  CAPILLARY BLOOD GLUCOSE      POCT Blood Glucose.: 469 mg/dL (16 May 2019 11:27)      GENERAL: NAD, well-groomed, well-developed  HEAD:  atraumatic, normocephalic  EYES: sclera anicteric  ENMT: mucous membranes moist  NECK: supple, No JVD  CHEST/LUNG: clear to auscultation bilaterally; no rales, rhonchi, or wheezing b/l  HEART: normal S1, S2  ABDOMEN: BS+, soft, ND, NT   EXTREMITIES:  2+ edema b/l LEs L>R  NEURO: awake, alert, interactive; moves all extremities  SKIN: no rashes or lesions

## 2020-10-15 ASSESSMENT — ASTHMA QUESTIONNAIRES: ACT_TOTALSCORE: 23

## 2020-12-06 ENCOUNTER — HEALTH MAINTENANCE LETTER (OUTPATIENT)
Age: 45
End: 2020-12-06

## 2021-06-07 DIAGNOSIS — J45.20 MILD INTERMITTENT ASTHMA WITHOUT COMPLICATION: ICD-10-CM

## 2021-06-07 RX ORDER — ALBUTEROL SULFATE 90 UG/1
AEROSOL, METERED RESPIRATORY (INHALATION)
Qty: 18 G | Refills: 0 | Status: SHIPPED | OUTPATIENT
Start: 2021-06-07 | End: 2021-07-02

## 2021-06-07 NOTE — TELEPHONE ENCOUNTER
Signed Prescriptions:                        Disp   Refills    albuterol (PROAIR HFA/PROVENTIL HFA/VENTOL*18 g   0        Sig: INHALE 2 PUFFS INTO THE LUNGS EVERY 6 HOURS AS NEEDED           FOR SHORTNESS OF BREATH DYSPNEA OR WHEEZING  Authorizing Provider: CRISPIN MICHAEL  Ordering User: ROLDAN WAGNER    Medication is being filled for 1 time josesito refill only due to:  Patient is due for Asthma follow up. Patient is in [place]. He will scheduled when back.     Please call and help schedule.  Thank you!

## 2021-07-02 DIAGNOSIS — J45.20 MILD INTERMITTENT ASTHMA WITHOUT COMPLICATION: ICD-10-CM

## 2021-07-02 RX ORDER — ALBUTEROL SULFATE 90 UG/1
AEROSOL, METERED RESPIRATORY (INHALATION)
Qty: 18 G | Refills: 0 | Status: SHIPPED | OUTPATIENT
Start: 2021-07-02 | End: 2021-09-21

## 2021-07-02 NOTE — TELEPHONE ENCOUNTER
Pending Prescriptions:                       Disp   Refills    albuterol (PROAIR HFA/PROVENTIL HFA/VENTOL*18 g   0            Routing refill request to provider for review/approval because:  Labs out of range:  trista Weldon RN on 7/2/2021 at 10:00 AM

## 2021-07-02 NOTE — TELEPHONE ENCOUNTER
Reason for Call:  Medication or medication refill:    Do you use a [facility]?  Name of the pharmacy and phone number for the current request:  [facility]    Name of the medication requested: albuterol (PROAIR HFA/PROVENTIL HFA/VENTOLIN HFA) 108 (90 Base) MCG/ACT     Other request: patient states he just got back from out of town and is leaving again in 2 hours, patient states his current inhaler is not working,and the other one is still in [place]. Please call to advise ASAP.     Can we leave a detailed message on this number? YES    Phone number patient can be reached at: Cell number on file:    Telephone Information:   Mobile [phone]       Best Time: ASAP     Call taken on 7/2/2021 at 8:41 AM by Katie Page

## 2021-09-15 ENCOUNTER — TELEPHONE (OUTPATIENT)
Dept: FAMILY MEDICINE | Facility: OTHER | Age: 46
End: 2021-09-15

## 2021-09-15 NOTE — TELEPHONE ENCOUNTER
Reason for Call: Request for an order or referral:    Order or referral being requested: hemochromatosis lab order    Date needed: as soon as possible    Has the patient been seen by the PCP for this problem? YES    Additional comments: please call patient with update.     Phone number Patient can be reached at:  Home number on file [phone] (home)    Best Time:  any    Can we leave a detailed message on this number?  YES    Call taken on 9/15/2021 at 4:55 PM by Eboni Amos     84

## 2021-09-16 NOTE — TELEPHONE ENCOUNTER
I have not seen patient in nearly a year and it was not in regards to hemachromatosis, was he already seeing Hematology/Oncology in another system? Who jayme the labs and why did they draw the labs? I usually refer Hemachromatosis patient's or concerns for this off to Hematology/Oncology.     Yeny Guerra PA-C

## 2021-09-16 NOTE — TELEPHONE ENCOUNTER
He does have a history of elevated ferritin but I have not seen him in 2 years. Will defer to ES who he saw last and has upcoming appt with her.    Uche Crespo PA-C

## 2021-09-20 NOTE — PROGRESS NOTES
Assessment & Plan     Moderate persistent asthma without complication  Mild intermittent asthma without complication  Notes no issues or symptoms.  ACT and AAP updated.   Refilled albuterol   - albuterol (PROAIR HFA/PROVENTIL HFA/VENTOLIN HFA) 108 (90 Base) MCG/ACT inhaler; INHALE 2 PUFFS INTO THE LUNGS EVERY 6 HOURS AS NEEDED FOR SHORTNESS OF BREATH DYSPNEA OR WHEEZING    Elevated ferritin  Elevated LFTs  Do see the Hematology note from 2019, mentioned ultrasound liver if LFTs remain elevated.  Will consider and order if appropriate.   If high ferritin he may want to consider again phlebotomy for this but it seems hematology was more concerned with alcohol causing issues with his liver labs.  We will adjust plan according to results.   - Comprehensive metabolic panel (BMP + Alb, Alk Phos, ALT, AST, Total. Bili, TP); Future  - CBC with platelets and differential; Future  - Iron and iron binding capacity; Future  - Ferritin; Future  - Ferritin  - Iron and iron binding capacity  - CBC with platelets and differential  - Comprehensive metabolic panel (BMP + Alb, Alk Phos, ALT, AST, Total. Bili, TP)    Elevated TSH  This was elevated as well but doesn't appear to have been readdressed, will check and address based on result.   - TSH with free T4 reflex; Future  - TSH with free T4 reflex    Need for hepatitis C screening test  Screening due  - Hepatitis C Screen Reflex to HCV RNA Quant and Genotype; Future  - Hepatitis C Screen Reflex to HCV RNA Quant and Genotype    Screening for HIV (human immunodeficiency virus)  Screening due  - HIV Antigen Antibody Combo; Future  - HIV Antigen Antibody Combo    Screening for hyperlipidemia  Screening Due  - Lipid panel reflex to direct LDL Fasting; Future  - Lipid panel reflex to direct LDL Fasting    Advanced care planning/counseling discussion  [employer] information with website link and given information about ACDs       BMI:   Estimated body mass index is 30.28 kg/m  as  "calculated from the following:    Height as of 10/14/20: 1.778 m (5' 10\").    Weight as of this encounter: 95.7 kg (211 lb).   Weight management plan: Discussed healthy diet and exercise guidelines        Return in about 1 year (around 9/21/2022) for Physical Exam, Lab Work, Medication Re-check.    Options for treatment and follow-up care were reviewed with the patient and/or guardian. Patient and/or guardian engaged in the decision making process and verbalized understanding of the options discussed and agreed with the final plan.    KIRILL Garay [facility] [place] Covarrub[place] is a 46 year old who presents for the following health issues     History of Present Illness     Asthma:  He presents for follow up of asthma.  He has no cough, some wheezing, and no shortness of breath. He is using a relief medication a few times a week. He does not have a controller medication. Patient is aware of the following triggers: animal dander and pollens. The patient has not had a visit to the Emergency Room, Urgent Care or Hospital due to asthma since the last clinic visit.     He eats 2-3 servings of fruits and vegetables daily.He consumes 0 sweetened beverage(s) daily.He exercises with enough effort to increase his heart rate 10 to 19 minutes per day.  He exercises with enough effort to increase his heart rate 3 or less days per week.   He is taking medications regularly.   Also discuss blood work -  Iron levels.   He had been told in the past he has high iron levels.  He saw Hematology back in 2019.  He hasn't done anything as far as phlebotomy in lab or with donating blood in recent times.  He did back when he was initially being evaluated. They also had concerns it was alcohol related so he significantly cut back on his intake and doesn't drink hard liquor anymore.     He notices occasional fatigue but he just thinks that might be normal and related to his motivation for that day.     Overall " asthma has been very well controlled.   He did get his COVID vaccination.   Continues to decline pneumococcal and influenza vaccinations.     Review of Systems   Constitutional, cardiovascular, pulmonary, GI, , musculoskeletal, neuro, skin, endocrine and psych systems are negative, except as otherwise noted.      Objective    /80   Pulse 86   Temp 98.1  F (36.7  C) (Temporal)   Resp 18   Wt 95.7 kg (211 lb)   SpO2 99%   BMI 30.28 kg/m    Body mass index is 30.28 kg/m .  Physical Exam   GENERAL: healthy, alert and no distress  NECK: no adenopathy, no asymmetry, masses, or scars and thyroid normal to palpation  RESP: lungs clear to auscultation - no rales, rhonchi or wheezes  CV: regular rate and rhythm, normal S1 S2, no S3 or S4, no murmur, click or rub, no peripheral edema and peripheral pulses strong  ABDOMEN: soft, nontender, no hepatosplenomegaly, no masses and bowel sounds normal  MS: no gross musculoskeletal defects noted, no edema  PSYCH: mentation appears normal, affect normal/bright    Results for orders placed or performed in visit on 09/21/21   CBC with platelets and differential     Status: None (In process)    Narrative    The following orders were created for panel order CBC with platelets and differential.  Procedure                               Abnormality         Status                     ---------                               -----------         ------                     CBC with platelets and d...[[id number]]                      In process                   Please view results for these tests on the individual orders.

## 2021-09-21 ENCOUNTER — OFFICE VISIT (OUTPATIENT)
Dept: FAMILY MEDICINE | Facility: OTHER | Age: 46
End: 2021-09-21
Payer: COMMERCIAL

## 2021-09-21 VITALS
WEIGHT: 211 LBS | BODY MASS INDEX: 30.28 KG/M2 | SYSTOLIC BLOOD PRESSURE: 130 MMHG | HEART RATE: 86 BPM | DIASTOLIC BLOOD PRESSURE: 80 MMHG | OXYGEN SATURATION: 99 % | RESPIRATION RATE: 18 BRPM | TEMPERATURE: 98.1 F

## 2021-09-21 DIAGNOSIS — Z71.89 ADVANCED CARE PLANNING/COUNSELING DISCUSSION: ICD-10-CM

## 2021-09-21 DIAGNOSIS — Z13.220 SCREENING FOR HYPERLIPIDEMIA: ICD-10-CM

## 2021-09-21 DIAGNOSIS — R79.89 ELEVATED LFTS: ICD-10-CM

## 2021-09-21 DIAGNOSIS — R79.89 ELEVATED TSH: ICD-10-CM

## 2021-09-21 DIAGNOSIS — R79.89 ELEVATED FERRITIN: ICD-10-CM

## 2021-09-21 DIAGNOSIS — Z11.4 SCREENING FOR HIV (HUMAN IMMUNODEFICIENCY VIRUS): ICD-10-CM

## 2021-09-21 DIAGNOSIS — J45.20 MILD INTERMITTENT ASTHMA WITHOUT COMPLICATION: ICD-10-CM

## 2021-09-21 DIAGNOSIS — Z11.59 NEED FOR HEPATITIS C SCREENING TEST: ICD-10-CM

## 2021-09-21 DIAGNOSIS — J45.40 MODERATE PERSISTENT ASTHMA WITHOUT COMPLICATION: Primary | ICD-10-CM

## 2021-09-21 LAB
ALBUMIN SERPL-MCNC: 4.2 G/DL (ref 3.4–5)
ALP SERPL-CCNC: 77 U/L (ref 40–150)
ALT SERPL W P-5'-P-CCNC: 60 U/L (ref 0–70)
ANION GAP SERPL CALCULATED.3IONS-SCNC: 6 MMOL/L (ref 3–14)
AST SERPL W P-5'-P-CCNC: 30 U/L (ref 0–45)
BASOPHILS # BLD AUTO: 0 10E3/UL (ref 0–0.2)
BASOPHILS NFR BLD AUTO: 1 %
BILIRUB SERPL-MCNC: 0.5 MG/DL (ref 0.2–1.3)
BUN SERPL-MCNC: 14 MG/DL (ref 7–30)
CALCIUM SERPL-MCNC: 9 MG/DL (ref 8.5–10.1)
CHLORIDE BLD-SCNC: 106 MMOL/L (ref 94–109)
CHOLEST SERPL-MCNC: 242 MG/DL
CO2 SERPL-SCNC: 27 MMOL/L (ref 20–32)
CREAT SERPL-MCNC: 0.91 MG/DL (ref 0.66–1.25)
EOSINOPHIL # BLD AUTO: 0.1 10E3/UL (ref 0–0.7)
EOSINOPHIL NFR BLD AUTO: 2 %
ERYTHROCYTE [DISTWIDTH] IN BLOOD BY AUTOMATED COUNT: 11.9 % (ref 10–15)
FASTING STATUS PATIENT QL REPORTED: YES
FERRITIN SERPL-MCNC: 421 NG/ML (ref 26–388)
GFR SERPL CREATININE-BSD FRML MDRD: >90 ML/MIN/1.73M2
GLUCOSE BLD-MCNC: 102 MG/DL (ref 70–99)
HCT VFR BLD AUTO: 46 % (ref 40–53)
HDLC SERPL-MCNC: 59 MG/DL
HGB BLD-MCNC: 16.1 G/DL (ref 13.3–17.7)
IMM GRANULOCYTES # BLD: 0 10E3/UL
IMM GRANULOCYTES NFR BLD: 0 %
IRON SATN MFR SERPL: 32 % (ref 15–46)
IRON SERPL-MCNC: 114 UG/DL (ref 35–180)
LDLC SERPL CALC-MCNC: 153 MG/DL
LYMPHOCYTES # BLD AUTO: 2.4 10E3/UL (ref 0.8–5.3)
LYMPHOCYTES NFR BLD AUTO: 43 %
MCH RBC QN AUTO: 34.4 PG (ref 26.5–33)
MCHC RBC AUTO-ENTMCNC: 35 G/DL (ref 31.5–36.5)
MCV RBC AUTO: 98 FL (ref 78–100)
MONOCYTES # BLD AUTO: 0.5 10E3/UL (ref 0–1.3)
MONOCYTES NFR BLD AUTO: 9 %
NEUTROPHILS # BLD AUTO: 2.6 10E3/UL (ref 1.6–8.3)
NEUTROPHILS NFR BLD AUTO: 45 %
NONHDLC SERPL-MCNC: 183 MG/DL
NRBC # BLD AUTO: 0 10E3/UL
NRBC BLD AUTO-RTO: 0 /100
PLATELET # BLD AUTO: 192 10E3/UL (ref 150–450)
POTASSIUM BLD-SCNC: 4.7 MMOL/L (ref 3.4–5.3)
PROT SERPL-MCNC: 7.9 G/DL (ref 6.8–8.8)
RBC # BLD AUTO: 4.68 10E6/UL (ref 4.4–5.9)
SODIUM SERPL-SCNC: 139 MMOL/L (ref 133–144)
T4 FREE SERPL-MCNC: 0.83 NG/DL (ref 0.76–1.46)
TIBC SERPL-MCNC: 361 UG/DL (ref 240–430)
TRIGL SERPL-MCNC: 151 MG/DL
TSH SERPL DL<=0.005 MIU/L-ACNC: 6.38 MU/L (ref 0.4–4)
WBC # BLD AUTO: 5.7 10E3/UL (ref 4–11)

## 2021-09-21 PROCEDURE — 84439 ASSAY OF FREE THYROXINE: CPT | Performed by: PHYSICIAN ASSISTANT

## 2021-09-21 PROCEDURE — 87389 HIV-1 AG W/HIV-1&-2 AB AG IA: CPT | Performed by: PHYSICIAN ASSISTANT

## 2021-09-21 PROCEDURE — 80050 GENERAL HEALTH PANEL: CPT | Performed by: PHYSICIAN ASSISTANT

## 2021-09-21 PROCEDURE — 82728 ASSAY OF FERRITIN: CPT | Performed by: PHYSICIAN ASSISTANT

## 2021-09-21 PROCEDURE — 86803 HEPATITIS C AB TEST: CPT | Performed by: PHYSICIAN ASSISTANT

## 2021-09-21 PROCEDURE — 83550 IRON BINDING TEST: CPT | Performed by: PHYSICIAN ASSISTANT

## 2021-09-21 PROCEDURE — 80061 LIPID PANEL: CPT | Performed by: PHYSICIAN ASSISTANT

## 2021-09-21 PROCEDURE — 36415 COLL VENOUS BLD VENIPUNCTURE: CPT | Performed by: PHYSICIAN ASSISTANT

## 2021-09-21 PROCEDURE — 99214 OFFICE O/P EST MOD 30 MIN: CPT | Performed by: PHYSICIAN ASSISTANT

## 2021-09-21 RX ORDER — ALBUTEROL SULFATE 90 UG/1
AEROSOL, METERED RESPIRATORY (INHALATION)
Qty: 18 G | Refills: 0 | Status: SHIPPED | OUTPATIENT
Start: 2021-09-21 | End: 2021-11-16

## 2021-09-21 ASSESSMENT — PAIN SCALES - GENERAL: PAINLEVEL: NO PAIN (0)

## 2021-09-21 NOTE — LETTER
My Asthma Action Plan    Name: Raffaele Guzman   YOB: 1975  Date: 9/21/2021   My doctor: Yeny Guerra PA-C   My clinic: [facility]        My Rescue Medicine:   Albuterol inhaler (Proair/Ventolin/Proventil HFA)  2-4 puffs EVERY 4 HOURS as needed. Use a spacer if recommended by your provider.   My Asthma Severity:   Intermittent / Exercise Induced  Know your asthma triggers:    upper respiratory infections  allergy           GREEN ZONE   Good Control    I feel good    No cough or wheeze    Can work, sleep and play without asthma symptoms       Take your asthma control medicine every day.     1. If exercise triggers your asthma, take your rescue medication    15 minutes before exercise or sports, and    During exercise if you have asthma symptoms  2. Spacer to use with inhaler: If you have a spacer, make sure to use it with your inhaler             YELLOW ZONE Getting Worse  I have ANY of these:    I do not feel good    Cough or wheeze    Chest feels tight    Wake up at night   1. Keep taking your Green Zone medications  2. Start taking your rescue medicine:    every 20 minutes for up to 1 hour. Then every 4 hours for 24-48 hours.  3. If you stay in the Yellow Zone for more than 12-24 hours, contact your doctor.  4. If you do not return to the Green Zone in 12-24 hours or you get worse, start taking your oral steroid medicine if prescribed by your provider.           RED ZONE Medical Alert - Get Help  I have ANY of these:    I feel awful    Medicine is not helping    Breathing getting harder    Trouble walking or talking    Nose opens wide to breathe       1. Take your rescue medicine NOW  2. If your provider has prescribed an oral steroid medicine, start taking it NOW  3. Call your doctor NOW  4. If you are still in the Red Zone after 20 minutes and you have not reached your doctor:    Take your rescue medicine again and    Call 911 or go to the emergency room right  away    See your regular doctor within 2 weeks of an Emergency Room or Urgent Care visit for follow-up treatment.          Annual Reminders:  Meet with Asthma Educator,  Flu Shot in the Fall, consider Pneumonia Vaccination for patients with asthma (aged 19 and older).    Pharmacy:    [id number] - [place], [place] - [address] AT [facility] [address]  [address]  [id number] - [place], [place] - [address] AT [facility] [address] [facility] [facility]  [facility] #[id number] - [place] [address] AT [facility] () & [place]    Electronically signed by Yeny Guerra PA-C   Date: 09/21/21                    Asthma Triggers  How To Control Things That Make Your Asthma Worse    Triggers are things that make your asthma worse.  Look at the list below to help you find your triggers and   what you can do about them. You can help prevent asthma flare-ups by staying away from your triggers.      Trigger                                                          What you can do   Cigarette Smoke  Tobacco smoke can make asthma worse. Do not allow smoking in your home, car or around you.  Be sure no one smokes at a child s day care or school.  If you smoke, ask your health care provider for ways to help you quit.  Ask family members to quit too.  Ask your health care provider for a referral to Quit Plan to help you quit smoking, or call 3-616-078-PLAN.     Colds, Flu, Bronchitis  These are common triggers of asthma. Wash your hands often.  Don t touch your eyes, nose or mouth.  Get a flu shot every year.     Dust Mites  These are tiny bugs that live in cloth or carpet. They are too small to see. Wash sheets and blankets in hot water every week.   Encase pillows and mattress in dust mite proof covers.  Avoid having carpet if you can. If you have carpet, vacuum weekly.   Use a dust mask and HEPA vacuum.   Pollen and Outdoor Mold  Some people are allergic to trees, grass, or weed  pollen, or molds. Try to keep your windows closed.  Limit time out doors when pollen count is high.   Ask you health care provider about taking medicine during allergy season.     Animal Dander  Some people are allergic to skin flakes, urine or saliva from pets with fur or feathers. Keep pets with fur or feathers out of your home.    If you can t keep the pet outdoors, then keep the pet out of your bedroom.  Keep the bedroom door closed.  Keep pets off cloth furniture and away from stuffed toys.     Mice, Rats, and Cockroaches  Some people are allergic to the waste from these pests.   Cover food and garbage.  Clean up spills and food crumbs.  Store grease in the refrigerator.   Keep food out of the bedroom.   Indoor Mold  This can be a trigger if your home has high moisture. Fix leaking faucets, pipes, or other sources of water.   Clean moldy surfaces.  Dehumidify basement if it is damp and smelly.   Smoke, Strong Odors, and Sprays  These can reduce air quality. Stay away from strong odors and sprays, such as perfume, powder, hair spray, paints, smoke incense, paint, cleaning products, candles and new carpet.   Exercise or Sports  Some people with asthma have this trigger. Be active!  Ask your doctor about taking medicine before sports or exercise to prevent symptoms.    Warm up for 5-10 minutes before and after sports or exercise.     Other Triggers of Asthma  Cold air:  Cover your nose and mouth with a scarf.  Sometimes laughing or crying can be a trigger.  Some medicines and food can trigger asthma.

## 2021-09-22 LAB
HCV AB SERPL QL IA: NONREACTIVE
HIV 1+2 AB+HIV1 P24 AG SERPL QL IA: NONREACTIVE

## 2021-09-22 ASSESSMENT — ASTHMA QUESTIONNAIRES: ACT_TOTALSCORE: 22

## 2021-09-26 ENCOUNTER — HEALTH MAINTENANCE LETTER (OUTPATIENT)
Age: 46
End: 2021-09-26

## 2021-11-16 DIAGNOSIS — J45.20 MILD INTERMITTENT ASTHMA WITHOUT COMPLICATION: ICD-10-CM

## 2021-11-16 RX ORDER — ALBUTEROL SULFATE 90 UG/1
AEROSOL, METERED RESPIRATORY (INHALATION)
Qty: 18 G | Refills: 3 | Status: SHIPPED | OUTPATIENT
Start: 2021-11-16 | End: 2021-12-28

## 2021-12-27 ENCOUNTER — OFFICE VISIT (OUTPATIENT)
Dept: FAMILY MEDICINE | Facility: OTHER | Age: 46
End: 2021-12-27
Payer: COMMERCIAL

## 2021-12-27 VITALS
SYSTOLIC BLOOD PRESSURE: 118 MMHG | TEMPERATURE: 98 F | DIASTOLIC BLOOD PRESSURE: 80 MMHG | HEART RATE: 88 BPM | OXYGEN SATURATION: 96 % | RESPIRATION RATE: 20 BRPM

## 2021-12-27 DIAGNOSIS — J22 LOWER RESP. TRACT INFECTION: Primary | ICD-10-CM

## 2021-12-27 PROCEDURE — 99213 OFFICE O/P EST LOW 20 MIN: CPT | Performed by: PHYSICIAN ASSISTANT

## 2021-12-27 RX ORDER — DOXYCYCLINE 100 MG/1
100 CAPSULE ORAL 2 TIMES DAILY
Qty: 20 CAPSULE | Refills: 0 | Status: SHIPPED | OUTPATIENT
Start: 2021-12-27 | End: 2022-01-06

## 2021-12-27 ASSESSMENT — PAIN SCALES - GENERAL: PAINLEVEL: NO PAIN (0)

## 2021-12-27 NOTE — PROGRESS NOTES
Assessment & Plan     Lower resp. tract infection  Given length of symptoms we are concerned for potential bacterial infection.  He otherwise appears well. He has been doing serial COVID testing with negative results at this point.  Did start on Doxycycline for type of coverage of bacteria.  Recommended use of Albuterol regularly. Discussed OTC therapies as well including nasal steroids to help with any congestion/drainage that he may be experiencing.   Encouraged Pneumonia vaccination, COVID and influenza vaccinations, he will think about it but generally declines.   For now we are holding on prednisone but consider use in the future given asthma history.   - doxycycline monohydrate (MONODOX) 100 MG capsule; Take 1 capsule (100 mg) by mouth 2 times daily for 10 days    Return in about 5 days (around 1/1/2022) for If not improving, sooner if worse or new concerns.    Options for treatment and follow-up care were reviewed with the patient and/or guardian. Patient and/or guardian engaged in the decision making process and verbalized understanding of the options discussed and agreed with the final plan.    KIRILL Garay [facility]    Alma Covarrubias is a 46 year old who presents for the following health issues     HPI     Acute Illness  Acute illness concerns: Cough-did at home covid test [place] which was Negative. Has concerns about pneumonia.   Onset/Duration: 1 month  Symptoms:  Fever: no  Chills/Sweats: YES-chills  Headache (location?): YES  Sinus Pressure: YES - worse in the AM.   Conjunctivitis:  no  Ear Pain: no  Rhinorrhea: YES  Congestion: YES  Sore Throat: no  Cough: YES-productive of yellow sputum, with shortness of breath, improving over time  Wheeze: YES  Decreased Appetite: no  Nausea: no  Vomiting: no  Diarrhea: no  Dysuria/Freq.: no  Dysuria or Hematuria: no  Fatigue/Achiness: YES  Sick/Strep Exposure: YES  Therapies tried and outcome: None    -Did feel well  "for a couple of days around Christmas but then the symptoms came back \"really heavy\" on Christmas.   - Did COVID at home rapid antigen on Christmas Day morning.     Review of Systems   Constitutional, HEENT, cardiovascular, pulmonary, gi and gu systems are negative, except as otherwise noted.      Objective    /80   Pulse 88   Temp 98  F (36.7  C) (Temporal)   Resp 20   SpO2 96%   There is no height or weight on file to calculate BMI.  Physical Exam   GENERAL: healthy, alert and no distress  EYES: Eyes grossly normal to inspection, PERRL and conjunctivae and sclerae normal  EYES: Eyes grossly normal to inspection and EOMI  NECK: no adenopathy, no asymmetry, masses, or scars and thyroid normal to palpation  RESP: wheezing throughout right lung fields, otherwise clear to auscultation, good motion of air throughout.   CV: regular rate and rhythm, normal S1 S2, no S3 or S4, no murmur, click or rub, no peripheral edema and peripheral pulses strong  MS: no gross musculoskeletal defects noted, no edema                "

## 2021-12-28 DIAGNOSIS — J45.20 MILD INTERMITTENT ASTHMA WITHOUT COMPLICATION: ICD-10-CM

## 2021-12-28 RX ORDER — ALBUTEROL SULFATE 90 UG/1
AEROSOL, METERED RESPIRATORY (INHALATION)
Qty: 18 G | Refills: 3 | Status: SHIPPED | OUTPATIENT
Start: 2021-12-28 | End: 2022-02-22

## 2022-01-15 ENCOUNTER — HEALTH MAINTENANCE LETTER (OUTPATIENT)
Age: 47
End: 2022-01-15

## 2022-02-22 DIAGNOSIS — J45.20 MILD INTERMITTENT ASTHMA WITHOUT COMPLICATION: ICD-10-CM

## 2022-02-22 RX ORDER — ALBUTEROL SULFATE 90 UG/1
AEROSOL, METERED RESPIRATORY (INHALATION)
Qty: 18 G | Refills: 0 | Status: SHIPPED | OUTPATIENT
Start: 2022-02-22 | End: 2022-03-25

## 2022-02-26 ENCOUNTER — OFFICE VISIT (OUTPATIENT)
Dept: URGENT CARE | Facility: URGENT CARE | Age: 47
End: 2022-02-26
Payer: COMMERCIAL

## 2022-02-26 VITALS
HEART RATE: 111 BPM | OXYGEN SATURATION: 94 % | BODY MASS INDEX: 29.3 KG/M2 | DIASTOLIC BLOOD PRESSURE: 87 MMHG | SYSTOLIC BLOOD PRESSURE: 133 MMHG | TEMPERATURE: 99.2 F | WEIGHT: 204.2 LBS

## 2022-02-26 DIAGNOSIS — R05.9 COUGH: ICD-10-CM

## 2022-02-26 DIAGNOSIS — J45.20 MILD INTERMITTENT ASTHMA, UNSPECIFIED WHETHER COMPLICATED: Primary | ICD-10-CM

## 2022-02-26 DIAGNOSIS — J01.10 ACUTE NON-RECURRENT FRONTAL SINUSITIS: ICD-10-CM

## 2022-02-26 PROCEDURE — 99214 OFFICE O/P EST MOD 30 MIN: CPT | Performed by: FAMILY MEDICINE

## 2022-02-26 RX ORDER — PREDNISONE 20 MG/1
TABLET ORAL
Qty: 10 TABLET | Refills: 0 | Status: SHIPPED | OUTPATIENT
Start: 2022-02-26 | End: 2022-03-28

## 2022-02-26 NOTE — PROGRESS NOTES
Assessment & Plan     Mild intermittent asthma, unspecified whether complicated  Advised with supportive care, continue with albuterol inhaler as needed.  Discussed with patient if he continued to have symptoms, or recurrent symptoms he may need to have inhaled steroids.  Advised patient to quit the smoking.  - amoxicillin-clavulanate (AUGMENTIN) 875-125 MG tablet; Take 1 tablet by mouth 2 times daily    Cough    - predniSONE (DELTASONE) 20 MG tablet; One tab bid for 5 day  - amoxicillin-clavulanate (AUGMENTIN) 875-125 MG tablet; Take 1 tablet by mouth 2 times daily    Acute non-recurrent frontal sinusitis    - amoxicillin-clavulanate (AUGMENTIN) 875-125 MG tablet; Take 1 tablet by mouth 2 times daily       Tobacco Cessation:   reports that he has been smoking cigars and cigarettes. He has been smoking about 0.00 packs per day. He has never used smokeless tobacco.  Tobacco Cessation Action Plan: advised pt to quite    Work on weight loss  Regular exercise    No follow-ups on file.    Parish Joshua MD  [facility] [place] Covarrubias is a 46 year old who presents for the following health issues:  Patient reports he has been having sinus congestion, postnasal drainage, coughing and wheezing for the past week or so.  He has no fever, no chills, denies chest pain, denies being lightheaded or dizzy.  No history of travel or sick contact.  History of Asthma  He is fully vaccinated, with 2 shot of COVID vaccine.  No lower extremity  HPI       Review of Systems   Constitutional, HEENT, cardiovascular, pulmonary, gi and gu systems are negative, except as otherwise noted.      Objective    /87 (BP Location: Right arm, Patient Position: Sitting, Cuff Size: Adult Large)   Pulse 111   Temp 99.2  F (37.3  C) (Tympanic)   Wt 92.6 kg (204 lb 3.2 oz)   SpO2 94%   BMI 29.30 kg/m    Body mass index is 29.3 kg/m .  Physical Exam   GENERAL: healthy, alert and no distress  HENT: ear canals  and TM's normal, nose and mouth without ulcers or lesions  RESP: expiratory wheezes throughout and inspiratory wheezes throughout  CV: regular rate and rhythm, normal S1 S2, no S3 or S4, no murmur, click or rub, no peripheral edema and peripheral pulses strong  PSYCH: mentation appears normal, affect normal/bright        Parish Joshua MD

## 2022-03-25 ENCOUNTER — TELEPHONE (OUTPATIENT)
Dept: FAMILY MEDICINE | Facility: OTHER | Age: 47
End: 2022-03-25
Payer: COMMERCIAL

## 2022-03-25 DIAGNOSIS — J45.20 MILD INTERMITTENT ASTHMA WITHOUT COMPLICATION: ICD-10-CM

## 2022-03-25 RX ORDER — ALBUTEROL SULFATE 90 UG/1
AEROSOL, METERED RESPIRATORY (INHALATION)
Qty: 18 G | Refills: 1 | Status: SHIPPED | OUTPATIENT
Start: 2022-03-25 | End: 2022-06-08

## 2022-03-25 NOTE — TELEPHONE ENCOUNTER
Albuterol Proair  Prescription approved per [facility] Refill Protocol.  Patient has upcoming office visit for further refills.  Scheduled patient for office visit follow up asthma.    Chey Trivedi RN

## 2022-03-28 ENCOUNTER — OFFICE VISIT (OUTPATIENT)
Dept: FAMILY MEDICINE | Facility: OTHER | Age: 47
End: 2022-03-28
Payer: COMMERCIAL

## 2022-03-28 VITALS
HEART RATE: 105 BPM | HEIGHT: 70 IN | WEIGHT: 207.31 LBS | SYSTOLIC BLOOD PRESSURE: 128 MMHG | BODY MASS INDEX: 29.68 KG/M2 | OXYGEN SATURATION: 93 % | DIASTOLIC BLOOD PRESSURE: 70 MMHG | TEMPERATURE: 98.7 F | RESPIRATION RATE: 20 BRPM

## 2022-03-28 DIAGNOSIS — J45.20 MILD INTERMITTENT ASTHMA WITHOUT COMPLICATION: Primary | ICD-10-CM

## 2022-03-28 PROCEDURE — 99213 OFFICE O/P EST LOW 20 MIN: CPT | Performed by: PHYSICIAN ASSISTANT

## 2022-03-28 ASSESSMENT — ASTHMA QUESTIONNAIRES
QUESTION_2 LAST FOUR WEEKS HOW OFTEN HAVE YOU HAD SHORTNESS OF BREATH: THREE TO SIX TIMES A WEEK
ACT_TOTALSCORE: 16
QUESTION_3 LAST FOUR WEEKS HOW OFTEN DID YOUR ASTHMA SYMPTOMS (WHEEZING, COUGHING, SHORTNESS OF BREATH, CHEST TIGHTNESS OR PAIN) WAKE YOU UP AT NIGHT OR EARLIER THAN USUAL IN THE MORNING: ONCE A WEEK
QUESTION_4 LAST FOUR WEEKS HOW OFTEN HAVE YOU USED YOUR RESCUE INHALER OR NEBULIZER MEDICATION (SUCH AS ALBUTEROL): ONE OR TWO TIMES PER DAY
QUESTION_5 LAST FOUR WEEKS HOW WOULD YOU RATE YOUR ASTHMA CONTROL: SOMEWHAT CONTROLLED
ACT_TOTALSCORE: 16
QUESTION_1 LAST FOUR WEEKS HOW MUCH OF THE TIME DID YOUR ASTHMA KEEP YOU FROM GETTING AS MUCH DONE AT WORK, SCHOOL OR AT HOME: NONE OF THE TIME

## 2022-03-28 ASSESSMENT — PAIN SCALES - GENERAL: PAINLEVEL: NO PAIN (0)

## 2022-03-28 NOTE — PROGRESS NOTES
Assessment & Plan     Mild intermittent asthma without complication  Elected to start on ICS inhaler and continue with albuterol as needed  He is aware and will rinse mouth appropriately.   Recommend follow-up via MyChart in a month, sooner if he is having another exacerbation.   - beclomethasone HFA (QVAR REDIHALER) 40 MCG/ACT inhaler; Inhale 1 puff into the lungs 2 times daily  - Recommend pneumovax this fall.   - Declines COVID vaccination.     Return in about 4 weeks (around 4/25/2022) for Medication Re-check.    Options for treatment and follow-up care were reviewed with the patient and/or guardian. Patient and/or guardian engaged in the decision making process and verbalized understanding of the options discussed and agreed with the final plan.    Yeny Guerra PA-C  [facility] [place] Covarrubias is a 46 year old who presents for the following health issues     History of Present Illness     Asthma:  He presents for follow up of asthma.  He has some cough, some wheezing, and some shortness of breath. He is using a relief medication 2-3 times per day. He does not have a controller medication. Patient is aware of the following triggers: exercise or sports. The patient has had a visit to the Emergency Room, Urgent Care or Hospital due to asthma since the last clinic visit. He has been to the Emergency Room or Urgent Care 1 time.He has had a Hospitalization 0 times.    He eats 0-1 servings of fruits and vegetables daily.He consumes 2 sweetened beverage(s) daily.He exercises with enough effort to increase his heart rate 9 or less minutes per day.  He exercises with enough effort to increase his heart rate 3 or less days per week.   He is taking medications regularly.     - Didn't get better after he was seen last by provider, went to  and was given steroids and antibiotics, after that resolved, was feeling good for a while but then starting to notice symptoms again.   - Had been on  "daily inhaler in the past, nothing recent, uses albuterol regularly.   - Not sure if he has a specific trigger or not.     Review of Systems   Constitutional, HEENT, cardiovascular, pulmonary, gi and gu systems are negative, except as otherwise noted.      Objective    /70   Pulse 105   Temp 98.7  F (37.1  C) (Temporal)   Resp 20   Ht 1.778 m (5' 10\")   Wt 94 kg (207 lb 5 oz)   SpO2 93%   BMI 29.75 kg/m    Body mass index is 29.75 kg/m .  Physical Exam   GENERAL: healthy, alert and no distress  MS: no gross musculoskeletal defects noted, no edema  SKIN: no suspicious lesions or rashes  PSYCH: mentation appears normal, affect normal/bright            "

## 2022-06-07 DIAGNOSIS — J45.20 MILD INTERMITTENT ASTHMA WITHOUT COMPLICATION: ICD-10-CM

## 2022-06-08 RX ORDER — ALBUTEROL SULFATE 90 UG/1
AEROSOL, METERED RESPIRATORY (INHALATION)
Qty: 6.7 G | Refills: 0 | Status: SHIPPED | OUTPATIENT
Start: 2022-06-08 | End: 2022-10-20

## 2022-06-08 RX ORDER — ALBUTEROL SULFATE 90 UG/1
AEROSOL, METERED RESPIRATORY (INHALATION)
Qty: 17 G | Refills: 0 | Status: SHIPPED | OUTPATIENT
Start: 2022-06-08 | End: 2022-12-27

## 2022-10-20 DIAGNOSIS — J45.20 MILD INTERMITTENT ASTHMA WITHOUT COMPLICATION: ICD-10-CM

## 2022-10-20 RX ORDER — ALBUTEROL SULFATE 90 UG/1
AEROSOL, METERED RESPIRATORY (INHALATION)
Qty: 6.7 G | Refills: 0 | Status: SHIPPED | OUTPATIENT
Start: 2022-10-20 | End: 2022-11-09

## 2022-10-20 NOTE — TELEPHONE ENCOUNTER
"Pending Prescriptions:                       Disp   Refills    albuterol (PROAIR HFA/PROVENTIL HFA/VENTOL*6.7 g  0        Sig: INHALE 2 PUFFS INTO THE LUNGS EVERY 6 HOURS AS NEEDED           FOR SHORTNESS OF BREATH OR DIFFICULT BREATHING OR           WHEEZING    Routing refill request to provider for review/approval because:  Requested Prescriptions   Pending Prescriptions Disp Refills    albuterol (PROAIR HFA/PROVENTIL HFA/VENTOLIN HFA) 108 (90 Base) MCG/ACT inhaler [Pharmacy Med Name: ALBUTEROL HFA INH (200 PUFFS) 6.7GM] 6.7 g 0     Sig: INHALE 2 PUFFS INTO THE LUNGS EVERY 6 HOURS AS NEEDED FOR SHORTNESS OF BREATH OR DIFFICULT BREATHING OR WHEEZING       Asthma Maintenance Inhalers - Anticholinergics Failed - 10/20/2022 12:56 PM        Failed - Asthma control assessment score within normal limits in last 6 months     Please review ACT score.           Failed - Recent (6 mo) or future (30 days) visit within the authorizing provider's specialty     Patient had office visit in the last 6 months or has a visit in the next 30 days with authorizing provider or within the authorizing provider's specialty.  See \"Patient Info\" tab in inbasket, or \"Choose Columns\" in Meds & Orders section of the refill encounter.            Passed - Patient is age 12 years or older        Passed - Medication is active on med list       Short-Acting Beta Agonist Inhalers Protocol  Failed - 10/20/2022 12:56 PM        Failed - Asthma control assessment score within normal limits in last 6 months     Please review ACT score.   ACT Total Scores 3/28/2022   ACT TOTAL SCORE -   ASTHMA ER VISITS -   ASTHMA HOSPITALIZATIONS -   ACT TOTAL SCORE (Goal Greater than or Equal to 20) 16   In the past 12 months, how many times did you visit the emergency room for your asthma without being admitted to the hospital? 0   In the past 12 months, how many times were you hospitalized overnight because of your asthma? 0             Failed - Recent (6 mo) or future " "(30 days) visit within the authorizing provider's specialty     Patient had office visit in the last 6 months or has a visit in the next 30 days with authorizing provider or within the authorizing provider's specialty.  See \"Patient Info\" tab in inbasket, or \"Choose Columns\" in Meds & Orders section of the refill encounter.            Passed - Patient is age 12 or older        Passed - Medication is active on med list           Moraima Delgado RN on 10/20/2022 at 1:40 PM        "

## 2022-11-08 DIAGNOSIS — J45.20 MILD INTERMITTENT ASTHMA WITHOUT COMPLICATION: ICD-10-CM

## 2022-11-09 RX ORDER — ALBUTEROL SULFATE 90 UG/1
AEROSOL, METERED RESPIRATORY (INHALATION)
Qty: 6.7 G | Refills: 0 | Status: SHIPPED | OUTPATIENT
Start: 2022-11-09 | End: 2022-11-25

## 2022-11-09 NOTE — TELEPHONE ENCOUNTER
Pt stated he is dealing with a cold and is almost out of his inhaler. Was hoping to pick this up asap.

## 2022-11-10 NOTE — TELEPHONE ENCOUNTER
Please call and triage to see if he needs an apt per Yeny.    Louann Lopez RN on 11/10/2022 at 2:01 PM

## 2022-11-10 NOTE — TELEPHONE ENCOUNTER
"Contacted pt. He states that he has been dealing with a cold for 3 weeks. States that he has some SOB. He states that this is \"standard asthma stuff\" for him. He states that once he uses his inhaler his SOB resolves. States that he does have some congestion and sinus pressure. Recommended an e-visit if sinus pressure has been ongoing for more than 10 days, which it has, but pt refuses. He states that he would like to be seen in person. No coughing while on the phone. Pt is able to talk in full sentences. Advised pt to be seen urgently if SOB worsens beyond his usual asthma symptoms. He verbalized understanding.     PCP-are you able to see pt sooner than next available for his 3 week history of cold symptoms?     ANASTACIO MachadoN, RN, PHN  Registered Nurse-Clinic Triage  [facility]  11/10/2022 at 3:05 PM    "

## 2022-11-14 NOTE — TELEPHONE ENCOUNTER
Pt wants to be seen in person per  notes, called  Unable to leave VM due to full mailbox- when he calls back are you ok with f2f?     Rhonda Montelongo MA

## 2022-11-21 ENCOUNTER — TELEPHONE (OUTPATIENT)
Dept: FAMILY MEDICINE | Facility: OTHER | Age: 47
End: 2022-11-21

## 2022-11-21 DIAGNOSIS — J45.20 MILD INTERMITTENT ASTHMA WITHOUT COMPLICATION: ICD-10-CM

## 2022-11-25 RX ORDER — ALBUTEROL SULFATE 90 UG/1
2 AEROSOL, METERED RESPIRATORY (INHALATION) EVERY 6 HOURS PRN
Qty: 6.7 G | Refills: 0 | Status: SHIPPED | OUTPATIENT
Start: 2022-11-25 | End: 2022-12-20

## 2022-11-25 NOTE — TELEPHONE ENCOUNTER
"Pending Prescriptions:                       Disp   Refills    albuterol (PROAIR HFA/PROVENTIL HFA/VENTOL*6.7 g  0        Sig: Inhale 2 puffs into the lungs every 6 hours as needed           for shortness of breath / dyspnea or wheezing    Routing refill request to provider for review/approval because:  Lost inhaler  Requested Prescriptions   Pending Prescriptions Disp Refills    albuterol (PROAIR HFA/PROVENTIL HFA/VENTOLIN HFA) 108 (90 Base) MCG/ACT inhaler 6.7 g 0     Sig: Inhale 2 puffs into the lungs every 6 hours as needed for shortness of breath / dyspnea or wheezing       Asthma Maintenance Inhalers - Anticholinergics Failed - 11/25/2022 10:33 AM        Failed - Asthma control assessment score within normal limits in last 6 months     Please review ACT score.           Failed - Recent (6 mo) or future (30 days) visit within the authorizing provider's specialty     Patient had office visit in the last 6 months or has a visit in the next 30 days with authorizing provider or within the authorizing provider's specialty.  See \"Patient Info\" tab in inbasket, or \"Choose Columns\" in Meds & Orders section of the refill encounter.            Passed - Patient is age 12 years or older        Passed - Medication is active on med list       Short-Acting Beta Agonist Inhalers Protocol  Failed - 11/25/2022 10:33 AM        Failed - Asthma control assessment score within normal limits in last 6 months     Please review ACT score.           Failed - Recent (6 mo) or future (30 days) visit within the authorizing provider's specialty     Patient had office visit in the last 6 months or has a visit in the next 30 days with authorizing provider or within the authorizing provider's specialty.  See \"Patient Info\" tab in inbasket, or \"Choose Columns\" in Meds & Orders section of the refill encounter.            Passed - Patient is age 12 or older        Passed - Medication is active on med list                 "

## 2022-11-25 NOTE — TELEPHONE ENCOUNTER
Last refill before his appointment, if he is using it that frequently he needs a visit sooner than a month from now.     Yeny Guerra PA-C

## 2022-11-28 NOTE — TELEPHONE ENCOUNTER
RN Triage    Patient Contact    Attempt # 1    Was call answered?  No.  Left message on voicemail with information to call me back.    YEISON Vargas, RN  [email or web address]  November 28, 2022

## 2022-12-20 DIAGNOSIS — J45.20 MILD INTERMITTENT ASTHMA WITHOUT COMPLICATION: ICD-10-CM

## 2022-12-20 RX ORDER — ALBUTEROL SULFATE 90 UG/1
AEROSOL, METERED RESPIRATORY (INHALATION)
Qty: 6.7 G | Refills: 0 | Status: SHIPPED | OUTPATIENT
Start: 2022-12-20 | End: 2022-12-27

## 2022-12-27 ENCOUNTER — OFFICE VISIT (OUTPATIENT)
Dept: FAMILY MEDICINE | Facility: OTHER | Age: 47
End: 2022-12-27
Payer: COMMERCIAL

## 2022-12-27 ENCOUNTER — ANCILLARY PROCEDURE (OUTPATIENT)
Dept: GENERAL RADIOLOGY | Facility: OTHER | Age: 47
End: 2022-12-27
Attending: PHYSICIAN ASSISTANT
Payer: COMMERCIAL

## 2022-12-27 VITALS
HEIGHT: 69 IN | TEMPERATURE: 99.3 F | WEIGHT: 192.5 LBS | HEART RATE: 103 BPM | BODY MASS INDEX: 28.51 KG/M2 | RESPIRATION RATE: 14 BRPM | DIASTOLIC BLOOD PRESSURE: 78 MMHG | OXYGEN SATURATION: 96 % | SYSTOLIC BLOOD PRESSURE: 120 MMHG

## 2022-12-27 DIAGNOSIS — J45.21 MILD INTERMITTENT ASTHMA WITH EXACERBATION: Primary | ICD-10-CM

## 2022-12-27 DIAGNOSIS — J45.40 MODERATE PERSISTENT ASTHMA WITHOUT COMPLICATION: ICD-10-CM

## 2022-12-27 DIAGNOSIS — J45.21 MILD INTERMITTENT ASTHMA WITH EXACERBATION: ICD-10-CM

## 2022-12-27 PROCEDURE — 99213 OFFICE O/P EST LOW 20 MIN: CPT | Performed by: PHYSICIAN ASSISTANT

## 2022-12-27 PROCEDURE — 71046 X-RAY EXAM CHEST 2 VIEWS: CPT | Mod: TC | Performed by: RADIOLOGY

## 2022-12-27 RX ORDER — PREDNISONE 20 MG/1
40 TABLET ORAL DAILY
Qty: 10 TABLET | Refills: 0 | Status: SHIPPED | OUTPATIENT
Start: 2022-12-27 | End: 2023-01-01

## 2022-12-27 RX ORDER — ALBUTEROL SULFATE 90 UG/1
AEROSOL, METERED RESPIRATORY (INHALATION)
Qty: 17 G | Refills: 0 | Status: SHIPPED | OUTPATIENT
Start: 2022-12-27 | End: 2023-03-03

## 2022-12-27 RX ORDER — FLUTICASONE PROPIONATE AND SALMETEROL XINAFOATE 115; 21 UG/1; UG/1
2 AEROSOL, METERED RESPIRATORY (INHALATION) 2 TIMES DAILY
Qty: 12 G | Refills: 5 | Status: SHIPPED | OUTPATIENT
Start: 2022-12-27 | End: 2024-02-06

## 2022-12-27 ASSESSMENT — ASTHMA QUESTIONNAIRES
QUESTION_5 LAST FOUR WEEKS HOW WOULD YOU RATE YOUR ASTHMA CONTROL: POORLY CONTROLLED
QUESTION_2 LAST FOUR WEEKS HOW OFTEN HAVE YOU HAD SHORTNESS OF BREATH: MORE THAN ONCE A DAY
QUESTION_3 LAST FOUR WEEKS HOW OFTEN DID YOUR ASTHMA SYMPTOMS (WHEEZING, COUGHING, SHORTNESS OF BREATH, CHEST TIGHTNESS OR PAIN) WAKE YOU UP AT NIGHT OR EARLIER THAN USUAL IN THE MORNING: TWO OR THREE NIGHTS A WEEK
ACT_TOTALSCORE: 11
QUESTION_1 LAST FOUR WEEKS HOW MUCH OF THE TIME DID YOUR ASTHMA KEEP YOU FROM GETTING AS MUCH DONE AT WORK, SCHOOL OR AT HOME: NONE OF THE TIME
QUESTION_4 LAST FOUR WEEKS HOW OFTEN HAVE YOU USED YOUR RESCUE INHALER OR NEBULIZER MEDICATION (SUCH AS ALBUTEROL): THREE OR MORE TIMES PER DAY
ACT_TOTALSCORE: 11

## 2022-12-27 ASSESSMENT — PAIN SCALES - GENERAL: PAINLEVEL: NO PAIN (0)

## 2022-12-27 NOTE — PROGRESS NOTES
Assessment & Plan     Mild intermittent asthma with exacerbation  Moderate persistent asthma without complication  Suspect that it is actually his asthma that has gotten worse and has acute exacerbation.  Did obtain x-ray to rule out other concerns such as pneumonia, x-ray was clear.  Elected to start on Prednisone for 5 days, reminded to take in the AM with food in stomach. Continue Albuterol PRN and we will start Advair inhaler to see if this helps control his day to day symptoms better. If needed will add on Azithromycin to his regimen as well.   - fluticasone-salmeterol (ADVAIR HFA) 115-21 MCG/ACT inhaler; Inhale 2 puffs into the lungs 2 times daily  - albuterol (PROAIR HFA/PROVENTIL HFA/VENTOLIN HFA) 108 (90 Base) MCG/ACT inhaler; INHALE 2 PUFFS BY MOUTH INTO LUNGS EVERY 6 HOURS AS NEEDED FOR SHORTNESS OF BREATH, DYSPNEA OR WHEEZING  - XR Chest 2 Views; Future  - predniSONE (DELTASONE) 20 MG tablet; Take 2 tablets (40 mg) by mouth daily for 5 days    Options for treatment and follow-up care were reviewed with the patient and/or guardian. Patient and/or guardian engaged in the decision making process and verbalized understanding of the options discussed and agreed with the final plan.    Yeny Guerra PA-C  [facility] SELENA Covarrubias is a 47 year old, presenting for the following health issues:  Asthma      History of Present Illness       Reason for visit:  Asthma issues    He eats 0-1 servings of fruits and vegetables daily.He consumes 1 sweetened beverage(s) daily.He exercises with enough effort to increase his heart rate 60 or more minutes per day.  He exercises with enough effort to increase his heart rate 5 days per week.   He is taking medications regularly.       Asthma Follow-Up    Was ACT completed today?    Yes    ACT Total Scores 12/27/2022   ACT TOTAL SCORE -   ASTHMA ER VISITS -   ASTHMA HOSPITALIZATIONS -   ACT TOTAL SCORE (Goal Greater than or Equal to 20) 11  "  In the past 12 months, how many times did you visit the emergency room for your asthma without being admitted to the hospital? 0   In the past 12 months, how many times were you hospitalized overnight because of your asthma? 0       How many days per week do you miss taking your asthma controller medication?  I do not have an asthma controller medication    Please describe any recent triggers for your asthma: cold air    Have you had any Emergency Room Visits, Urgent Care Visits, or Hospital Admissions since your last office visit?  No    Notes that he had previously had COVID and then ever since then seems like his asthma has gotten worse.   He doesn't necessarily feel like he got sick again but that he just is coughing a lot and wheezing.  He needs his albuterol most days, but does admit sometimes there are days that aren't as bad and he can go without using.        Review of Systems   Constitutional, HEENT, cardiovascular, pulmonary, gi and gu systems are negative, except as otherwise noted.      Objective    /78   Pulse 103   Temp 99.3  F (37.4  C) (Temporal)   Resp 14   Ht 1.75 m (5' 8.9\")   Wt 87.3 kg (192 lb 8 oz)   SpO2 96%   BMI 28.51 kg/m    Body mass index is 28.51 kg/m .  Physical Exam   GENERAL: healthy, alert and no distress  NECK: no adenopathy, no asymmetry, masses, or scars and thyroid normal to palpation  RESP: Wheezing diffusely throughout,  CV: regular rate and rhythm, normal S1 S2, no S3 or S4, no murmur, click or rub, no peripheral edema and peripheral pulses strong  MS: no gross musculoskeletal defects noted, no edema  PSYCH: mentation appears normal, affect normal/bright    Results for orders placed or performed in visit on 12/27/22   XR Chest 2 Views     Status: None    Narrative    EXAM: XR CHEST 2 VIEWS  LOCATION: [facility]  DATE/TIME: 12/27/2022 5:12 PM    INDICATION:  Mild intermittent asthma with exacerbation  COMPARISON: None.      Impression    " IMPRESSION: Negative chest.

## 2023-03-03 DIAGNOSIS — J45.40 MODERATE PERSISTENT ASTHMA WITHOUT COMPLICATION: ICD-10-CM

## 2023-03-03 RX ORDER — ALBUTEROL SULFATE 90 UG/1
AEROSOL, METERED RESPIRATORY (INHALATION)
Qty: 17 G | Refills: 0 | Status: SHIPPED | OUTPATIENT
Start: 2023-03-03 | End: 2023-04-14

## 2023-04-14 DIAGNOSIS — J45.40 MODERATE PERSISTENT ASTHMA WITHOUT COMPLICATION: ICD-10-CM

## 2023-04-14 RX ORDER — ALBUTEROL SULFATE 90 UG/1
AEROSOL, METERED RESPIRATORY (INHALATION)
Qty: 17 G | Refills: 0 | Status: SHIPPED | OUTPATIENT
Start: 2023-04-14 | End: 2023-05-25

## 2023-04-23 ENCOUNTER — HEALTH MAINTENANCE LETTER (OUTPATIENT)
Age: 48
End: 2023-04-23

## 2023-05-25 DIAGNOSIS — J45.40 MODERATE PERSISTENT ASTHMA WITHOUT COMPLICATION: ICD-10-CM

## 2023-05-25 RX ORDER — ALBUTEROL SULFATE 90 UG/1
AEROSOL, METERED RESPIRATORY (INHALATION)
Qty: 17 G | Refills: 0 | Status: SHIPPED | OUTPATIENT
Start: 2023-05-25 | End: 2023-06-09

## 2023-06-09 DIAGNOSIS — J45.40 MODERATE PERSISTENT ASTHMA WITHOUT COMPLICATION: ICD-10-CM

## 2023-06-09 RX ORDER — ALBUTEROL SULFATE 90 UG/1
AEROSOL, METERED RESPIRATORY (INHALATION)
Qty: 18 G | Refills: 0 | Status: SHIPPED | OUTPATIENT
Start: 2023-06-09 | End: 2023-06-23

## 2023-06-09 RX ORDER — ALBUTEROL SULFATE 90 UG/1
AEROSOL, METERED RESPIRATORY (INHALATION)
Qty: 17 G | Refills: 0 | OUTPATIENT
Start: 2023-06-09

## 2023-06-09 NOTE — TELEPHONE ENCOUNTER
"Pending Prescriptions:                       Disp   Refills    albuterol (PROAIR HFA/PROVENTIL HFA/VENTOL*18 g            Sig: INHALE 2 PUFFS BY MOUTH EVERY 6 HOURS AS NEEDED FOR           SHORTNESS OF BREATH OR DIFFICULT BREATHING OR           WHEEZING    Routing refill request to provider for review/approval because:  Labs out of range:  ACT    PHQ-9 score:        10/24/2019     9:58 AM   PHQ   PHQ-9 Total Score 4   Q9: Thoughts of better off dead/self-harm past 2 weeks Not at all       Requested Prescriptions   Pending Prescriptions Disp Refills    albuterol (PROAIR HFA/PROVENTIL HFA/VENTOLIN HFA) 108 (90 Base) MCG/ACT inhaler [Pharmacy Med Name: ALBUTEROL HFA INH(200 PUFFS) 18GM] 18 g      Sig: INHALE 2 PUFFS BY MOUTH EVERY 6 HOURS AS NEEDED FOR SHORTNESS OF BREATH OR DIFFICULT BREATHING OR WHEEZING       Asthma Maintenance Inhalers - Anticholinergics Failed - 6/9/2023 11:18 AM        Failed - Asthma control assessment score within normal limits in last 6 months     Please review ACT score.       12/27/2022     4:22 PM   ACT Total Scores   ACT TOTAL SCORE (Goal Greater than or Equal to 20) 11   In the past 12 months, how many times did you visit the emergency room for your asthma without being admitted to the hospital? 0   In the past 12 months, how many times were you hospitalized overnight because of your asthma? 0             Passed - Patient is age 12 years or older        Passed - Medication is active on med list        Passed - Recent (6 mo) or future (30 days) visit within the authorizing provider's specialty     Patient had office visit in the last 6 months or has a visit in the next 30 days with authorizing provider or within the authorizing provider's specialty.  See \"Patient Info\" tab in inbasket, or \"Choose Columns\" in Meds & Orders section of the refill encounter.           Short-Acting Beta Agonist Inhalers Protocol  Failed - 6/9/2023 11:18 AM        Failed - Asthma control assessment score within " "normal limits in last 6 months     Please review ACT score.           Passed - Patient is age 12 or older        Passed - Medication is active on med list        Passed - Recent (6 mo) or future (30 days) visit within the authorizing provider's specialty     Patient had office visit in the last 6 months or has a visit in the next 30 days with authorizing provider or within the authorizing provider's specialty.  See \"Patient Info\" tab in inbasket, or \"Choose Columns\" in Meds & Orders section of the refill encounter.                    Moraima Delgado RN on 6/9/2023 at 12:18 PM    "

## 2023-06-23 DIAGNOSIS — J45.40 MODERATE PERSISTENT ASTHMA WITHOUT COMPLICATION: ICD-10-CM

## 2023-06-23 RX ORDER — ALBUTEROL SULFATE 90 UG/1
AEROSOL, METERED RESPIRATORY (INHALATION)
Qty: 8.5 G | Refills: 0 | Status: SHIPPED | OUTPATIENT
Start: 2023-06-23 | End: 2023-07-10
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
## 2023-07-07 DIAGNOSIS — J45.40 MODERATE PERSISTENT ASTHMA WITHOUT COMPLICATION: ICD-10-CM

## 2023-07-07 NOTE — TELEPHONE ENCOUNTER
"Pending Prescriptions:                       Disp   Refills    albuterol (PROAIR HFA/PROVENTIL HFA/VENTOL*8.5 g  0        Sig: INHALE 2 PUFFS BY MOUTH EVERY 6 HOURS AS NEEDED FOR           SHORTNESS OF BREATH OR DIFFICULT BREATHING OR           WHEEZING    Routing refill request to provider for review/approval because:  10/31/2018 6/17/2019 10/24/2019 10/14/2020 9/21/2021 3/28/2022 12/27/2022                        ACT and ATAQ Scores   ACT TOTAL SCORE (Goal Greater than or Equal to 20) 23 20 23 23 22 16 11     Needs visit    Requested Prescriptions   Pending Prescriptions Disp Refills    albuterol (PROAIR HFA/PROVENTIL HFA/VENTOLIN HFA) 108 (90 Base) MCG/ACT inhaler [Pharmacy Med Name: ALBUTEROL HFA INH (200 PUFFS) 8.5GM] 8.5 g 0     Sig: INHALE 2 PUFFS BY MOUTH EVERY 6 HOURS AS NEEDED FOR SHORTNESS OF BREATH OR DIFFICULT BREATHING OR WHEEZING       Asthma Maintenance Inhalers - Anticholinergics Failed - 7/7/2023  2:55 PM        Failed - Asthma control assessment score within normal limits in last 6 months     Please review ACT score.           Failed - Recent (6 mo) or future (30 days) visit within the authorizing provider's specialty     Patient had office visit in the last 6 months or has a visit in the next 30 days with authorizing provider or within the authorizing provider's specialty.  See \"Patient Info\" tab in inbasket, or \"Choose Columns\" in Meds & Orders section of the refill encounter.            Passed - Patient is age 12 years or older        Passed - Medication is active on med list       Short-Acting Beta Agonist Inhalers Protocol  Failed - 7/7/2023  2:55 PM        Failed - Asthma control assessment score within normal limits in last 6 months     Please review ACT score.           Failed - Recent (6 mo) or future (30 days) visit within the authorizing provider's specialty     Patient had office visit in the last 6 months or has a visit in the next 30 days with authorizing provider or within the " "authorizing provider's specialty.  See \"Patient Info\" tab in inbasket, or \"Choose Columns\" in Meds & Orders section of the refill encounter.            Passed - Patient is age 12 or older        Passed - Medication is active on med list             "

## 2023-07-10 ENCOUNTER — MYC MEDICAL ADVICE (OUTPATIENT)
Dept: FAMILY MEDICINE | Facility: OTHER | Age: 48
End: 2023-07-10
Payer: COMMERCIAL

## 2023-07-10 DIAGNOSIS — J45.40 MODERATE PERSISTENT ASTHMA WITHOUT COMPLICATION: ICD-10-CM

## 2023-07-10 RX ORDER — ALBUTEROL SULFATE 90 UG/1
AEROSOL, METERED RESPIRATORY (INHALATION)
Qty: 8.5 G | Refills: 0 | OUTPATIENT
Start: 2023-07-10

## 2023-07-10 RX ORDER — ALBUTEROL SULFATE 90 UG/1
AEROSOL, METERED RESPIRATORY (INHALATION)
Qty: 8.5 G | Refills: 0 | Status: SHIPPED | OUTPATIENT
Start: 2023-07-10 | End: 2023-07-27
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
## 2023-07-10 NOTE — TELEPHONE ENCOUNTER
Has requested several albuterol inhalers since his visit in December. His asthma is not well controlled. He should schedule with me or his PCP to discuss.     Yeny Guerra PA-C

## 2023-07-10 NOTE — TELEPHONE ENCOUNTER
RN Triage    Patient Contact    Attempt # 1    Was call answered?  No.  Left message on voicemail with information to call me back.  Sent i-markerhart.   YEISON Henderson, RN, PHN  [place]  July 10, 2023

## 2023-07-10 NOTE — TELEPHONE ENCOUNTER
"Pending Prescriptions:                       Disp   Refills    albuterol (PROAIR HFA/PROVENTIL HFA/VENTOL*8.5 g  0          Routing refill request to provider for review/approval because:  Labs out of range:  ACT  Future appointment scheduled    PHQ-9 score:        10/24/2019     9:58 AM   PHQ   PHQ-9 Total Score 4   Q9: Thoughts of better off dead/self-harm past 2 weeks Not at all       Requested Prescriptions   Pending Prescriptions Disp Refills    albuterol (PROAIR HFA/PROVENTIL HFA/VENTOLIN HFA) 108 (90 Base) MCG/ACT inhaler 8.5 g 0       Asthma Maintenance Inhalers - Anticholinergics Failed - 7/10/2023 10:25 AM        Failed - Asthma control assessment score within normal limits in last 6 months     Please review ACT score.           Failed - Recent (6 mo) or future (30 days) visit within the authorizing provider's specialty     Patient had office visit in the last 6 months or has a visit in the next 30 days with authorizing provider or within the authorizing provider's specialty.  See \"Patient Info\" tab in inbasket, or \"Choose Columns\" in Meds & Orders section of the refill encounter.            Passed - Patient is age 12 years or older        Passed - Medication is active on med list       Short-Acting Beta Agonist Inhalers Protocol  Failed - 7/10/2023 10:25 AM        Failed - Asthma control assessment score within normal limits in last 6 months     Please review ACT score.           Failed - Recent (6 mo) or future (30 days) visit within the authorizing provider's specialty     Patient had office visit in the last 6 months or has a visit in the next 30 days with authorizing provider or within the authorizing provider's specialty.  See \"Patient Info\" tab in inbasket, or \"Choose Columns\" in Meds & Orders section of the refill encounter.            Passed - Patient is age 12 or older        Passed - Medication is active on med list                Moraima Delgado RN on 7/10/2023 at 10:28 AM    "

## 2023-07-12 ENCOUNTER — TELEPHONE (OUTPATIENT)
Dept: FAMILY MEDICINE | Facility: OTHER | Age: 48
End: 2023-07-12
Payer: COMMERCIAL

## 2023-07-12 NOTE — TELEPHONE ENCOUNTER
Prior Authorization Retail Medication Request    Medication/Dose: fluticasone-salmeterol (ADVAIR HFA) 115-21 MCG/ACT inhaler  ICD code (if different than what is on RX):  [J45.40]   Previously Tried and Failed:    Rationale:      Insurance Name:    Insurance ID:        Pharmacy Information (if different than what is on RX)  [id number] [address] AT [facility] [address]  Phone:  [phone]    Pt. Was told he has 5 refills left of his Advair. Attempted to  and pharmacist told patient this was not being covered by insurance. Patient was told to request prior auth.

## 2023-07-17 NOTE — TELEPHONE ENCOUNTER
Central Prior Authorization Team   Phone: [phone]      PA Initiation    Medication: FLUTICASONE-SALMETEROL 115-21 MCG/ACT IN AERO  Insurance Company: [employer] - Phone [phone] Fax [phone]  Pharmacy Filling the Rx: [id number] - [place], [place] - [address] AT [facility] [address]  Filling Pharmacy Phone: [phone]  Filling Pharmacy Fax:    Start Date: 7/17/2023      Started PA on CMM and a response of Drug is covered by current benefit plan. No further PA activity needed.  Called pharmacy, they switched to Brand and received a paid claim.  **Instructed pharmacy to notify patient when script is ready to /ship.**

## 2023-07-27 DIAGNOSIS — J45.40 MODERATE PERSISTENT ASTHMA WITHOUT COMPLICATION: ICD-10-CM

## 2023-07-27 RX ORDER — ALBUTEROL SULFATE 90 UG/1
AEROSOL, METERED RESPIRATORY (INHALATION)
Qty: 18 G | Refills: 0 | Status: SHIPPED | OUTPATIENT
Start: 2023-07-27 | End: 2023-08-22

## 2023-07-27 NOTE — TELEPHONE ENCOUNTER
Patient going out of town, looking for this to be filled by tomorrow if possible. Please call him once sent.

## 2023-07-27 NOTE — TELEPHONE ENCOUNTER
Left message to patient that script was sent and last refill until he is seen.     Appointments in Next Year      Aug 29, 2023  7:40 AM  (Arrive by 7:20 AM)  Provider Visit with Yeny Guerra PA-C  [facility] ([facility] ) [phone]          Jana LATHAM, RN  [facility]

## 2023-08-22 DIAGNOSIS — J45.40 MODERATE PERSISTENT ASTHMA WITHOUT COMPLICATION: ICD-10-CM

## 2023-08-22 RX ORDER — ALBUTEROL SULFATE 90 UG/1
AEROSOL, METERED RESPIRATORY (INHALATION)
Qty: 18 G | Refills: 0 | Status: SHIPPED | OUTPATIENT
Start: 2023-08-22 | End: 2023-09-19

## 2023-08-29 ENCOUNTER — TELEPHONE (OUTPATIENT)
Dept: FAMILY MEDICINE | Facility: OTHER | Age: 48
End: 2023-08-29

## 2023-08-29 NOTE — TELEPHONE ENCOUNTER
Attempted to contact patient with message below but unable to leave a message due to patients mailbox being full.    Sharon Russo MA

## 2023-08-29 NOTE — TELEPHONE ENCOUNTER
Please call patient. He was a No Show for his appointment today. His asthma has not been well controlled.  His Albuterol that was refilled on 08/22/2023 will be the last refill until he is seen for follow-up.     Please reschedule, ok to use in a same day/approval request slot but needs to be F2F.        Yeny Guerra PA-C

## 2023-09-12 ENCOUNTER — DOCUMENTATION ONLY (OUTPATIENT)
Dept: FAMILY MEDICINE | Facility: OTHER | Age: 48
End: 2023-09-12
Payer: COMMERCIAL

## 2023-09-12 ENCOUNTER — TELEPHONE (OUTPATIENT)
Dept: FAMILY MEDICINE | Facility: OTHER | Age: 48
End: 2023-09-12
Payer: COMMERCIAL

## 2023-09-12 DIAGNOSIS — Z13.220 SCREENING FOR HYPERLIPIDEMIA: ICD-10-CM

## 2023-09-12 DIAGNOSIS — Z13.1 SCREENING FOR DIABETES MELLITUS: ICD-10-CM

## 2023-09-12 DIAGNOSIS — J45.40 MODERATE PERSISTENT ASTHMA WITHOUT COMPLICATION: Primary | ICD-10-CM

## 2023-09-12 DIAGNOSIS — Z12.5 SCREENING FOR PROSTATE CANCER: ICD-10-CM

## 2023-09-12 NOTE — TELEPHONE ENCOUNTER
Pt asking if you can put in iron lab order. Has appt tomorrow for lab and has appt scheduled for next Tuesday with you. He apologizes for missing last appt but he has a work emergency.     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

## 2023-09-12 NOTE — PROGRESS NOTES
Please place new lab orders or sign pended lab orders. Patient has an appointment to get labs done and we need signed lab orders.    Thank you

## 2023-09-13 ENCOUNTER — LAB (OUTPATIENT)
Dept: LAB | Facility: OTHER | Age: 48
End: 2023-09-13
Payer: COMMERCIAL

## 2023-09-13 DIAGNOSIS — Z13.220 SCREENING FOR HYPERLIPIDEMIA: ICD-10-CM

## 2023-09-13 DIAGNOSIS — J45.40 MODERATE PERSISTENT ASTHMA WITHOUT COMPLICATION: ICD-10-CM

## 2023-09-13 DIAGNOSIS — Z12.5 SCREENING FOR PROSTATE CANCER: ICD-10-CM

## 2023-09-13 DIAGNOSIS — Z13.1 SCREENING FOR DIABETES MELLITUS: ICD-10-CM

## 2023-09-13 LAB
ALBUMIN SERPL BCG-MCNC: 4.6 G/DL (ref 3.5–5.2)
ALP SERPL-CCNC: 57 U/L (ref 40–129)
ALT SERPL W P-5'-P-CCNC: 37 U/L (ref 0–70)
ANION GAP SERPL CALCULATED.3IONS-SCNC: 11 MMOL/L (ref 7–15)
AST SERPL W P-5'-P-CCNC: 27 U/L (ref 0–45)
BILIRUB SERPL-MCNC: 0.6 MG/DL
BUN SERPL-MCNC: 14.3 MG/DL (ref 6–20)
CALCIUM SERPL-MCNC: 9.4 MG/DL (ref 8.6–10)
CHLORIDE SERPL-SCNC: 103 MMOL/L (ref 98–107)
CHOLEST SERPL-MCNC: 221 MG/DL
CREAT SERPL-MCNC: 0.92 MG/DL (ref 0.67–1.17)
DEPRECATED HCO3 PLAS-SCNC: 26 MMOL/L (ref 22–29)
EGFRCR SERPLBLD CKD-EPI 2021: >90 ML/MIN/1.73M2
ERYTHROCYTE [DISTWIDTH] IN BLOOD BY AUTOMATED COUNT: 11.8 % (ref 10–15)
FERRITIN SERPL-MCNC: 517 NG/ML (ref 31–409)
GLUCOSE SERPL-MCNC: 115 MG/DL (ref 70–99)
HBA1C MFR BLD: 5.4 % (ref 0–5.6)
HCT VFR BLD AUTO: 48.6 % (ref 40–53)
HDLC SERPL-MCNC: 71 MG/DL
HGB BLD-MCNC: 16.6 G/DL (ref 13.3–17.7)
IRON BINDING CAPACITY (ROCHE): 326 UG/DL (ref 240–430)
IRON SATN MFR SERPL: 50 % (ref 15–46)
IRON SERPL-MCNC: 162 UG/DL (ref 61–157)
LDLC SERPL CALC-MCNC: 119 MG/DL
MCH RBC QN AUTO: 34.5 PG (ref 26.5–33)
MCHC RBC AUTO-ENTMCNC: 34.2 G/DL (ref 31.5–36.5)
MCV RBC AUTO: 101 FL (ref 78–100)
NONHDLC SERPL-MCNC: 150 MG/DL
PLATELET # BLD AUTO: 194 10E3/UL (ref 150–450)
POTASSIUM SERPL-SCNC: 4.5 MMOL/L (ref 3.4–5.3)
PROT SERPL-MCNC: 7.3 G/DL (ref 6.4–8.3)
PSA SERPL DL<=0.01 NG/ML-MCNC: 0.83 NG/ML (ref 0–3.5)
RBC # BLD AUTO: 4.81 10E6/UL (ref 4.4–5.9)
SODIUM SERPL-SCNC: 140 MMOL/L (ref 136–145)
TRIGL SERPL-MCNC: 155 MG/DL
WBC # BLD AUTO: 5.8 10E3/UL (ref 4–11)

## 2023-09-13 PROCEDURE — 82728 ASSAY OF FERRITIN: CPT

## 2023-09-13 PROCEDURE — 85027 COMPLETE CBC AUTOMATED: CPT

## 2023-09-13 PROCEDURE — 83550 IRON BINDING TEST: CPT

## 2023-09-13 PROCEDURE — 36415 COLL VENOUS BLD VENIPUNCTURE: CPT

## 2023-09-13 PROCEDURE — G0103 PSA SCREENING: HCPCS

## 2023-09-13 PROCEDURE — 80061 LIPID PANEL: CPT

## 2023-09-13 PROCEDURE — 83540 ASSAY OF IRON: CPT

## 2023-09-13 PROCEDURE — 83036 HEMOGLOBIN GLYCOSYLATED A1C: CPT

## 2023-09-13 PROCEDURE — 80053 COMPREHEN METABOLIC PANEL: CPT

## 2023-09-19 ENCOUNTER — ANCILLARY PROCEDURE (OUTPATIENT)
Dept: GENERAL RADIOLOGY | Facility: OTHER | Age: 48
End: 2023-09-19
Attending: PHYSICIAN ASSISTANT
Payer: COMMERCIAL

## 2023-09-19 ENCOUNTER — PATIENT OUTREACH (OUTPATIENT)
Dept: ONCOLOGY | Facility: CLINIC | Age: 48
End: 2023-09-19

## 2023-09-19 ENCOUNTER — OFFICE VISIT (OUTPATIENT)
Dept: FAMILY MEDICINE | Facility: OTHER | Age: 48
End: 2023-09-19
Payer: COMMERCIAL

## 2023-09-19 VITALS
RESPIRATION RATE: 16 BRPM | DIASTOLIC BLOOD PRESSURE: 82 MMHG | OXYGEN SATURATION: 96 % | TEMPERATURE: 97.9 F | HEIGHT: 69 IN | BODY MASS INDEX: 29.25 KG/M2 | WEIGHT: 197.5 LBS | SYSTOLIC BLOOD PRESSURE: 124 MMHG | HEART RATE: 107 BPM

## 2023-09-19 DIAGNOSIS — R79.89 ELEVATED TSH: ICD-10-CM

## 2023-09-19 DIAGNOSIS — J45.40 MODERATE PERSISTENT ASTHMA WITHOUT COMPLICATION: Primary | ICD-10-CM

## 2023-09-19 DIAGNOSIS — R10.31 RLQ ABDOMINAL PAIN: ICD-10-CM

## 2023-09-19 DIAGNOSIS — R79.89 HIGH SERUM FERRITIN: ICD-10-CM

## 2023-09-19 LAB
T4 FREE SERPL-MCNC: 1.21 NG/DL (ref 0.9–1.7)
TSH SERPL DL<=0.005 MIU/L-ACNC: 4.49 UIU/ML (ref 0.3–4.2)

## 2023-09-19 PROCEDURE — 84443 ASSAY THYROID STIM HORMONE: CPT | Performed by: PHYSICIAN ASSISTANT

## 2023-09-19 PROCEDURE — 74019 RADEX ABDOMEN 2 VIEWS: CPT | Mod: TC | Performed by: RADIOLOGY

## 2023-09-19 PROCEDURE — 84439 ASSAY OF FREE THYROXINE: CPT | Performed by: PHYSICIAN ASSISTANT

## 2023-09-19 PROCEDURE — 36415 COLL VENOUS BLD VENIPUNCTURE: CPT | Performed by: PHYSICIAN ASSISTANT

## 2023-09-19 PROCEDURE — 99214 OFFICE O/P EST MOD 30 MIN: CPT | Performed by: PHYSICIAN ASSISTANT

## 2023-09-19 RX ORDER — FLUTICASONE PROPIONATE AND SALMETEROL XINAFOATE 115; 21 UG/1; UG/1
2 AEROSOL, METERED RESPIRATORY (INHALATION) 2 TIMES DAILY
Qty: 12 G | Refills: 5 | Status: CANCELLED | OUTPATIENT
Start: 2023-09-19

## 2023-09-19 RX ORDER — ALBUTEROL SULFATE 90 UG/1
AEROSOL, METERED RESPIRATORY (INHALATION)
Qty: 18 G | Refills: 5 | Status: SHIPPED | OUTPATIENT
Start: 2023-09-19 | End: 2023-11-03

## 2023-09-19 RX ORDER — MONTELUKAST SODIUM 10 MG/1
10 TABLET ORAL AT BEDTIME
Qty: 90 TABLET | Refills: 1 | Status: SHIPPED | OUTPATIENT
Start: 2023-09-19 | End: 2024-02-06

## 2023-09-19 ASSESSMENT — ASTHMA QUESTIONNAIRES: ACT_TOTALSCORE: 12

## 2023-09-19 ASSESSMENT — PAIN SCALES - GENERAL: PAINLEVEL: MILD PAIN (2)

## 2023-09-19 NOTE — PROGRESS NOTES
Assessment & Plan     Moderate persistent asthma without complication  Still not well controlled but hasn't been using Advair as prescribed. He is going to try to be more consistent with this inhaler and if still not getting control then would like to increase the dose.   Continue Albuterol PRN.   Will start on Montelukast since he has more allergies and those trigger his asthma. Also encouraged him to use daily oral antihistamine as he hasn't been.   Encouraged to quit smoking. He has been able to for 15 days and is very random with his smoking most days.  He denies needing help with this at this time.   - albuterol (PROAIR HFA/PROVENTIL HFA/VENTOLIN HFA) 108 (90 Base) MCG/ACT inhaler; INHALE 2 PUFFS BY MOUTH EVERY 6 HOURS AS NEEDED FOR SHORTNESS OF BREATH OR DIFFICULT BREATHING OR WHEEZING  - montelukast (SINGULAIR) 10 MG tablet; Take 1 tablet (10 mg) by mouth At Bedtime    High serum ferritin  This is chronic, level of ferritin is relatively stable. Did see Hemtology in 2019, was doing phlebotomy at that time x 2.  They felt it was alcohol related but he has significantly reduced alcohol intake. Recommended repeat visit with hematology  as the previous one wanted to see him back in 6 months. Not certain this is causing any of his concerns.   - Adult Oncology/Hematology  Referral; Future    Elevated TSH  Rechecking, if still high then recommend starting on Levothyroxine, this can cause his fatigue, constipation, depression, etc.   - TSH with free T4 reflex; Future  - TSH with free T4 reflex    RLQ abdominal pain  His abdominal symptoms seem separate from his ferritin concerns.  No concerns on examination.  Question constipation, colitis, hernia, varicocele or hydrocele.  Waiting on final report but no immediate concerns. Consider bowel regimen to see if this reduces his symptoms.   - XR Abdomen 2 Views; Future      Nicotine/Tobacco Cessation:  He reports that he has been smoking cigarettes. He has never  used smokeless tobacco.  Nicotine/Tobacco Cessation Plan:   Declines needing assistance at this time.     Options for treatment and follow-up care were reviewed with the patient and/or guardian. Patient and/or guardian engaged in the decision making process and verbalized understanding of the options discussed and agreed with the final plan.    KIRILL Garay [facility] SELENA Covarrubias is a 48 year old, presenting for the following health issues:  Asthma        9/19/2023    11:34 AM   Additional Questions   Roomed by gricel   Accompanied by negra         9/19/2023    11:34 AM   Patient Reported Additional Medications   Patient reports taking the following new medications none   He has a hard time getting into the regime of 2 puffs in the morning and 2 puffs at night with the purple inhaler. He has been using it twice a day for the last 1 to 2 weeks. It does help him. He quit smoking for a while which helped a lot, but he fell off the wagon a little bit. The amount he smokes is variable - with is girlfriend nothing, with friends and drinking more.     He is leaving on a week-long horseback trip tomorrow morning and he is allergic to horses and hay. He does not take anything for allergies. Once he gets going in the morning, drinks a cup of coffee and starts moving, he can work through it and then he is good to go again. He took it last night and this morning. He has been traveling a lot. He denies any chest pain. He denies any bruising.     His alcohol intake varies. Sometimes, he drinks 2 days a week. Sometimes, he might go to the bar and have a beer. Other times, he drinks more in a sitting but this is much more rare. He has cut back on his alcohol intake. He has tried phlebotomy twice for high iron. He has been feeling bad and has zero energy. He generally gets 8 hours of sleep every night. He has sleep apnea. He does not use CPAP when he is at [facility] because he does not  sleep well. When he did take the test, he was told that he was very borderline. The only reason he got it was because he snores. Even when he got down to 175 lbs, he still snores. He thinks it is because his jaws recessed back. He is not sure if his insurance will cover a mouth guard.    He feels like his right lower abdomen is swollen inside on and off. He denies any groin swelling. This has been going on for about 6 months. It is not painful, but sometimes it is uncomfortable because he is in his vehicle a lot.     History of Present Illness       Reason for visit:  Asthma    He eats 0-1 servings of fruits and vegetables daily.He consumes 0 sweetened beverage(s) daily.He exercises with enough effort to increase his heart rate 9 or less minutes per day.  He exercises with enough effort to increase his heart rate 3 or less days per week.   He is taking medications regularly.     The 10-year ASCVD risk score (Ben NGUYEN, et al., 2019) is: 5.8%    Values used to calculate the score:      Age: 48 years      Sex: Male      Is Non- : No      Diabetic: No      Tobacco smoker: Yes      Systolic Blood Pressure: 124 mmHg      Is BP treated: No      HDL Cholesterol: 71 mg/dL      Total Cholesterol: 221 mg/dL  Asthma Follow-Up    Was ACT completed today?  Yes        9/19/2023    11:34 AM   ACT Total Scores   ACT TOTAL SCORE (Goal Greater than or Equal to 20) 12   In the past 12 months, how many times did you visit the emergency room for your asthma without being admitted to the hospital? 0   In the past 12 months, how many times were you hospitalized overnight because of your asthma? 0       How many days per week do you miss taking your asthma controller medication?  3  Please describe any recent triggers for your asthma: pollens, animal dander, and aspirin  Have you had any Emergency Room Visits, Urgent Care Visits, or Hospital Admissions since your last office visit?  No          Review of Systems  "  Constitutional, HEENT, cardiovascular, pulmonary, GI, , musculoskeletal, neuro, skin, endocrine and psych systems are negative, except as otherwise noted.      Objective    /82   Pulse 107   Temp 97.9  F (36.6  C) (Temporal)   Resp 16   Ht 1.75 m (5' 8.9\")   Wt 89.6 kg (197 lb 8 oz)   SpO2 96%   BMI 29.25 kg/m    Body mass index is 29.25 kg/m .  Physical Exam   GENERAL: healthy, alert and no distress  NECK: no adenopathy, no asymmetry, masses, or scars and thyroid normal to palpation  RESP: Occasional rhonchi in upper lung fields bilaterally otherwise clear to auscultation.   CV: regular rate and rhythm, normal S1 S2, no S3 or S4, no murmur, click or rub, no peripheral edema and peripheral pulses strong  ABDOMEN: soft, nontender, no hepatosplenomegaly, no masses and bowel sounds normal   (male): normal male genitalia without lesions or urethral discharge, no hernia  MS: no gross musculoskeletal defects noted, no edema    Lab on 09/13/2023   Component Date Value Ref Range Status    Iron 09/13/2023 162 (H)  61 - 157 ug/dL Final    Iron Binding Capacity 09/13/2023 326  240 - 430 ug/dL Final    Iron Sat Index 09/13/2023 50 (H)  15 - 46 % Final    Cholesterol 09/13/2023 221 (H)  <200 mg/dL Final    Triglycerides 09/13/2023 155 (H)  <150 mg/dL Final    Direct Measure HDL 09/13/2023 71  >=40 mg/dL Final    LDL Cholesterol Calculated 09/13/2023 119 (H)  <=100 mg/dL Final    Non HDL Cholesterol 09/13/2023 150 (H)  <130 mg/dL Final    Hemoglobin A1C 09/13/2023 5.4  0.0 - 5.6 % Final    Normal <5.7%   Prediabetes 5.7-6.4%    Diabetes 6.5% or higher     Note: Adopted from ADA consensus guidelines.    Ferritin 09/13/2023 517 (H)  31 - 409 ng/mL Final    Sodium 09/13/2023 140  136 - 145 mmol/L Final    Potassium 09/13/2023 4.5  3.4 - 5.3 mmol/L Final    Chloride 09/13/2023 103  98 - 107 mmol/L Final    Carbon Dioxide (CO2) 09/13/2023 26  22 - 29 mmol/L Final    Anion Gap 09/13/2023 11  7 - 15 mmol/L Final    " Urea Nitrogen 09/13/2023 14.3  6.0 - 20.0 mg/dL Final    Creatinine 09/13/2023 0.92  0.67 - 1.17 mg/dL Final    Calcium 09/13/2023 9.4  8.6 - 10.0 mg/dL Final    Glucose 09/13/2023 115 (H)  70 - 99 mg/dL Final    Alkaline Phosphatase 09/13/2023 57  40 - 129 U/L Final    AST 09/13/2023 27  0 - 45 U/L Final    Reference intervals for this test were updated on 6/12/2023 to more accurately reflect our healthy population. There may be differences in the flagging of prior results with similar values performed with this method. Interpretation of those prior results can be made in the context of the updated reference intervals.    ALT 09/13/2023 37  0 - 70 U/L Final    Reference intervals for this test were updated on 6/12/2023 to more accurately reflect our healthy population. There may be differences in the flagging of prior results with similar values performed with this method. Interpretation of those prior results can be made in the context of the updated reference intervals.      Protein Total 09/13/2023 7.3  6.4 - 8.3 g/dL Final    Albumin 09/13/2023 4.6  3.5 - 5.2 g/dL Final    Bilirubin Total 09/13/2023 0.6  <=1.2 mg/dL Final    GFR Estimate 09/13/2023 >90  >60 mL/min/1.73m2 Final    WBC Count 09/13/2023 5.8  4.0 - 11.0 10e3/uL Final    RBC Count 09/13/2023 4.81  4.40 - 5.90 10e6/uL Final    Hemoglobin 09/13/2023 16.6  13.3 - 17.7 g/dL Final    Hematocrit 09/13/2023 48.6  40.0 - 53.0 % Final    MCV 09/13/2023 101 (H)  78 - 100 fL Final    MCH 09/13/2023 34.5 (H)  26.5 - 33.0 pg Final    MCHC 09/13/2023 34.2  31.5 - 36.5 g/dL Final    RDW 09/13/2023 11.8  10.0 - 15.0 % Final    Platelet Count 09/13/2023 194  150 - 450 10e3/uL Final    Prostate Specific Antigen Screen 09/13/2023 0.83  0.00 - 3.50 ng/mL Final     Results for orders placed or performed in visit on 09/19/23   XR Abdomen 2 Views     Status: None (Preliminary result)    Narrative    ABDOMEN 2 VIEWS   9/19/2023 12:16 PM     HISTORY: RLQ abdominal  pain.    COMPARISON: None.      Impression    IMPRESSION: Nonobstructive bowel. No free air. No specific acute  abnormality can be seen.

## 2023-09-19 NOTE — PROGRESS NOTES
Hematology referral reviewed for [facility], see below.    Referral reason: Elevated ferritin, liver values WNL currently    Current abnormal labs: Available in Chart Review    Outreach: [facility] sent to patient    Plan: Triage instructions updated and sent to NPS for completion.

## 2023-09-19 NOTE — LETTER
My Asthma Action Plan    Name: Raffaele Guzman   YOB: 1975  Date: 9/19/2023   My doctor: Yeny Guerra PA-C   My clinic: [facility]        My Control Medicine: Fluticasone propionate (Flovent HFA) - 110 mcg 2 puffs BID  My Rescue Medicine: Albuterol (Proair/Ventolin/Proventil HFA) 2-4 puffs EVERY 4 HOURS as needed. Use a spacer if recommended by your provider.   My Asthma Severity:   Moderate Persistent  Know your asthma triggers:     pollens  animal dander  aspirin            GREEN ZONE   Good Control  I feel good  No cough or wheeze  Can work, sleep and play without asthma symptoms       Take your asthma control medicine every day.     If exercise triggers your asthma, take your rescue medication  15 minutes before exercise or sports, and  During exercise if you have asthma symptoms  Spacer to use with inhaler: If you have a spacer, make sure to use it with your inhaler             YELLOW ZONE Getting Worse  I have ANY of these:  I do not feel good  Cough or wheeze  Chest feels tight  Wake up at night   Keep taking your Green Zone medications  Start taking your rescue medicine:  every 20 minutes for up to 1 hour. Then every 4 hours for 24-48 hours.  If you stay in the Yellow Zone for more than 12-24 hours, contact your doctor.  If you do not return to the Green Zone in 12-24 hours or you get worse, start taking your oral steroid medicine if prescribed by your provider.           RED ZONE Medical Alert - Get Help  I have ANY of these:  I feel awful  Medicine is not helping  Breathing getting harder  Trouble walking or talking  Nose opens wide to breathe       Take your rescue medicine NOW  If your provider has prescribed an oral steroid medicine, start taking it NOW  Call your doctor NOW  If you are still in the Red Zone after 20 minutes and you have not reached your doctor:  Take your rescue medicine again and  Call 911 or go to the emergency room right away    See your  regular doctor within 2 weeks of an Emergency Room or Urgent Care visit for follow-up treatment.          Annual Reminders:  Meet with Asthma Educator,  Flu Shot in the Fall, consider Pneumonia Vaccination for patients with asthma (aged 19 and older).    Pharmacy: [id number]  [place] [facility] [id number] AT [facility] OF  [address]    Electronically signed by Yeny Guerra PA-C   Date: 09/19/23                      Asthma Triggers  How To Control Things That Make Your Asthma Worse    Triggers are things that make your asthma worse.  Look at the list below to help you find your triggers and what you can do about them.  You can help prevent asthma flare-ups by staying away from your triggers.      Trigger                                                          What you can do   Cigarette Smoke  Tobacco smoke can make asthma worse. Do not allow smoking in your home, car or around you.  Be sure no one smokes at a child s day care or school.  If you smoke, ask your health care provider for ways to help you quit.  Ask family members to quit too.  Ask your health care provider for a referral to Quit Plan to help you quit smoking, or call 7-127-650-PLAN.     Colds, Flu, Bronchitis  These are common triggers of asthma. Wash your hands often.  Don t touch your eyes, nose or mouth.  Get a flu shot every year.     Dust Mites  These are tiny bugs that live in cloth or carpet. They are too small to see. Wash sheets and blankets in hot water every week.   Encase pillows and mattress in dust mite proof covers.  Avoid having carpet if you can. If you have carpet, vacuum weekly.   Use a dust mask and HEPA vacuum.   Pollen and Outdoor Mold  Some people are allergic to trees, grass, or weed pollen, or molds. Try to keep your windows closed.  Limit time out doors when pollen count is high.   Ask you health care provider about taking medicine during allergy season.     Animal Dander  Some people are allergic to  skin flakes, urine or saliva from pets with fur or feathers. Keep pets with fur or feathers out of your home.    If you can t keep the pet outdoors, then keep the pet out of your bedroom.  Keep the bedroom door closed.  Keep pets off cloth furniture and away from stuffed toys.     Mice, Rats, and Cockroaches   Some people are allergic to the waste from these pests.   Cover food and garbage.  Clean up spills and food crumbs.  Store grease in the refrigerator.   Keep food out of the bedroom.   Indoor Mold  This can be a trigger if your home has high moisture. Fix leaking faucets, pipes, or other sources of water.   Clean moldy surfaces.  Dehumidify basement if it is damp and smelly.   Smoke, Strong Odors, and Sprays  These can reduce air quality. Stay away from strong odors and sprays, such as perfume, powder, hair spray, paints, smoke incense, paint, cleaning products, candles and new carpet.   Exercise or Sports  Some people with asthma have this trigger. Be active!  Ask your doctor about taking medicine before sports or exercise to prevent symptoms.    Warm up for 5-10 minutes before and after sports or exercise.     Other Triggers of Asthma  Cold air:  Cover your nose and mouth with a scarf.  Sometimes laughing or crying can be a trigger.  Some medicines and food can trigger asthma.

## 2023-09-21 NOTE — TELEPHONE ENCOUNTER
RECORDS STATUS - ALL OTHER DIAGNOSIS    High serum ferritin   RECORDS RECEIVED FROM:    Appt Date: 10/6/2023 with Dr. Schmitz   NOTES STATUS DETAILS   OFFICE NOTE from referring provider Epic 09/19/23: Yeny Guerra PA-C     OFFICE NOTE from medical oncologist [facility] 03/22/19: Dr. Kortney Banks   MEDICATION LIST [facility]    LABS     PATHOLOGY REPORTS     ANYTHING RELATED TO DIAGNOSIS Epic Most recent 09/19/23

## 2023-10-06 ENCOUNTER — PRE VISIT (OUTPATIENT)
Dept: ONCOLOGY | Facility: CLINIC | Age: 48
End: 2023-10-06
Payer: COMMERCIAL

## 2023-10-18 NOTE — PROGRESS NOTES
Hematology/Medical Oncology Consultation Note      ADDENDUM: Left message with patient regarding excess of iron on liver MR scan as well as negative hepatitis B serologies.    Recommend therapeutic phlebotomy once monthly for 6 months then reassess.  I acknowledge the patient has had problems with phlebotomy in the past probably related to a negative pressure phlebotomy kits which caused the blood the veins to collapse.  I have discussed this matter with the infusion staff here and they will probably insert a regular IV and have the blood remove the gravity which is slower but is likely to avoid the problems the patient has experienced in the past.  I explained this manage to the patient on the voicemail by phone.    Follow-up with provider in 6 months after monthly phlebotomy.    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    Impression:  Elevated ferritin secondary to multiple factors including heterozygous C282Y hemochromatosis gene mutation, chronic alcohol consumption, and overweight.    Recommendation, plan, instructions:  MR liver protocol scan to assess liver iron burden  Hepatitis B surface antigen and hepatitis B core antibody  Discussed interplay between hemochromatosis, obesity, alcohol consumption, chronic inflammatory liver disease and impact on hepatic iron accumulation and organ toxicity.  Heterozygous C282Y hemochromatosis does not usually cause clinical iron overload unless there are other compounding factors.  I will call back with test results and discuss what to do next.  Unfortunately, he had a poor experience with phlebotomy related to low phlebotomy blood flow which can be a chronic and persistent problem.  The best solution might be weight  reduction, marked alcohol restriction and serial testing.  I do not think oral chelator therapy in this setting would be indicated.    Time with patient including review, documentation, history, examination, coordination of care and counseling was 25 minutes.    History of present illness:  On 9/19/2022, the patient was seen by his PCP for persistent mild asthma and a history of elevated serum ferritin seen back in 2018 by hematologist, Dr. Kortney Banks of [facility]  for which he was phlebotomized twice and for which the condition was felt related to alcohol.  Following 2 phlebotomies he stopped the phlebotomies since he had very poor flow and very little blood able to be withdrawn.  Hemochromatosis gene studies revealed that he had heterozygous C282Y mutation.      In October, 2019, the ferritin was 526 and a 9/13/2020 ferritin was 517.  The serum iron was 162, TIBC 326 and saturation 50%.  [facility] serum ferritins were 345 to 410 between August and December, 2018.    A 9/13/2023 ferritin was 517 with a otherwise unremarkable CMP and CBC except for a MCV of 101.    His consumption of alcohol has varied quite a bit from perhaps 2 drinks a week to quite a bit more at one sitting although his overall intake has diminished.  However, looking over a whole month's time, he probably averages 2 drinks daily    Past medical history:  Past Medical History:   Diagnosis Date    Allergy to mold spores     4/16/13 RAST pos. for: DM/M/G/RW    Diagnostic skin and sensitization tests 4/16/13 RAST pos. for: DM/M/G/RW    House dust mite allergy     Hypovitaminosis D     noted 4/16/13, put on Rx    Moderate persistent asthma     Nasal polyp     noted on left side.    ALBERTO (obstructive sleep apnea)     Seasonal allergic conjunctivitis     Seasonal allergic rhinitis          Family history:  I have reviewed this patient's family history and updated it with pertinent information if needed.  Family History   Problem Relation Age of  "Onset    Cancer Father     Diabetes No family hx of     Hyperlipidemia No family hx of     Hypertension No family hx of     Other Cancer No family hx of          Medications:  Current Outpatient Medications   Medication    albuterol (PROAIR HFA/PROVENTIL HFA/VENTOLIN HFA) 108 (90 Base) MCG/ACT inhaler    fluticasone-salmeterol (ADVAIR HFA) 115-21 MCG/ACT inhaler    multivitamin w/minerals (THERA-VIT-M) tablet    montelukast (SINGULAIR) 10 MG tablet     No current facility-administered medications for this visit.       Allergies:  Allergies   Allergen Reactions    Aspirin Unknown     As a kid       Physical examination:  BP (!) 163/96 (BP Location: Right arm, Patient Position: Sitting, Cuff Size: Adult Regular)   Pulse 81   Temp 98.3  F (36.8  C) (Tympanic)   Resp 16   Ht 1.753 m (5' 9\")   Wt 92.1 kg (203 lb)   SpO2 99%   BMI 29.98 kg/m      The patient is alert and oriented.    Jonel Schmitz MD            "

## 2023-10-26 ENCOUNTER — LAB (OUTPATIENT)
Dept: LAB | Facility: CLINIC | Age: 48
End: 2023-10-26

## 2023-10-26 ENCOUNTER — ONCOLOGY VISIT (OUTPATIENT)
Dept: ONCOLOGY | Facility: CLINIC | Age: 48
End: 2023-10-26
Attending: PHYSICIAN ASSISTANT
Payer: COMMERCIAL

## 2023-10-26 VITALS
TEMPERATURE: 98.3 F | RESPIRATION RATE: 16 BRPM | SYSTOLIC BLOOD PRESSURE: 163 MMHG | HEART RATE: 81 BPM | DIASTOLIC BLOOD PRESSURE: 96 MMHG | BODY MASS INDEX: 30.07 KG/M2 | HEIGHT: 69 IN | OXYGEN SATURATION: 99 % | WEIGHT: 203 LBS

## 2023-10-26 DIAGNOSIS — E83.19 IRON OVERLOAD: ICD-10-CM

## 2023-10-26 DIAGNOSIS — E83.111 IRON OVERLOAD DUE TO REPEATED RED BLOOD CELL TRANSFUSIONS: ICD-10-CM

## 2023-10-26 DIAGNOSIS — R79.89 HIGH SERUM FERRITIN: ICD-10-CM

## 2023-10-26 DIAGNOSIS — E83.19 IRON OVERLOAD: Primary | ICD-10-CM

## 2023-10-26 PROCEDURE — 99213 OFFICE O/P EST LOW 20 MIN: CPT | Performed by: INTERNAL MEDICINE

## 2023-10-26 PROCEDURE — 87340 HEPATITIS B SURFACE AG IA: CPT | Performed by: INTERNAL MEDICINE

## 2023-10-26 PROCEDURE — 36415 COLL VENOUS BLD VENIPUNCTURE: CPT

## 2023-10-26 PROCEDURE — 86704 HEP B CORE ANTIBODY TOTAL: CPT | Performed by: INTERNAL MEDICINE

## 2023-10-26 PROCEDURE — 99204 OFFICE O/P NEW MOD 45 MIN: CPT | Performed by: INTERNAL MEDICINE

## 2023-10-26 ASSESSMENT — PAIN SCALES - GENERAL: PAINLEVEL: NO PAIN (0)

## 2023-10-26 NOTE — PROGRESS NOTES
"Oncology Rooming Note    October 26, 2023 3:41 PM   Raffaele Guzman is a 48 year old male who presents for:    Chief Complaint   Patient presents with    Hematology     High serum ferritin - New consult     Initial Vitals: BP (!) 163/96 (BP Location: Right arm, Patient Position: Sitting, Cuff Size: Adult Regular)   Pulse 81   Temp 98.3  F (36.8  C) (Tympanic)   Resp 16   Ht 1.753 m (5' 9\")   Wt 92.1 kg (203 lb)   SpO2 99%   BMI 29.98 kg/m   Estimated body mass index is 29.98 kg/m  as calculated from the following:    Height as of this encounter: 1.753 m (5' 9\").    Weight as of this encounter: 92.1 kg (203 lb). Body surface area is 2.12 meters squared.  No Pain (0) Comment: Data Unavailable   No LMP for male patient.  Allergies reviewed: Yes  Medications reviewed: Yes    Medications: Medication refills not needed today.  Pharm[facility] into [id number] [id number] AT [facility] OF  [address]    Clinical concerns:  None      Rupa Mullen CMA              "

## 2023-10-26 NOTE — LETTER
10/26/2023         RE: Raffaele Guzman  [address] [address] [address]        Dear Colleague,    Thank you for referring your patient, Raffaele Guzman, to the [facility]. Please see a copy of my visit note below.    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    Impression:  Elevated ferritin secondary to multiple factors including heterozygous C282Y hemochromatosis gene mutation, chronic alcohol consumption, and overweight.    Recommendation, plan, instructions:  MR liver protocol scan to assess liver iron burden  Hepatitis B surface antigen and hepatitis B core antibody  Discussed interplay between hemochromatosis, obesity, alcohol consumption, chronic inflammatory liver disease and impact on hepatic iron accumulation and organ toxicity.  Heterozygous C282Y hemochromatosis does not usually cause clinical iron overload unless there are other compounding factors.  I will call back with test results and discuss what to do next.  Unfortunately, he had a poor experience with phlebotomy related to low phlebotomy blood flow which can be a chronic and persistent problem.  The best solution might be weight reduction, marked alcohol restriction and serial testing.  I do not think oral chelator therapy in this setting would be indicated.    Time with patient including review, documentation, history, examination, coordination of care and counseling was 25 minutes.    History of present illness:  On 9/19/2022, the patient was seen by his PCP for persistent mild asthma and a history of elevated serum ferritin seen back in 2018 by hematologist, Dr. Kortney Banks of [facility]  for which he was phlebotomized twice and  for which the condition was felt related to alcohol.  Following 2 phlebotomies he stopped the phlebotomies since he had very poor flow and very little blood able to be withdrawn.  Hemochromatosis gene studies revealed that he had heterozygous C282Y mutation.      In October, 2019, the ferritin was 526 and a 9/13/2020 ferritin was 517.  The serum iron was 162, TIBC 326 and saturation 50%.  [facility] serum ferritins were 345 to 410 between August and December, 2018.    A 9/13/2023 ferritin was 517 with a otherwise unremarkable CMP and CBC except for a MCV of 101.    His consumption of alcohol has varied quite a bit from perhaps 2 drinks a week to quite a bit more at one sitting although his overall intake has diminished.  However, looking over a whole month's time, he probably averages 2 drinks daily    Past medical history:  Past Medical History:   Diagnosis Date     Allergy to mold spores     4/16/13 RAST pos. for: DM/M/G/RW     Diagnostic skin and sensitization tests 4/16/13 RAST pos. for: DM/M/G/RW     House dust mite allergy      Hypovitaminosis D     noted 4/16/13, put on Rx     Moderate persistent asthma      Nasal polyp     noted on left side.     ALBERTO (obstructive sleep apnea)      Seasonal allergic conjunctivitis      Seasonal allergic rhinitis          Family history:  I have reviewed this patient's family history and updated it with pertinent information if needed.  Family History   Problem Relation Age of Onset     Cancer Father      Diabetes No family hx of      Hyperlipidemia No family hx of      Hypertension No family hx of      Other Cancer No family hx of          Medications:  Current Outpatient Medications   Medication     albuterol (PROAIR HFA/PROVENTIL HFA/VENTOLIN HFA) 108 (90 Base) MCG/ACT inhaler     fluticasone-salmeterol (ADVAIR HFA) 115-21 MCG/ACT inhaler     multivitamin w/minerals (THERA-VIT-M) tablet     montelukast (SINGULAIR) 10 MG tablet     No current facility-administered  "medications for this visit.       Allergies:  Allergies   Allergen Reactions     Aspirin Unknown     As a kid       Physical examination:  BP (!) 163/96 (BP Location: Right arm, Patient Position: Sitting, Cuff Size: Adult Regular)   Pulse 81   Temp 98.3  F (36.8  C) (Tympanic)   Resp 16   Ht 1.753 m (5' 9\")   Wt 92.1 kg (203 lb)   SpO2 99%   BMI 29.98 kg/m      The patient is alert and oriented.    Jonel Schmitz MD              Oncology Rooming Note    October 26, 2023 3:41 PM   Raffaele Guzman is a 48 year old male who presents for:    Chief Complaint   Patient presents with     Hematology     High serum ferritin - New consult     Initial Vitals: BP (!) 163/96 (BP Location: Right arm, Patient Position: Sitting, Cuff Size: Adult Regular)   Pulse 81   Temp 98.3  F (36.8  C) (Tympanic)   Resp 16   Ht 1.753 m (5' 9\")   Wt 92.1 kg (203 lb)   SpO2 99%   BMI 29.98 kg/m   Estimated body mass index is 29.98 kg/m  as calculated from the following:    Height as of this encounter: 1.753 m (5' 9\").    Weight as of this encounter: 92.1 kg (203 lb). Body surface area is 2.12 meters squared.  No Pain (0) Comment: Data Unavailable   No LMP for male patient.  Allergies reviewed: Yes  Medications reviewed: Yes    Medications: Medication refills not needed today.  Pharmacy [facility] entered into [facility]: [id number] [address] AT [facility] [address]  [address]    Clinical concerns:  None      Rupa Mullen [facility]                Again, thank you for allowing me to participate in the care of your patient.        Sincerely,        Jonel Schmitz MD  "

## 2023-10-26 NOTE — PATIENT INSTRUCTIONS
1. Blood tests today  2. MR liver--iron protocol  3. Dr. Schmitz will call with results and what to do next

## 2023-10-27 ENCOUNTER — PATIENT OUTREACH (OUTPATIENT)
Dept: ONCOLOGY | Facility: CLINIC | Age: 48
End: 2023-10-27
Payer: COMMERCIAL

## 2023-10-27 LAB
HBV CORE AB SERPL QL IA: NONREACTIVE
HBV SURFACE AG SERPL QL IA: NONREACTIVE

## 2023-11-02 DIAGNOSIS — J45.40 MODERATE PERSISTENT ASTHMA WITHOUT COMPLICATION: ICD-10-CM

## 2023-11-03 RX ORDER — ALBUTEROL SULFATE 90 UG/1
AEROSOL, METERED RESPIRATORY (INHALATION)
Qty: 8.5 G | Refills: 0 | Status: SHIPPED | OUTPATIENT
Start: 2023-11-03 | End: 2024-02-06

## 2023-12-19 ENCOUNTER — HOSPITAL ENCOUNTER (OUTPATIENT)
Dept: MRI IMAGING | Facility: CLINIC | Age: 48
Discharge: HOME | End: 2023-12-19
Attending: INTERNAL MEDICINE | Admitting: INTERNAL MEDICINE
Payer: COMMERCIAL

## 2023-12-19 DIAGNOSIS — E83.111 IRON OVERLOAD DUE TO REPEATED RED BLOOD CELL TRANSFUSIONS: ICD-10-CM

## 2023-12-19 DIAGNOSIS — E83.19 IRON OVERLOAD: ICD-10-CM

## 2023-12-19 PROCEDURE — 74181 MRI ABDOMEN W/O CONTRAST: CPT

## 2023-12-19 PROCEDURE — 74181 MRI ABDOMEN W/O CONTRAST: CPT | Mod: 26 | Performed by: RADIOLOGY

## 2023-12-20 PROBLEM — E83.19 IRON OVERLOAD: Status: ACTIVE | Noted: 2023-12-20

## 2023-12-22 ENCOUNTER — TELEPHONE (OUTPATIENT)
Dept: ONCOLOGY | Facility: CLINIC | Age: 48
End: 2023-12-22
Payer: COMMERCIAL

## 2023-12-26 ENCOUNTER — TELEPHONE (OUTPATIENT)
Dept: ONCOLOGY | Facility: CLINIC | Age: 48
End: 2023-12-26
Payer: COMMERCIAL

## 2023-12-26 NOTE — TELEPHONE ENCOUNTER
Left patient 2nd voicemail following up to schedule monthly phlebotomy per IB request. -Alla FRANKLIN

## 2023-12-28 ENCOUNTER — MYC MEDICAL ADVICE (OUTPATIENT)
Dept: ONCOLOGY | Facility: CLINIC | Age: 48
End: 2023-12-28
Payer: COMMERCIAL

## 2023-12-28 NOTE — TELEPHONE ENCOUNTER
----- Message from Alla Josette sent at 12/28/2023 10:26 AM CST -----  Regarding: RE: Please schedule ASAP  Good morning!    I have left 2 voicemail's for this patient and also sent a Virdiahart message with no response. Please let me know if there's anything additional I can do to assist with this.    Thanks!  Alla Rhodes   [facility]  ----- Message -----  From: Magda Jain  Sent: 12/22/2023  10:33 AM CST  To: Andra Del Valle, RN; #  Subject: Please schedule ASAP                             Called pt and he is requesting to go to [facility]. Please schedule him for 6mo of Phlebs and then with a provider visit after 6mo of Phlebs.    Thank you,   Magda    ----- Message -----  From: Andra Del Valle RN  Sent: 12/20/2023   9:20 AM CST  To: [facility]     [facility],  Please see attached message per Dr. Schmitz and call to schedule this patient for monthly phlebs/labs and then a 6 month follow up appt with Gabi and labs/possible phleb.  Thanks,  Tena  ----- Message -----  From: Jonel Schmitz MD  Sent: 12/20/2023   8:48 AM CST  To: ESAU Gutierrez-    Please see my addendum. Needs monthly phlebotomy.  He has had problems with negative pressure phlebotomy kits.  I talk with the infusion staff today and they feel confident they can get the blood removed using irregular IV set using gravity.    He will need to see a provider in 6 months after 6 monthly phlebotomies to reassess.  Therapeutic phlebotomy orders were submitted and signed.    Dionisio

## 2024-01-17 RX ORDER — HEPARIN SODIUM (PORCINE) LOCK FLUSH IV SOLN 100 UNIT/ML 100 UNIT/ML
5 SOLUTION INTRAVENOUS
Status: CANCELLED | OUTPATIENT
Start: 2024-01-17

## 2024-01-17 RX ORDER — HEPARIN SODIUM,PORCINE 10 UNIT/ML
5-20 VIAL (ML) INTRAVENOUS DAILY PRN
Status: CANCELLED | OUTPATIENT
Start: 2024-01-17

## 2024-01-18 ENCOUNTER — LAB (OUTPATIENT)
Dept: INFUSION THERAPY | Facility: CLINIC | Age: 49
End: 2024-01-18
Attending: NURSE PRACTITIONER
Payer: COMMERCIAL

## 2024-01-18 ENCOUNTER — INFUSION THERAPY VISIT (OUTPATIENT)
Dept: INFUSION THERAPY | Facility: CLINIC | Age: 49
End: 2024-01-18
Attending: INTERNAL MEDICINE
Payer: COMMERCIAL

## 2024-01-18 VITALS
TEMPERATURE: 98 F | OXYGEN SATURATION: 98 % | BODY MASS INDEX: 29.64 KG/M2 | WEIGHT: 200.7 LBS | HEART RATE: 85 BPM | DIASTOLIC BLOOD PRESSURE: 90 MMHG | RESPIRATION RATE: 18 BRPM | SYSTOLIC BLOOD PRESSURE: 142 MMHG

## 2024-01-18 DIAGNOSIS — R79.89 HIGH SERUM FERRITIN: Primary | ICD-10-CM

## 2024-01-18 DIAGNOSIS — E83.19 IRON OVERLOAD: ICD-10-CM

## 2024-01-18 LAB
BASOPHILS # BLD AUTO: 0 10E3/UL (ref 0–0.2)
BASOPHILS NFR BLD AUTO: 1 %
EOSINOPHIL # BLD AUTO: 0.2 10E3/UL (ref 0–0.7)
EOSINOPHIL NFR BLD AUTO: 3 %
ERYTHROCYTE [DISTWIDTH] IN BLOOD BY AUTOMATED COUNT: 11.8 % (ref 10–15)
FERRITIN SERPL-MCNC: 682 NG/ML (ref 31–409)
HCT VFR BLD AUTO: 50 % (ref 40–53)
HGB BLD-MCNC: 17.3 G/DL (ref 13.3–17.7)
IMM GRANULOCYTES # BLD: 0 10E3/UL
IMM GRANULOCYTES NFR BLD: 0 %
LYMPHOCYTES # BLD AUTO: 3 10E3/UL (ref 0.8–5.3)
LYMPHOCYTES NFR BLD AUTO: 41 %
MCH RBC QN AUTO: 34.3 PG (ref 26.5–33)
MCHC RBC AUTO-ENTMCNC: 34.6 G/DL (ref 31.5–36.5)
MCV RBC AUTO: 99 FL (ref 78–100)
MONOCYTES # BLD AUTO: 0.6 10E3/UL (ref 0–1.3)
MONOCYTES NFR BLD AUTO: 8 %
NEUTROPHILS # BLD AUTO: 3.5 10E3/UL (ref 1.6–8.3)
NEUTROPHILS NFR BLD AUTO: 47 %
NRBC # BLD AUTO: 0 10E3/UL
NRBC BLD AUTO-RTO: 0 /100
PLATELET # BLD AUTO: 206 10E3/UL (ref 150–450)
RBC # BLD AUTO: 5.04 10E6/UL (ref 4.4–5.9)
WBC # BLD AUTO: 7.3 10E3/UL (ref 4–11)

## 2024-01-18 PROCEDURE — 36415 COLL VENOUS BLD VENIPUNCTURE: CPT

## 2024-01-18 PROCEDURE — 99207 PR NO CHARGE LOS: CPT

## 2024-01-18 PROCEDURE — 99195 PHLEBOTOMY: CPT

## 2024-01-18 PROCEDURE — 85004 AUTOMATED DIFF WBC COUNT: CPT

## 2024-01-18 PROCEDURE — 82728 ASSAY OF FERRITIN: CPT

## 2024-01-18 RX ORDER — HEPARIN SODIUM (PORCINE) LOCK FLUSH IV SOLN 100 UNIT/ML 100 UNIT/ML
5 SOLUTION INTRAVENOUS
Status: CANCELLED | OUTPATIENT
Start: 2024-02-01

## 2024-01-18 RX ORDER — HEPARIN SODIUM,PORCINE 10 UNIT/ML
5-20 VIAL (ML) INTRAVENOUS DAILY PRN
Status: CANCELLED | OUTPATIENT
Start: 2024-02-01

## 2024-01-18 NOTE — PROGRESS NOTES
"This is the pts first time to [facility]. Orientation provided to infusion center, pt also instructed on how and when to use her call light. Questions answered to pt satisfaction. HTN today, pt states it is normally not an issue for him. He states he has a PCP visit coming up this month, advised to monitor blood pressures at home and report readings to PCP, patient verbalized understanding. Also states vision has been worsening over the last few months, on and off, advised him to notify his provider of this as well. He states he will be trying to get in to see an eye doctor soon.       Prior to scheduled phlebotomy verified patient identity using patient's name and date of birth.  Lab(s) verified: Hgb 17.3 Ferritin 682    Parameters: Proceed with phlebotomy if hemoglobin more than 11 and last ferritin more than 50.  Volume to be removed: 500 mL.     Peripheral Access:  Accessed: RAC* with 20ga autoguard instyte catheter without difficulty.  Positive blood return.  No redness, edema or complaints of discomfort.  Pt tolerated phlebotomy, 500 mls of blood removed via gravity using whole blood collection bag and scale per MD's order.  500 mls of oral fluid replaced.  Pt tolerated procedure without adverse reactions.  Pt denies headache, nausea or discomfort.  IV flushed with 10mls 0.9% Normal Saline after completion of procedure and discontinued per policy with pressure dressing applied.      Pre Vital Signs including O2 saturation documented in \"Vitals\"  Post Vital Signs: see flowsheets    Reviewed and provided discharge instructions regarding therapeutic phlebotomy.  Pt verbalized understanding.    Reviewed appointments and copy of schedule given to pt.  Return to clinic in: 1 month. Educated patient to stop at scheduling following infusion.       Ambulation steady.  Pt alert and orientated upon discharge.          "

## 2024-02-02 ENCOUNTER — OFFICE VISIT (OUTPATIENT)
Dept: OPTOMETRY | Facility: CLINIC | Age: 49
End: 2024-02-02
Payer: COMMERCIAL

## 2024-02-02 DIAGNOSIS — H04.123 DRY EYE SYNDROME OF BOTH EYES: ICD-10-CM

## 2024-02-02 DIAGNOSIS — H52.13 MYOPIA OF BOTH EYES: ICD-10-CM

## 2024-02-02 DIAGNOSIS — H02.889 MEIBOMIAN GLAND DYSFUNCTION: ICD-10-CM

## 2024-02-02 DIAGNOSIS — H43.813 POSTERIOR VITREOUS DETACHMENT OF BOTH EYES: ICD-10-CM

## 2024-02-02 DIAGNOSIS — H52.4 PRESBYOPIA: ICD-10-CM

## 2024-02-02 DIAGNOSIS — Z01.00 EXAMINATION OF EYES AND VISION: Primary | ICD-10-CM

## 2024-02-02 DIAGNOSIS — H52.223 REGULAR ASTIGMATISM OF BOTH EYES: ICD-10-CM

## 2024-02-02 PROCEDURE — 92015 DETERMINE REFRACTIVE STATE: CPT | Performed by: OPTOMETRIST

## 2024-02-02 PROCEDURE — 92004 COMPRE OPH EXAM NEW PT 1/>: CPT | Performed by: OPTOMETRIST

## 2024-02-02 RX ORDER — OLOPATADINE HYDROCHLORIDE 2 MG/ML
1 SOLUTION/ DROPS OPHTHALMIC DAILY
Qty: 2.5 ML | Refills: 11 | Status: CANCELLED | OUTPATIENT
Start: 2024-02-02 | End: 2025-02-01

## 2024-02-02 ASSESSMENT — VISUAL ACUITY
OD_PH_SC+: -3
OD_SC: 20/70-1
OS_PH_SC: 20/25
OD_SC+: -1
OS_SC: 20/100
OS_PH_SC+: -2
OS_SC: 20/20
METHOD: SNELLEN - LINEAR
OD_SC: 20/40
OD_PH_SC: 20/25

## 2024-02-02 ASSESSMENT — REFRACTION_MANIFEST
OD_CYLINDER: +0.25
METHOD_AUTOREFRACTION: 1
OD_AXIS: 170
OS_CYLINDER: +0.50
OS_ADD: +2.00
OS_SPHERE: -1.75
OS_AXIS: 171
OD_SPHERE: -1.25
OD_ADD: +2.00

## 2024-02-02 ASSESSMENT — KERATOMETRY
OD_AXISANGLE_DEGREES: 161
OD_AXISANGLE2_DEGREES: 071
OS_AXISANGLE2_DEGREES: 081
OS_AXISANGLE_DEGREES: 171
OD_K2POWER_DIOPTERS: 43.50
OD_K1POWER_DIOPTERS: 42.75
OS_K2POWER_DIOPTERS: 43.25
OS_K1POWER_DIOPTERS: 42.75

## 2024-02-02 ASSESSMENT — CONF VISUAL FIELD
OD_SUPERIOR_NASAL_RESTRICTION: 0
OS_SUPERIOR_TEMPORAL_RESTRICTION: 0
OD_SUPERIOR_TEMPORAL_RESTRICTION: 0
OS_NORMAL: 1
OS_INFERIOR_NASAL_RESTRICTION: 0
OD_NORMAL: 1
OD_INFERIOR_TEMPORAL_RESTRICTION: 0
OD_INFERIOR_NASAL_RESTRICTION: 0
OS_INFERIOR_TEMPORAL_RESTRICTION: 0
OS_SUPERIOR_NASAL_RESTRICTION: 0

## 2024-02-02 ASSESSMENT — CUP TO DISC RATIO
OS_RATIO: 0.2
OD_RATIO: 0.3

## 2024-02-02 ASSESSMENT — TONOMETRY
OD_IOP_MMHG: 18
IOP_METHOD: TONOPEN
OS_IOP_MMHG: 19

## 2024-02-02 ASSESSMENT — EXTERNAL EXAM - LEFT EYE: OS_EXAM: ROSACEA

## 2024-02-02 ASSESSMENT — EXTERNAL EXAM - RIGHT EYE: OD_EXAM: ROSACEA

## 2024-02-02 ASSESSMENT — SLIT LAMP EXAM - LIDS
COMMENTS: MEIBOMIAN GLAND DYSFUNCTION
COMMENTS: MEIBOMIAN GLAND DYSFUNCTION

## 2024-02-02 NOTE — LETTER
2/2/2024         RE: Raffaele Guzman  [address] [address] [address]        Dear Colleague,    Thank you for referring your patient, Raffaele Guzman, to the [facility]. Please see a copy of my visit note below.    Chief Complaint   Patient presents with     Annual Eye Exam     Changes in vision near and far and blurry spots         Last Eye Exam: 30 years  Dilated Previously: Yes    What are you currently using to see?  does not use glasses or contacts       Distance Vision Acuity: Noticed gradual change in both eyes    Near Vision Acuity: Not satisfied with small print     Eye Comfort: good- no itchy eyes with allergies  Do you use eye drops? : No  Occupation or Hobbies: [employer]     Blurry spots in vision x 1 month,no flashes - moves with eye- maybe right?    Nadia Arredondo Optometric Assistant, A.B.O.C.      Medical, surgical and family histories reviewed and updated 2/2/2024.       OBJECTIVE: See Ophthalmology exam    ASSESSMENT:    ICD-10-CM    1. Examination of eyes and vision  Z01.00 EYE EXAM (SIMPLE-NONBILLABLE)      2. Myopia of both eyes  H52.13 REFRACTION      3. Regular astigmatism of both eyes  H52.223 REFRACTION      4. Presbyopia  H52.4 REFRACTION      5. Meibomian gland dysfunction  H02.889 EYE EXAM (SIMPLE-NONBILLABLE)      6. Dry eye syndrome of both eyes  H04.123 EYE EXAM (SIMPLE-NONBILLABLE)      7. Posterior vitreous detachment of both eyes  H43.813           PLAN:     Patient Instructions   Eyeglass prescription given.  Your options are a bifocal which would allow you to see distance and near vision or separate glasses for distance and reading.    Heat to the eyes daily for 10-15 minutes nightly with warm washcloth or reusable gel masks from the pharmacy or  Sofi heat masks can be purchased at Amazon.    Tea tree oil wipes or foam to cleanse eyelids/lashes in am and pm.    Artificial tears- 1 drop both eyes once before bedtime and once in the  morning then 2 x day as needed.      You have a PVD- posterior vitreous detachment which is due to the gel of the eye shrinking and clumping together.  This can sometimes cause holes or tears in the retina.  The signs of a retinal detachment are flashes of light or a curtain coming over the vision. If you notice any of these changes please let me know right away.  If I am not available this is an emergency type situation that you would need to be seen. I recommend the [facility] Emergency Room or call [phone].    Return in 1 year for a complete eye exam or sooner if needed.    Ron Linn, OD                 Again, thank you for allowing me to participate in the care of your patient.        Sincerely,        Ron Linn, OD

## 2024-02-02 NOTE — PATIENT INSTRUCTIONS
Eyeglass prescription given.  Your options are a bifocal which would allow you to see distance and near vision or separate glasses for distance and reading.    Heat to the eyes daily for 10-15 minutes nightly with warm washcloth or reusable gel masks from the pharmacy or  [employer] heat masks can be purchased at Amazon.    Tea tree oil wipes or foam to cleanse eyelids/lashes in am and pm.    Artificial tears- 1 drop both eyes once before bedtime and once in the morning then 2 x day as needed.      You have a PVD- posterior vitreous detachment which is due to the gel of the eye shrinking and clumping together.  This can sometimes cause holes or tears in the retina.  The signs of a retinal detachment are flashes of light or a curtain coming over the vision. If you notice any of these changes please let me know right away.  If I am not available this is an emergency type situation that you would need to be seen. I recommend the [facility] Emergency Room or call [phone].    Return in 1 year for a complete eye exam or sooner if needed.    Ron Linn, OD    The affects of the dilating drops last for 4- 6 hours.  You will be more sensitive to light and vision will be blurry up close.  Do not drive if you do not feel comfortable.  Mydriatic sunglasses were given if needed.    There is a combination of three treatments which can greatly improve symptoms of dry eyes.     Artificial tears  Heat (eyes closed)  Eyelid and eyelash cleansing (eyes closed)     Use one drop of artificial tears both eyes 4 x daily.  Once in the morning, lunch, dinner and bedtime. Continue to use the drops regardless if your eyes are comfortable or not.  Artificial tears work best as a preventative and not as well after your eyes are starting to bother you.  It may take 4- 6 weeks of using the drops before you notice improvement.  If after that time you are still having problems schedule an appointment for an evaluation and  discussion of different treatments such as Restasis or Xiidra.  Dry eyes are a chronic condition and you may have more symptoms at certain times of the year.    Excess tearing can be due to the right tears not working properly or a blockage in the tear drainage system.  You can try using artificial tears 1 drop both eyes 4 x day.  If the excess tearing is bothersome after 4-6 weeks of treatment then we can send you for further testing.  This would entail a referral to our oculoplastic specialist Dr. Meron Rodriguez at the [facility]-[phone].    Recommended brands are:    Systane Complete  Systane Ultra  Systane Balance  Refresh Advanced Optive  Refresh Relieva  Blink    Recommended brands for contact lens wearers are:    Systane contacts  Refresh contacts  Blink contacts    If you are using drops more than 4 x day or have sensitivities to preservatives I recommend non preserved artificial tears.  These come in 1 use vials.  They can be used every 1-2 hours.  Do not reuse the vials.    Recommended brands are:    Refresh Optive Marek-3  Systane- preservative free  Refresh-  preservative free  Blink- preservative free    Gels or ointment can be used at night.    Recommended brands are:    Systane Gel  Refresh Gel  Blink Gel  Genteal Gel    Systane night time (ointment)  Refresh Celluvisc  Refresh PM (ointment)    Optase dry eye spray.  Spray to eyelids 3-4 x daily.  This can be purchased on Amazon.      Visine, Clear Eyes or Murine (drops that get the red out) can irritate the eyes and cause a rebound effect where the eyes become more red and you end up using more drops.  Avoid drops containing tetrahydrozoline, naphazoline, phenylephrine, oxymetazoline.      OTC Lumify is a newer product that gives immediate redness relief without the rebound effect.  Use as needed to take the redness out.    Artificial tears may be used with other drops (such as allergy, glaucoma, antibiotics) around the same time.  Be sure  to wait 5 minutes in between drops.    Heat to the eyelids can also improve your symptoms of dry eyes.  Sofi heat masks can be purchased at Amazon to be used nightly for 10-15 minutes.  Other options are gel masks that can be put in the microwave and purchased at most pharmacies.      Tea Tree Oil eyelid cleansers recommended are Ocusoft Oust foam cleanser to cleanse eyelids/lashes at night and in the am. Other options are Blephadex or Cliradex eyelid wipes.  KEEP EYES CLOSED when using these products.  These can be purchased on amazon.com   A good product for make up remover with tea tree oil is WeLoveEyes.  This can be found at www.[employer] or [employer].    Other good eyelid cleansers have hypochlorous which removes excess bacteria and is safe around the eyes. Products are Avenova, Ocusoft Hypochlor or Heyedrate. Spray solution onto cotton pad, close eyes and gently apply to eyelids and eyelashes using side to side motion.  You can also KEEP EYES CLOSED spray and rub into eyelashes.  You do not need to rinse it off. Use morning and evening. These products can be found on Amazon.  You can check with your local pharmacy and see if they can order if for you if they don't have it.    Other brands of eyelid cleansing wipes are:    Ocusoft wipes  Systane wipes    A great eye make up line is [email or web address]        Optometry Providers       Clinic Locations                                 Telephone Number   Dr. Lexy Tatum   [facility]  [place] [phone]     [facility] Hours:                [facility] Hours:       [facility] Hours:   [address]   [address]     [address] [place] [address]   [place] [address]    [place] [address]  Phone: [phone]                    Phone:  [phone]     Phone: [phone]                      Monday 8:00-6:00                          Monday 8:00-6:00                          Monday 8:00-6:00              Tuesday 8:00-6:00                          Tuesday 8:00-6:00                          Tuesday 8:00-6:00              Wednesday 8:00-6:00                  Wednesday 8:00-6:00                   Wednesday 8:00-6:00      Thursday 8:00-6:00                        Thursday 8:00-6:00                         Thursday 8:00-6:00            Friday 8:00-5:00                              Friday 8:00-5:00                              Friday 8:00-5:00    [place] [facility] Hours:   [address] [place] [address]  [phone]    Monday 9:00-6:00  Tuesday 9:00-6:00  Wednesday 9:00-6:00  Thursday 9:00-6:00  Friday 9:00-5:00  As always, Thank you for trusting us with your health care needs!

## 2024-02-02 NOTE — PROGRESS NOTES
Chief Complaint   Patient presents with    Annual Eye Exam     Changes in vision near and far and blurry spots         Last Eye Exam: 30 years  Dilated Previously: Yes    What are you currently using to see?  does not use glasses or contacts       Distance Vision Acuity: Noticed gradual change in both eyes    Near Vision Acuity: Not satisfied with small print     Eye Comfort: good- no itchy eyes with allergies  Do you use eye drops? : No  Occupation or Hobbies: [employer]     Blurry spots in vision x 1 month,no flashes - moves with eye- maybe right?    Nadia Arredondo Optometric Assistant, A.B.O.C.      Medical, surgical and family histories reviewed and updated 2/2/2024.       OBJECTIVE: See Ophthalmology exam    ASSESSMENT:    ICD-10-CM    1. Examination of eyes and vision  Z01.00 EYE EXAM (SIMPLE-NONBILLABLE)      2. Myopia of both eyes  H52.13 REFRACTION      3. Regular astigmatism of both eyes  H52.223 REFRACTION      4. Presbyopia  H52.4 REFRACTION      5. Meibomian gland dysfunction  H02.889 EYE EXAM (SIMPLE-NONBILLABLE)      6. Dry eye syndrome of both eyes  H04.123 EYE EXAM (SIMPLE-NONBILLABLE)      7. Posterior vitreous detachment of both eyes  H43.813           PLAN:     Patient Instructions   Eyeglass prescription given.  Your options are a bifocal which would allow you to see distance and near vision or separate glasses for distance and reading.    Heat to the eyes daily for 10-15 minutes nightly with warm washcloth or reusable gel masks from the pharmacy or  [employer] heat masks can be purchased at Amazon.    Tea tree oil wipes or foam to cleanse eyelids/lashes in am and pm.    Artificial tears- 1 drop both eyes once before bedtime and once in the morning then 2 x day as needed.      You have a PVD- posterior vitreous detachment which is due to the gel of the eye shrinking and clumping together.  This can sometimes cause holes or tears in the retina.  The signs of a retinal detachment are flashes of  light or a curtain coming over the vision. If you notice any of these changes please let me know right away.  If I am not available this is an emergency type situation that you would need to be seen. I recommend the [facility] Emergency Room or call [phone].    Return in 1 year for a complete eye exam or sooner if needed.    Ron Linn, OD

## 2024-02-06 ENCOUNTER — OFFICE VISIT (OUTPATIENT)
Dept: FAMILY MEDICINE | Facility: OTHER | Age: 49
End: 2024-02-06
Payer: COMMERCIAL

## 2024-02-06 VITALS
WEIGHT: 195 LBS | OXYGEN SATURATION: 96 % | TEMPERATURE: 98.4 F | HEART RATE: 96 BPM | RESPIRATION RATE: 16 BRPM | BODY MASS INDEX: 28.88 KG/M2 | DIASTOLIC BLOOD PRESSURE: 88 MMHG | HEIGHT: 69 IN | SYSTOLIC BLOOD PRESSURE: 134 MMHG

## 2024-02-06 DIAGNOSIS — Z00.00 ROUTINE GENERAL MEDICAL EXAMINATION AT A HEALTH CARE FACILITY: Primary | ICD-10-CM

## 2024-02-06 DIAGNOSIS — Z23 NEED FOR DIPHTHERIA-TETANUS-PERTUSSIS (TDAP) VACCINE: ICD-10-CM

## 2024-02-06 DIAGNOSIS — J45.40 MODERATE PERSISTENT ASTHMA WITHOUT COMPLICATION: ICD-10-CM

## 2024-02-06 DIAGNOSIS — R79.89 ELEVATED TSH: ICD-10-CM

## 2024-02-06 DIAGNOSIS — E83.19 IRON OVERLOAD: ICD-10-CM

## 2024-02-06 PROCEDURE — 90471 IMMUNIZATION ADMIN: CPT | Performed by: PHYSICIAN ASSISTANT

## 2024-02-06 PROCEDURE — 99396 PREV VISIT EST AGE 40-64: CPT | Mod: 25 | Performed by: PHYSICIAN ASSISTANT

## 2024-02-06 PROCEDURE — 90715 TDAP VACCINE 7 YRS/> IM: CPT | Performed by: PHYSICIAN ASSISTANT

## 2024-02-06 RX ORDER — ALBUTEROL SULFATE 90 UG/1
AEROSOL, METERED RESPIRATORY (INHALATION)
Qty: 18 G | Refills: 3 | Status: SHIPPED | OUTPATIENT
Start: 2024-02-06 | End: 2024-03-06

## 2024-02-06 RX ORDER — FLUTICASONE PROPIONATE AND SALMETEROL XINAFOATE 115; 21 UG/1; UG/1
2 AEROSOL, METERED RESPIRATORY (INHALATION) 2 TIMES DAILY
Qty: 12 G | Refills: 5 | Status: SHIPPED | OUTPATIENT
Start: 2024-02-06 | End: 2024-02-23

## 2024-02-06 RX ORDER — MONTELUKAST SODIUM 10 MG/1
10 TABLET ORAL AT BEDTIME
Qty: 90 TABLET | Refills: 1 | Status: SHIPPED | OUTPATIENT
Start: 2024-02-06 | End: 2024-09-06

## 2024-02-06 SDOH — HEALTH STABILITY: PHYSICAL HEALTH: ON AVERAGE, HOW MANY DAYS PER WEEK DO YOU ENGAGE IN MODERATE TO STRENUOUS EXERCISE (LIKE A BRISK WALK)?: 1 DAY

## 2024-02-06 SDOH — HEALTH STABILITY: PHYSICAL HEALTH: ON AVERAGE, HOW MANY MINUTES DO YOU ENGAGE IN EXERCISE AT THIS LEVEL?: 120 MIN

## 2024-02-06 ASSESSMENT — ASTHMA QUESTIONNAIRES
QUESTION_4 LAST FOUR WEEKS HOW OFTEN HAVE YOU USED YOUR RESCUE INHALER OR NEBULIZER MEDICATION (SUCH AS ALBUTEROL): TWO OR THREE TIMES PER WEEK
ACT_TOTALSCORE: 20
QUESTION_1 LAST FOUR WEEKS HOW MUCH OF THE TIME DID YOUR ASTHMA KEEP YOU FROM GETTING AS MUCH DONE AT WORK, SCHOOL OR AT HOME: NONE OF THE TIME
QUESTION_2 LAST FOUR WEEKS HOW OFTEN HAVE YOU HAD SHORTNESS OF BREATH: THREE TO SIX TIMES A WEEK
QUESTION_5 LAST FOUR WEEKS HOW WOULD YOU RATE YOUR ASTHMA CONTROL: WELL CONTROLLED
ACT_TOTALSCORE: 20
QUESTION_3 LAST FOUR WEEKS HOW OFTEN DID YOUR ASTHMA SYMPTOMS (WHEEZING, COUGHING, SHORTNESS OF BREATH, CHEST TIGHTNESS OR PAIN) WAKE YOU UP AT NIGHT OR EARLIER THAN USUAL IN THE MORNING: NOT AT ALL

## 2024-02-06 ASSESSMENT — PAIN SCALES - GENERAL: PAINLEVEL: NO PAIN (0)

## 2024-02-06 ASSESSMENT — SOCIAL DETERMINANTS OF HEALTH (SDOH): HOW OFTEN DO YOU GET TOGETHER WITH FRIENDS OR RELATIVES?: ONCE A WEEK

## 2024-02-06 NOTE — PROGRESS NOTES
Preventive Care Visit  [facility]  Yeny Guerra PA-C, Family Medicine  Feb 6, 2024    Assessment & Plan     Routine general medical examination at a health care facility  Need for diphtheria-tetanus-pertussis (Tdap) vaccine  Discussed smoking cessation, patient is planning on receiving laser therapy. Educated him on medical tobacco cessation options as well. He reports having more than 3 alcoholic drinks per day as well and states that he is currently working on cutting back on his alcohol intake.  Patient agreed to update Tdap today.  - TDAP 7+ (ADACEL,BOOSTRIX)    Moderate persistent asthma without complication  Patient reports no new concerns. Plan to continue medications as prescribed.  - fluticasone-salmeterol (ADVAIR HFA) 115-21 MCG/ACT inhaler; Inhale 2 puffs into the lungs 2 times daily  - albuterol (PROAIR HFA/PROVENTIL HFA/VENTOLIN HFA) 108 (90 Base) MCG/ACT inhaler; INHALE 2 PUFFS BY MOUTH EVERY 6 HOURS AS NEEDED FOR SHORTNESS OF BREATH OR DIFFICULT BREATHING OR WHEEZING  - montelukast (SINGULAIR) 10 MG tablet; Take 1 tablet (10 mg) by mouth at bedtime    Elevated TSH  TSH was elevated when checked 4 months ago. His T4 was normal indicating subclinical hypothyroidism. A [employer] message was sent regarding treatment if he is having symptoms but there was no reply. Plan to redraw TSH at his next lab draw.   - TSH with free T4 reflex; Future    Iron overload  Follows with Hematology, is undergoing a few clinic based phlebotomies but sounds like they struggled to get access this last time.       Counseling  Appropriate preventive services were discussed with this patient, including applicable screening as appropriate for fall prevention, nutrition, physical activity, Tobacco-use cessation, weight loss and cognition.  Checklist reviewing preventive services available has been given to the patient.  Reviewed patient's diet, addressing concerns and/or questions.   He is at risk for  lack of exercise and has been provided with information to increase physical activity for the benefit of his well-being.   The patient was instructed to see the dentist every 6 months.   The patient reports drinking more than one alcoholic drink per day and sometimes engages in binge or excessive drinking. The patient was counseled and given information about possible harmful effects of excessive alcohol intake as well as where to get help for alcohol problems.   Patient has been advised of split billing requirements and indicates understanding: Yes      Subjective   Satish is a 48 year old, presenting for the following:  Physical        2/6/2024     2:46 PM   Additional Questions   Roomed by DARIN   Accompanied by NA         2/6/2024     2:46 PM   Patient Reported Additional Medications   Patient reports taking the following new medications None        Health Care Directive  Patient does not have a Health Care Directive or Living Will: Did not discuss at this visit. Plan to discuss at next visit.    HPI  Here for annual physical. Patient wants to quit smoking and has been decreasing the amount of cigarettes he smokes per day with plans to receive laser therapy. We discussed medical options to assist with quitting smoking including nicotine replacement so that he is aware of the options we provide as well. We discussed his alcohol intake as he reports having around 3 alcoholic beverages a day. He states that he has been decreasing his alcohol intake recently.        2/6/2024   General Health   How would you rate your overall physical health? Good   Feel stress (tense, anxious, or unable to sleep) Not at all         2/6/2024   Nutrition   Three or more servings of calcium each day? (!) NO   Diet: Regular (no restrictions)   How many servings of fruit and vegetables per day? (!) 0-1   How many sweetened beverages each day? 0-1         2/6/2024   Exercise   Days per week of moderate/strenous exercise 1 day   Average minutes  spent exercising at this level 120 min   (!) EXERCISE CONCERN      2/6/2024   Social Factors   Frequency of gathering with friends or relatives Once a week   Worry food won't last until get money to buy more No   Food not last or not have enough money for food? No   Do you have housing?  Yes   Are you worried about losing your housing? No   Lack of transportation? No   Unable to get utilities (heat,electricity)? No         2/6/2024   Fall Risk   Fallen 2 or more times in the past year? No   Trouble with walking or balance? No          2/6/2024   Dental   Dentist two times every year? (!) NO         2/6/2024   TB Screening   Were you born outside of [place]?  No         Today's PHQ-2 Score:       2/6/2024     2:43 PM   PHQ-2 ( 1999 [employer])   Q1: Little interest or pleasure in doing things 0   Q2: Feeling down, depressed or hopeless 0   PHQ-2 Score 0   Q1: Little interest or pleasure in doing things Not at all   Q2: Feeling down, depressed or hopeless Not at all   PHQ-2 Score 0           2/6/2024   Substance Use   If I could quit smoking, I would Completely agree   I want to quit somking, worry about health affects Completely agree   Willing to make a plan to quit smoking Completely agree   Willing to cut down before quitting Completely agree   Alcohol more than 3/day or more than 7/wk Yes   How often do you have a drink containing alcohol 4 or more times a week   How many alcohol drinks on typical day 5 or 6   How often do you have 5+ drinks at one occasion Monthly   Audit 2/3 Score 4   How often not able to stop drinking once started Never   How often failed to do what normally expected Never   How often needed first drink in am after a heavy drinking session Never   How often feeling of guilt or remorse after drinking Never   How often unable to remember what happened the night before Never   Have you or someone else been injured because of your drinking Yes, but not in the last year   Has anyone been concerned or  "suggested you cut down on drinking Yes, during the last year   TOTAL SCORE - AUDIT 14   Do you use any other substances recreationally? (!) ALCOHOL     Social History     Tobacco Use    Smoking status: Some Days     Packs/day: 0     Types: Cigarettes     Last attempt to quit: 2003     Years since quittin.8     Passive exposure: Never    Smokeless tobacco: Never    Tobacco comments:     not everyday- can go months without, then may have a few   Vaping Use    Vaping Use: Never used   Substance Use Topics    Alcohol use: Yes     Comment: occ. use- per week use- varies 0-4    Drug use: No           2024   STI Screening   New sexual partner(s) since last STI/HIV test? No   The 10-year ASCVD risk score (Ben NGUYEN, et al., 2019) is: 6.6%    Values used to calculate the score:      Age: 48 years      Sex: Male      Is Non- : No      Diabetic: No      Tobacco smoker: Yes      Systolic Blood Pressure: 134 mmHg      Is BP treated: No      HDL Cholesterol: 71 mg/dL      Total Cholesterol: 221 mg/dL        2024   Contraception/Family Planning   Questions about contraception or family planning No        Reviewed and updated as needed this visit by Provider                    Past Medical History:   Diagnosis Date    Allergy to mold spores     13 RAST pos. for: DM/M/G/RW    Diagnostic skin and sensitization tests 13 RAST pos. for: DM/M/G/RW    House dust mite allergy     Hypovitaminosis D     noted 13, put on Rx    Moderate persistent asthma     Nasal polyp     noted on left side.    ALBERTO (obstructive sleep apnea)     Seasonal allergic conjunctivitis     Seasonal allergic rhinitis      Review of Systems    Review of Systems  Constitutional, HEENT, cardiovascular, pulmonary, gi and gu systems are negative, except as otherwise noted.     Objective    Exam  /88   Pulse 96   Temp 98.4  F (36.9  C) (Temporal)   Resp 16   Ht 1.753 m (5' 9.02\")   Wt 88.5 kg (195 lb)   " "SpO2 96%   BMI 28.78 kg/m     Estimated body mass index is 28.78 kg/m  as calculated from the following:    Height as of this encounter: 1.753 m (5' 9.02\").    Weight as of this encounter: 88.5 kg (195 lb).    Physical Exam  GENERAL: alert and no distress  EYES: Eyes grossly normal to inspection, PERRL and conjunctivae and sclerae normal  HENT: ear canals and TM's normal, nose and mouth without ulcers or lesions  NECK: no adenopathy, no asymmetry, masses, or scars  RESP: lungs clear to auscultation - no rales, rhonchi or wheezes  CV: regular rate and rhythm, normal S1 S2, no S3 or S4, no murmur, click or rub, no peripheral edema  ABDOMEN: soft, nontender, no hepatosplenomegaly, no masses and bowel sounds normal  MS: no gross musculoskeletal defects noted, no edema  SKIN: no suspicious lesions or rashes  NEURO: Normal strength and tone, mentation intact and speech normal  PSYCH: mentation appears normal, affect normal/bright      I, Yeny Guerra PA-C, was present with the Physician Assistant student who participated in the service and in the documentation of the note.  I have verified the history and personally performed the physical exam and medical decision making.  I agree with the assessment and plan of care as documented in the note.     Parts of this note were written by: LAURIE WilloughbyS2  Signed Electronically by: Yeny Guerra PA-C    "

## 2024-02-06 NOTE — PATIENT INSTRUCTIONS
"Eating Healthy Foods: Care Instructions  With every meal, you can make healthy food choices. Try to eat a variety of fruits, vegetables, whole grains, lean proteins, and low-fat dairy products. This can help you get the right balance of nutrients, including vitamins and minerals. Small changes add up over time. You can start by adding one healthy food to your meals each day.    Try to make half your plate fruits and vegetables, one-fourth whole grains, and one-fourth lean proteins. Try including dairy with your meals.   Eat more fruits and vegetables. Try to have them with most meals and snacks.   Foods for healthy eating    Fruits    These can be fresh, frozen, canned, or dried.  Try to choose whole fruit rather than fruit juice.  Eat a variety of colors.    Vegetables    These can be fresh, frozen, canned, or dried.  Beans, peas, and lentils count too.    Whole grains    Choose whole-grain breads, cereals, and noodles.  Try brown rice.    Lean proteins    These can include lean meat, poultry, fish, and eggs.  You can also have tofu, beans, peas, lentils, nuts, and seeds.    Dairy    Try milk, yogurt, and cheese.  Choose low-fat or fat-free when you can.  If you need to, use lactose-free milk or fortified plant-based milk products, such as soy milk.    Water    Drink water when you're thirsty.  Limit sugar-sweetened drinks, including soda, fruit drinks, and sports drinks.  Where can you learn more?  Go to https://www.[employer].net/patiented  Enter T756 in the search box to learn more about \"Eating Healthy Foods: Care Instructions.\"  Current as of: February 28, 2023               Content Version: 13.8    1286-4371 [employer].   Care instructions adapted under license by your healthcare professional. If you have questions about a medical condition or this instruction, always ask your healthcare professional. [employer] disclaims any warranty or liability for your use of this " information.      Learning About Alcohol Use Disorder  What is alcohol use disorder?  Alcohol use disorder means that a person drinks alcohol even though it causes harm to themselves or others. It can range from mild to severe. The more symptoms of this disorder you have, the more severe it may be. People who have it may find it hard to control their use of alcohol.  People who have this disorder may argue with others about how much they're drinking. Their job may be affected because of drinking. They may drink when it's dangerous or illegal, such as when they drive. They also may have a strong need, or craving, to drink. They may feel like they must drink just to get by. Their drinking may increase their risk of getting hurt or being in a car crash.  Over time, drinking too much alcohol may cause health problems. These may include high blood pressure, liver problems, or problems with digestion.  What are the symptoms?  Maybe you've wondered about your alcohol habits or how to tell if your drinking is becoming a problem.  Here are some of the symptoms of alcohol use disorder. You may have it if you have two or more of the following symptoms:  You drink larger amounts of alcohol than you ever meant to. Or you've been drinking for a longer time than you ever meant to.  You can't cut down or control your use. Or you constantly wish you could cut down.  You spend a lot of time getting or drinking alcohol or recovering from its effects.  You have strong cravings for alcohol.  You can no longer do your main jobs at work, at school, or at home.  You keep drinking alcohol, even though your use hurts your relationships.  You have stopped doing important activities because of your alcohol use.  You drink alcohol in situations where doing so is dangerous.  You keep drinking alcohol even though you know it's causing health problems.  You need more and more alcohol to get the same effect, or you get less effect from the same  "amount over time. This is called tolerance.  You have uncomfortable symptoms when you stop drinking alcohol or use less. This is called withdrawal.  Alcohol use disorder can range from mild to severe. The more symptoms you have, the more severe the disorder may be.  You might not realize that your drinking is a problem. You might not drink large amounts when you drink. Or you might go for days or weeks between drinking episodes. But even if you don't drink very often, your drinking could still be harmful and put you at risk.  How is alcohol use disorder treated?  Getting help is up to you. But you don't have to do it alone. There are many people and kinds of treatments that can help.  Treatment for alcohol use disorder can include:  Group therapy, one or more types of counseling, and alcohol education.  Medicines that help to:  Reduce withdrawal symptoms and help you safely stop drinking.  Reduce cravings for alcohol.  Support groups. These groups include Alcoholics Anonymous and [employer] (Self-Management and Recovery Training).  Some people are able to stop or cut back on drinking with help from a counselor. People who have moderate to severe alcohol use disorder may need medical treatment. They may need to stay in a hospital or treatment center.  You may have a treatment team to help you. This team may include a psychologist or psychiatrist, counselors, doctors, social workers, nurses, and a . A  helps plan and manage your treatment.  Follow-up care is a key part of your treatment and safety. Be sure to make and go to all appointments, and call your doctor if you are having problems. It's also a good idea to know your test results and keep a list of the medicines you take.  Where can you learn more?  Go to https://www.healthwise.net/patiented  Enter H758 in the search box to learn more about \"Learning About Alcohol Use Disorder.\"  Current as of: March 21, 2023               Content " Version: 13.8    5483-3932 [employer].   Care instructions adapted under license by your healthcare professional. If you have questions about a medical condition or this instruction, always ask your healthcare professional. [employer] disclaims any warranty or liability for your use of this information.      Substance Use Disorder: Care Instructions  Overview     You can improve your life and health by stopping your use of alcohol or drugs. When you don't drink or use drugs, you may feel and sleep better. You may get along better with your family, friends, and coworkers. There are medicines and programs that can help with substance use disorder.  How can you care for yourself at home?  Here are some ways to help you stay sober and prevent relapse.  If you have been given medicine to help keep you sober or reduce your cravings, be sure to take it exactly as prescribed.  Talk to your doctor about programs that can help you stop using drugs or drinking alcohol.  Do not keep alcohol or drugs in your home.  Plan ahead. Think about what you'll say if other people ask you to drink or use drugs. Try not to spend time with people who drink or use drugs.  Use the time and money spent on drinking or drugs to do something that's important to you.  Preventing a relapse  Have a plan to deal with relapse. Learn to recognize changes in your thinking that lead you to drink or use drugs. Get help before you start to drink or use drugs again.  Try to stay away from situations, friends, or places that may lead you to drink or use drugs.  If you feel the need to drink alcohol or use drugs again, seek help right away. Call a trusted friend or family member. Some people get support from organizations such as Narcotics Anonymous or [employer] or from treatment facilities.  If you relapse, get help as soon as you can. Some people make a plan with another person that outlines what they want that person to do  for them if they relapse. The plan usually includes how to handle the relapse and who to notify in case of relapse.  Don't give up. Remember that a relapse doesn't mean that you have failed. Use the experience to learn the triggers that lead you to drink or use drugs. Then quit again. Recovery is a lifelong process. Many people have several relapses before they are able to quit for good.  Follow-up care is a key part of your treatment and safety. Be sure to make and go to all appointments, and call your doctor if you are having problems. It's also a good idea to know your test results and keep a list of the medicines you take.  When should you call for help?   Call 911  anytime you think you may need emergency care. For example, call if you or someone else:    Has overdosed or has withdrawal signs. Be sure to tell the emergency workers that you are or someone else is using or trying to quit using drugs. Overdose or withdrawal signs may include:  Losing consciousness.  Seizure.  Seeing or hearing things that aren't there (hallucinations).     Is thinking or talking about suicide or harming others.   Where to get help 24 hours a day, 7 days a week   If you or someone you know talks about suicide, self-harm, a mental health crisis, a substance use crisis, or any other kind of emotional distress, get help right away. You can:    Call the Suicide and Crisis Lifeline at 9.     Call 3-793-267-TALK (1-847.366.4843).     Text HOME to 994275 to access the Crisis Text Line.   Consider saving these numbers in your phone.  Go to [email or web address] for more information or to chat online.  Call your doctor now or seek immediate medical care if:    You are having withdrawal symptoms. These may include nausea or vomiting, sweating, shakiness, and anxiety.   Watch closely for changes in your health, and be sure to contact your doctor if:    You have a relapse.     You need more help or support to stop.   Where can you learn more?  Go  "to https://www.[employer].net/patiented  Enter H573 in the search box to learn more about \"Substance Use Disorder: Care Instructions.\"  Current as of: March 21, 2023               Content Version: 13.8    2952-4106 [employer].   Care instructions adapted under license by your healthcare professional. If you have questions about a medical condition or this instruction, always ask your healthcare professional. [employer] disclaims any warranty or liability for your use of this information.      Preventive Care Advice   This is general advice given by our system to help you stay healthy. However, your care team may have specific advice just for you. Please talk to your care team about your preventive care needs.  Nutrition  Eat 5 or more servings of fruits and vegetables each day.  Try wheat bread, brown rice and whole grain pasta (instead of white bread, rice, and pasta).  Get enough calcium and vitamin D. Check the label on foods and aim for 100% of the RDA (recommended daily allowance).  Lifestyle  Exercise at least 150 minutes each week  (30 minutes a day, 5 days a week).  Do muscle strengthening activities 2 days a week. These help control your weight and prevent disease.  No smoking.  Wear sunscreen to prevent skin cancer.  Have a dental exam and cleaning every 6 months.  Yearly exams  See your health care team every year to talk about:  Any changes in your health.  Any medicines your care team has prescribed.  Preventive care, family planning, and ways to prevent chronic diseases.  Shots (vaccines)   HPV shots (up to age 26), if you've never had them before.  Hepatitis B shots (up to age 59), if you've never had them before.  COVID-19 shot: Get this shot when it's due.  Flu shot: Get a flu shot every year.  Tetanus shot: Get a tetanus shot every 10 years.  Pneumococcal, hepatitis A, and RSV shots: Ask your care team if you need these based on your risk.  Shingles shot (for age 50 and " up)  General health tests  Diabetes screening:  Starting at age 35, Get screened for diabetes at least every 3 years.  If you are younger than age 35, ask your care team if you should be screened for diabetes.  Cholesterol test: At age 39, start having a cholesterol test every 5 years, or more often if advised.  Bone density scan (DEXA): At age 50, ask your care team if you should have this scan for osteoporosis (brittle bones).  Hepatitis C: Get tested at least once in your life.  STIs (sexually transmitted infections)  Before age 24: Ask your care team if you should be screened for STIs.  After age 24: Get screened for STIs if you're at risk. You are at risk for STIs (including HIV) if:  You are sexually active with more than one person.  You don't use condoms every time.  You or a partner was diagnosed with a sexually transmitted infection.  If you are at risk for HIV, ask about PrEP medicine to prevent HIV.  Get tested for HIV at least once in your life, whether you are at risk for HIV or not.  Cancer screening tests  Cervical cancer screening: If you have a cervix, begin getting regular cervical cancer screening tests starting at age 21.  Breast cancer scan (mammogram): If you've ever had breasts, begin having regular mammograms starting at age 40. This is a scan to check for breast cancer.  Colon cancer screening: It is important to start screening for colon cancer at age 45.  Have a colonoscopy test every 10 years (or more often if you're at risk) Or, ask your provider about stool tests like a FIT test every year or Cologuard test every 3 years.  To learn more about your testing options, visit:   https://www.Better Walk/821532.pdf.  For help making a decision, visit:   https://bit.ly/ko59771.  Prostate cancer screening test: If you have a prostate, ask your care team if a prostate cancer screening test (PSA) at age 55 is right for you.  Lung cancer screening: If you are a current or former smoker ages 50 to  80, ask your care team if ongoing lung cancer screenings are right for you.  For informational purposes only. Not to replace the advice of your health care provider. Copyright   2023 [employer]. All rights reserved. Clinically reviewed by the [facility] Transitions Program. [employer] [id number] - REV 01/24.

## 2024-02-08 ENCOUNTER — TELEPHONE (OUTPATIENT)
Dept: FAMILY MEDICINE | Facility: OTHER | Age: 49
End: 2024-02-08
Payer: COMMERCIAL

## 2024-02-08 DIAGNOSIS — J45.40 MODERATE PERSISTENT ASTHMA WITHOUT COMPLICATION: Primary | ICD-10-CM

## 2024-02-08 NOTE — TELEPHONE ENCOUNTER
fluticasone-salmeterol (ADVAIR HFA) 115-21 MCG/ACT inhaler     Prior Authorization Retail Medication Request    Medication/Dose: fluticasone-salmeterol (ADVAIR HFA) 115-21 MCG/ACT inhaler  Diagnosis and ICD code (if different than what is on RX):    New/renewal/insurance change PA/secondary ins. PA:  Previously Tried and Failed:    Rationale:      Insurance   Primary:   Insurance ID:      Secondary (if applicable):  Insurance ID:      Pharmacy Information (if different than what is on RX)  Name:    Phone:    Fax:

## 2024-02-15 ENCOUNTER — LAB (OUTPATIENT)
Dept: INFUSION THERAPY | Facility: CLINIC | Age: 49
End: 2024-02-15
Attending: INTERNAL MEDICINE
Payer: COMMERCIAL

## 2024-02-15 VITALS
RESPIRATION RATE: 16 BRPM | WEIGHT: 202.1 LBS | BODY MASS INDEX: 29.83 KG/M2 | HEART RATE: 93 BPM | OXYGEN SATURATION: 96 % | DIASTOLIC BLOOD PRESSURE: 87 MMHG | SYSTOLIC BLOOD PRESSURE: 135 MMHG | TEMPERATURE: 98.4 F

## 2024-02-15 DIAGNOSIS — R79.89 HIGH SERUM FERRITIN: Primary | ICD-10-CM

## 2024-02-15 DIAGNOSIS — E83.19 IRON OVERLOAD: ICD-10-CM

## 2024-02-15 DIAGNOSIS — R79.89 ELEVATED TSH: ICD-10-CM

## 2024-02-15 LAB
FERRITIN SERPL-MCNC: 514 NG/ML (ref 31–409)
HCT VFR BLD AUTO: 49 % (ref 40–53)
HGB BLD-MCNC: 16.7 G/DL (ref 13.3–17.7)
HOLD SPECIMEN: NORMAL
TSH SERPL DL<=0.005 MIU/L-ACNC: 4.12 UIU/ML (ref 0.3–4.2)

## 2024-02-15 PROCEDURE — 36415 COLL VENOUS BLD VENIPUNCTURE: CPT | Performed by: INTERNAL MEDICINE

## 2024-02-15 PROCEDURE — 99207 PR NO CHARGE LOS: CPT

## 2024-02-15 PROCEDURE — 84443 ASSAY THYROID STIM HORMONE: CPT

## 2024-02-15 PROCEDURE — 82728 ASSAY OF FERRITIN: CPT | Performed by: INTERNAL MEDICINE

## 2024-02-15 PROCEDURE — 85014 HEMATOCRIT: CPT | Performed by: INTERNAL MEDICINE

## 2024-02-15 RX ORDER — HEPARIN SODIUM,PORCINE 10 UNIT/ML
5-20 VIAL (ML) INTRAVENOUS DAILY PRN
Status: CANCELLED | OUTPATIENT
Start: 2024-03-01

## 2024-02-15 RX ORDER — HEPARIN SODIUM (PORCINE) LOCK FLUSH IV SOLN 100 UNIT/ML 100 UNIT/ML
5 SOLUTION INTRAVENOUS
Status: CANCELLED | OUTPATIENT
Start: 2024-03-01

## 2024-02-15 NOTE — PROGRESS NOTES
"Prior to scheduled phlebotomy verified patient identity using patient's name and date of birth.  Lab(s) verified: Ferritin: 514 Hemoglobin: 16.7     Peripheral Access:  Accessed: left AC with 20ga autoguard instyte catheter without difficulty.  Positive blood return.  No redness, edema or complaints of discomfort.  Pt tolerated phlebotomy, 500 mls of blood removed via gravity using whole blood collection bag and scale per MD's order.  500 mls of oral fluid replaced.  Pt tolerated procedure without adverse reactions.  Pt denies headache, nausea or discomfort.  IV flushed with 10mls 0.9% Normal Saline after completion of procedure and discontinued per policy with pressure dressing applied.      Pre & post Vital Signs including O2 saturation documented in \"Vitals\"    Reviewed and provided discharge instructions regarding therapeutic phlebotomy.  Pt verbalized understanding.    Reviewed appointments and copy of schedule given to pt.  Return to clinic in: 3/14.       Ambulation steady.  Pt alert and orientated upon discharge.          "

## 2024-02-22 NOTE — TELEPHONE ENCOUNTER
Central Prior Authorization Team   Phone: [phone]    PA Initiation    Medication: fluticasone-salmeterol (ADVAIR HFA) 115-21 MCG/ACT inhaler  Insurance Company: [employer] - Phone [phone] Fax [phone]  Pharmacy Filling the Rx: [id number] - [place], [place] - [address] AT [facility] [address]  Filling Pharmacy Phone: [phone]  Filling Pharmacy Fax:    Start Date: 2/22/2024

## 2024-02-22 NOTE — TELEPHONE ENCOUNTER
PRIOR AUTHORIZATION DENIED    Medication: fluticasone-salmeterol (ADVAIR HFA) 115-21 MCG/ACT inhaler    Denial Date: 2/22/2024    Denial Rational:  Patient must have a history of trial & failure to the formulary alternative(s) or have a contraindication or intolerance to the formulary alternatives:              Appeal Information:    If you would like to appeal, please supply P/A team with a letter of medical necessity with clinical reason.

## 2024-02-23 RX ORDER — FLUTICASONE PROPIONATE AND SALMETEROL XINAFOATE 115; 21 UG/1; UG/1
2 AEROSOL, METERED RESPIRATORY (INHALATION) 2 TIMES DAILY
Qty: 8 G | Refills: 5 | Status: SHIPPED | OUTPATIENT
Start: 2024-02-23 | End: 2024-09-06

## 2024-03-06 DIAGNOSIS — J45.40 MODERATE PERSISTENT ASTHMA WITHOUT COMPLICATION: ICD-10-CM

## 2024-03-06 RX ORDER — ALBUTEROL SULFATE 90 UG/1
AEROSOL, METERED RESPIRATORY (INHALATION)
Qty: 6.7 G | Refills: 1 | Status: SHIPPED | OUTPATIENT
Start: 2024-03-06 | End: 2024-04-11

## 2024-03-12 ENCOUNTER — INFUSION THERAPY VISIT (OUTPATIENT)
Dept: INFUSION THERAPY | Facility: CLINIC | Age: 49
End: 2024-03-12
Attending: INTERNAL MEDICINE
Payer: COMMERCIAL

## 2024-03-12 ENCOUNTER — LAB (OUTPATIENT)
Dept: INFUSION THERAPY | Facility: CLINIC | Age: 49
End: 2024-03-12
Attending: NURSE PRACTITIONER
Payer: COMMERCIAL

## 2024-03-12 VITALS
SYSTOLIC BLOOD PRESSURE: 139 MMHG | OXYGEN SATURATION: 97 % | TEMPERATURE: 97 F | RESPIRATION RATE: 16 BRPM | DIASTOLIC BLOOD PRESSURE: 92 MMHG | HEART RATE: 89 BPM

## 2024-03-12 DIAGNOSIS — R79.89 HIGH SERUM FERRITIN: Primary | ICD-10-CM

## 2024-03-12 DIAGNOSIS — E83.19 IRON OVERLOAD: ICD-10-CM

## 2024-03-12 LAB
BASOPHILS # BLD AUTO: 0 10E3/UL (ref 0–0.2)
BASOPHILS NFR BLD AUTO: 1 %
EOSINOPHIL # BLD AUTO: 0.4 10E3/UL (ref 0–0.7)
EOSINOPHIL NFR BLD AUTO: 6 %
ERYTHROCYTE [DISTWIDTH] IN BLOOD BY AUTOMATED COUNT: 11.9 % (ref 10–15)
FERRITIN SERPL-MCNC: 387 NG/ML (ref 31–409)
HCT VFR BLD AUTO: 46.6 % (ref 40–53)
HGB BLD-MCNC: 16.2 G/DL (ref 13.3–17.7)
HOLD SPECIMEN: NORMAL
IMM GRANULOCYTES # BLD: 0 10E3/UL
IMM GRANULOCYTES NFR BLD: 0 %
LYMPHOCYTES # BLD AUTO: 2.8 10E3/UL (ref 0–5.3)
LYMPHOCYTES NFR BLD AUTO: 43 %
MCH RBC QN AUTO: 34.6 PG (ref 26.5–33)
MCHC RBC AUTO-ENTMCNC: 34.8 G/DL (ref 31.5–36.5)
MCV RBC AUTO: 100 FL (ref 78–100)
MONOCYTES # BLD AUTO: 0.5 10E3/UL (ref 0–1.3)
MONOCYTES NFR BLD AUTO: 9 %
NEUTROPHILS # BLD AUTO: 2.6 10E3/UL (ref 1.6–8.3)
NEUTROPHILS NFR BLD AUTO: 42 %
NRBC # BLD AUTO: 0 10E3/UL
NRBC BLD AUTO-RTO: 0 /100
PLATELET # BLD AUTO: 206 10E3/UL (ref 150–450)
RBC # BLD AUTO: 4.68 10E6/UL (ref 4.4–5.9)
WBC # BLD AUTO: 6.4 10E3/UL (ref 4–11)

## 2024-03-12 PROCEDURE — 82728 ASSAY OF FERRITIN: CPT

## 2024-03-12 PROCEDURE — 99207 PR NO CHARGE LOS: CPT

## 2024-03-12 PROCEDURE — 36415 COLL VENOUS BLD VENIPUNCTURE: CPT

## 2024-03-12 PROCEDURE — 85025 COMPLETE CBC W/AUTO DIFF WBC: CPT

## 2024-03-12 RX ORDER — HEPARIN SODIUM,PORCINE 10 UNIT/ML
5-20 VIAL (ML) INTRAVENOUS DAILY PRN
Status: CANCELLED | OUTPATIENT
Start: 2024-04-01

## 2024-03-12 RX ORDER — HEPARIN SODIUM (PORCINE) LOCK FLUSH IV SOLN 100 UNIT/ML 100 UNIT/ML
5 SOLUTION INTRAVENOUS
Status: CANCELLED | OUTPATIENT
Start: 2024-04-01

## 2024-03-12 NOTE — PROGRESS NOTES
"Prior to scheduled phlebotomy verified patient identity using patient's name and date of birth.  Lab(s) verified: HGB 16.2 FERRITIN 387    Parameters: Proceed with phlebotomy if hemoglobin more than 11 and last ferritin more than 50.  Volume to be removed: 500 mL.     Peripheral Access:  Accessed: RAC* with 20ga autoguard instyte catheter without difficulty.  Positive blood return.  No redness, edema or complaints of discomfort.  Pt tolerated phlebotomy, 500 mls of blood removed via gravity using whole blood collection bag and scale per MD's order.  500 mls of oral fluid replaced.  Pt tolerated procedure without adverse reactions.  Pt denies headache, nausea or discomfort.  IV flushed with 10mls 0.9% Normal Saline after completion of procedure and discontinued per policy with pressure dressing applied.      Pre Vital Signs including O2 saturation documented in \"Vitals\"  Post Vital Signs see flowsheets    Reviewed and provided discharge instructions regarding therapeutic phlebotomy.  Pt verbalized understanding.    Reviewed appointments and copy of schedule given to pt.  Return to clinic in: 1 month.       Ambulation steady.  Pt alert and orientated upon discharge.          "

## 2024-03-21 ENCOUNTER — MYC REFILL (OUTPATIENT)
Dept: FAMILY MEDICINE | Facility: OTHER | Age: 49
End: 2024-03-21
Payer: COMMERCIAL

## 2024-03-21 DIAGNOSIS — J45.40 MODERATE PERSISTENT ASTHMA WITHOUT COMPLICATION: ICD-10-CM

## 2024-03-21 RX ORDER — ALBUTEROL SULFATE 90 UG/1
AEROSOL, METERED RESPIRATORY (INHALATION)
Qty: 6.7 G | Refills: 1 | OUTPATIENT
Start: 2024-03-21

## 2024-03-21 RX ORDER — FLUTICASONE PROPIONATE AND SALMETEROL XINAFOATE 115; 21 UG/1; UG/1
2 AEROSOL, METERED RESPIRATORY (INHALATION) 2 TIMES DAILY
Qty: 8 G | Refills: 5 | OUTPATIENT
Start: 2024-03-21

## 2024-03-27 DIAGNOSIS — J45.40 MODERATE PERSISTENT ASTHMA WITHOUT COMPLICATION: ICD-10-CM

## 2024-03-27 RX ORDER — ALBUTEROL SULFATE 90 UG/1
AEROSOL, METERED RESPIRATORY (INHALATION)
Qty: 6.7 G | Refills: 1 | OUTPATIENT
Start: 2024-03-27

## 2024-04-11 DIAGNOSIS — J45.40 MODERATE PERSISTENT ASTHMA WITHOUT COMPLICATION: ICD-10-CM

## 2024-04-11 RX ORDER — ALBUTEROL SULFATE 90 UG/1
AEROSOL, METERED RESPIRATORY (INHALATION)
Qty: 6.7 G | Refills: 2 | Status: SHIPPED | OUTPATIENT
Start: 2024-04-11 | End: 2024-06-19

## 2024-04-11 NOTE — TELEPHONE ENCOUNTER
Pt is asking to have additional refills sent to pharmacy. He said every month or every other he has to request additional refills from clinic.  He had to use this more while he was fighting with insurance regarding  his advair which he just got

## 2024-05-14 ENCOUNTER — TELEPHONE (OUTPATIENT)
Dept: ONCOLOGY | Facility: CLINIC | Age: 49
End: 2024-05-14

## 2024-05-14 NOTE — TELEPHONE ENCOUNTER
Satish missed lab & phlebotomy appointment today. Called patient and gave him [phone] phone number to call and reschedule. He stated he would call tomorrow 5/15 and get himself rescheduled.    Reviewed Dr Lopes appointment on 5/17/24.    Marissa William RN

## 2024-06-19 DIAGNOSIS — J45.40 MODERATE PERSISTENT ASTHMA WITHOUT COMPLICATION: ICD-10-CM

## 2024-06-19 RX ORDER — ALBUTEROL SULFATE 90 UG/1
AEROSOL, METERED RESPIRATORY (INHALATION)
Qty: 6.7 G | Refills: 2 | Status: SHIPPED | OUTPATIENT
Start: 2024-06-19 | End: 2024-08-09

## 2024-07-08 ENCOUNTER — LAB (OUTPATIENT)
Dept: INFUSION THERAPY | Facility: CLINIC | Age: 49
End: 2024-07-08
Attending: INTERNAL MEDICINE
Payer: COMMERCIAL

## 2024-07-08 ENCOUNTER — ONCOLOGY VISIT (OUTPATIENT)
Dept: ONCOLOGY | Facility: CLINIC | Age: 49
End: 2024-07-08
Attending: INTERNAL MEDICINE
Payer: COMMERCIAL

## 2024-07-08 VITALS
DIASTOLIC BLOOD PRESSURE: 79 MMHG | OXYGEN SATURATION: 94 % | HEART RATE: 90 BPM | BODY MASS INDEX: 27.89 KG/M2 | RESPIRATION RATE: 16 BRPM | SYSTOLIC BLOOD PRESSURE: 120 MMHG | WEIGHT: 188.3 LBS | HEIGHT: 69 IN

## 2024-07-08 VITALS — SYSTOLIC BLOOD PRESSURE: 123 MMHG | DIASTOLIC BLOOD PRESSURE: 82 MMHG

## 2024-07-08 DIAGNOSIS — E83.111 IRON OVERLOAD DUE TO REPEATED RED BLOOD CELL TRANSFUSIONS: ICD-10-CM

## 2024-07-08 DIAGNOSIS — E83.19 IRON OVERLOAD: ICD-10-CM

## 2024-07-08 DIAGNOSIS — R79.89 HIGH SERUM FERRITIN: Primary | ICD-10-CM

## 2024-07-08 LAB
BASOPHILS # BLD AUTO: 0 10E3/UL (ref 0–0.2)
BASOPHILS NFR BLD AUTO: 1 %
EOSINOPHIL # BLD AUTO: 0.2 10E3/UL (ref 0–0.7)
EOSINOPHIL NFR BLD AUTO: 5 %
ERYTHROCYTE [DISTWIDTH] IN BLOOD BY AUTOMATED COUNT: 12.3 % (ref 10–15)
FERRITIN SERPL-MCNC: 218 NG/ML (ref 31–409)
HCT VFR BLD AUTO: 44.2 % (ref 40–53)
HGB BLD-MCNC: 15.6 G/DL (ref 13.3–17.7)
HOLD SPECIMEN: NORMAL
IMM GRANULOCYTES # BLD: 0 10E3/UL
IMM GRANULOCYTES NFR BLD: 0 %
LYMPHOCYTES # BLD AUTO: 2 10E3/UL (ref 0.8–5.3)
LYMPHOCYTES NFR BLD AUTO: 37 %
MCH RBC QN AUTO: 35.4 PG (ref 26.5–33)
MCHC RBC AUTO-ENTMCNC: 35.3 G/DL (ref 31.5–36.5)
MCV RBC AUTO: 100 FL (ref 78–100)
MONOCYTES # BLD AUTO: 0.4 10E3/UL (ref 0–1.3)
MONOCYTES NFR BLD AUTO: 8 %
NEUTROPHILS # BLD AUTO: 2.6 10E3/UL (ref 1.6–8.3)
NEUTROPHILS NFR BLD AUTO: 49 %
NRBC # BLD AUTO: 0 10E3/UL
NRBC BLD AUTO-RTO: 0 /100
PLATELET # BLD AUTO: 202 10E3/UL (ref 150–450)
RBC # BLD AUTO: 4.41 10E6/UL (ref 4.4–5.9)
WBC # BLD AUTO: 5.3 10E3/UL (ref 4–11)

## 2024-07-08 PROCEDURE — 99195 PHLEBOTOMY: CPT

## 2024-07-08 PROCEDURE — 99213 OFFICE O/P EST LOW 20 MIN: CPT | Performed by: INTERNAL MEDICINE

## 2024-07-08 PROCEDURE — 36415 COLL VENOUS BLD VENIPUNCTURE: CPT

## 2024-07-08 PROCEDURE — 85041 AUTOMATED RBC COUNT: CPT

## 2024-07-08 PROCEDURE — 99214 OFFICE O/P EST MOD 30 MIN: CPT | Performed by: INTERNAL MEDICINE

## 2024-07-08 PROCEDURE — 82728 ASSAY OF FERRITIN: CPT

## 2024-07-08 PROCEDURE — 99207 PR NO CHARGE LOS: CPT

## 2024-07-08 RX ORDER — HEPARIN SODIUM,PORCINE 10 UNIT/ML
5-20 VIAL (ML) INTRAVENOUS DAILY PRN
Status: CANCELLED | OUTPATIENT
Start: 2024-08-01

## 2024-07-08 RX ORDER — HEPARIN SODIUM (PORCINE) LOCK FLUSH IV SOLN 100 UNIT/ML 100 UNIT/ML
5 SOLUTION INTRAVENOUS
Status: CANCELLED | OUTPATIENT
Start: 2024-08-01

## 2024-07-08 ASSESSMENT — PAIN SCALES - GENERAL: PAINLEVEL: NO PAIN (0)

## 2024-07-08 NOTE — LETTER
2024      Karson Guzman  [address] [facility] [address]      Dear Colleague,    Thank you for referring your patient, Karson Guzman, to the [facility]. Please see a copy of my visit note below.    [facility] Hematology / Oncology  Progress Note  Name: Karson Guzman  :  1975    MRN:  [id number]    --------------------    Assessment / Plan:  Elevated ferritin, likely multifactorial (trace iron overload, alcohol use, weight)    Reviewed nonspecific nature of elevated ferritin including iron overload, alcohol abuse, obesity, inflammation.  Liver MRI with mild iron overload state.  Nonetheless, ferritin has come down nicely with phlebotomy treatments.  He has lost some weight and was congratulated.  Continue monthly ferritin with goal around 40-80; suspect it will take another 2-3 phlebotomy sessions.  Once we have his ferritin around goal, plan to recheck every 3 to 4 months with as needed phlebotomies.      Dmitriy Read MD    --------------------    Interval History:  Davidson returns for follow-up of elevated ferritin.  He had about 4 treatments that he has done well with from an phlebotomy standpoint.  His ferritin has been coming down nicely.  There is an insurance lapse due to his insurance being changed with work and has been off for several months now.    Social History:  Social History     Tobacco Use     Smoking status: Some Days     Current packs/day: 0.00     Types: Cigarettes     Last attempt to quit: 2003     Years since quittin.2     Passive exposure: Never     Smokeless tobacco: Never     Tobacco comments:     not everyday- can go months without, then may have a few   Substance Use Topics     Alcohol use: Yes     Comment: occ. use- per week use- varies 0-4     Family History:  Family History   Problem Relation Age of Onset     Hypertension Mother      Cancer Father      Diabetes No family hx of      Hyperlipidemia No family  "hx of      Other Cancer No family hx of      Immunizations:  Immunization History   Administered Date(s) Administered     TDAP (Adacel,Boostrix) 02/06/2024     TDAP Vaccine (Adacel) 03/31/2014     Health Maintenance Due   Topic Date Due     Pneumococcal Vaccine: Pediatrics (0 to 5 Years) and At-Risk Patients (6 to 64 Years) (1 of 2 - PCV) Never done     HEPATITIS B IMMUNIZATION (1 of 3 - 19+ 3-dose series) Never done     ZOSTER IMMUNIZATION (1 of 2) Never done     COVID-19 Vaccine (1 - 2023-24 season) Never done     --------------------    Physical Exam:  VS: /79 (BP Location: Left arm)   Pulse 90   Resp 16   Ht 1.753 m (5' 9.02\")   Wt 85.4 kg (188 lb 4.8 oz)   SpO2 94%   BMI 27.79 kg/m  .  GEN: Well appearing.    Labs / Imaging:  Reviewed CBC, ferritin, iron panel, HFE genotype.  Reviewed liver MRI.      Again, thank you for allowing me to participate in the care of your patient.        Sincerely,        Dmitriy Read MD  "

## 2024-07-08 NOTE — PROGRESS NOTES
[facility] Hematology / Oncology  Progress Note  Name: Karson Guzman  :  1975    MRN:  [id number]    --------------------    Assessment / Plan:  Elevated ferritin, likely multifactorial (trace iron overload, alcohol use, weight)    Reviewed nonspecific nature of elevated ferritin including iron overload, alcohol abuse, obesity, inflammation.  Liver MRI with mild iron overload state.  Nonetheless, ferritin has come down nicely with phlebotomy treatments.  He has lost some weight and was congratulated.  Continue monthly ferritin with goal around 40-80; suspect it will take another 2-3 phlebotomy sessions.  Once we have his ferritin around goal, plan to recheck every 3 to 4 months with as needed phlebotomies.      Dmitriy Read MD    --------------------    Interval History:  Davidson returns for follow-up of elevated ferritin.  He had about 4 treatments that he has done well with from an phlebotomy standpoint.  His ferritin has been coming down nicely.  There is an insurance lapse due to his insurance being changed with work and has been off for several months now.    Social History:  Social History     Tobacco Use    Smoking status: Some Days     Current packs/day: 0.00     Types: Cigarettes     Last attempt to quit: 2003     Years since quittin.2     Passive exposure: Never    Smokeless tobacco: Never    Tobacco comments:     not everyday- can go months without, then may have a few   Substance Use Topics    Alcohol use: Yes     Comment: occ. use- per week use- varies 0-4     Family History:  Family History   Problem Relation Age of Onset    Hypertension Mother     Cancer Father     Diabetes No family hx of     Hyperlipidemia No family hx of     Other Cancer No family hx of      Immunizations:  Immunization History   Administered Date(s) Administered    TDAP (Adacel,Boostrix) 2024    TDAP Vaccine (Adacel) 2014     Health Maintenance Due   Topic Date Due    Pneumococcal  "Vaccine: Pediatrics (0 to 5 Years) and At-Risk Patients (6 to 64 Years) (1 of 2 - PCV) Never done    HEPATITIS B IMMUNIZATION (1 of 3 - 19+ 3-dose series) Never done    ZOSTER IMMUNIZATION (1 of 2) Never done    COVID-19 Vaccine (1 - 2023-24 season) Never done     --------------------    Physical Exam:  VS: /79 (BP Location: Left arm)   Pulse 90   Resp 16   Ht 1.753 m (5' 9.02\")   Wt 85.4 kg (188 lb 4.8 oz)   SpO2 94%   BMI 27.79 kg/m  .  GEN: Well appearing.    Labs / Imaging:  Reviewed CBC, ferritin, iron panel, HFE genotype.  Reviewed liver MRI.  "

## 2024-07-08 NOTE — NURSING NOTE
"Oncology Rooming Note    July 8, 2024 2:28 PM   Karson Guzman is a 49 year old male who presents for:    Chief Complaint   Patient presents with    Oncology Clinic Visit     Transfer of care     Initial Vitals: /79 (BP Location: Left arm)   Pulse 90   Resp 16   Ht 1.753 m (5' 9.02\")   Wt 85.4 kg (188 lb 4.8 oz)   SpO2 94%   BMI 27.79 kg/m   Estimated body mass index is 27.79 kg/m  as calculated from the following:    Height as of this encounter: 1.753 m (5' 9.02\").    Weight as of this encounter: 85.4 kg (188 lb 4.8 oz). Body surface area is 2.04 meters squared.  No Pain (0) Comment: Data Unavailable   No LMP for male patient.  Allergies reviewed: Yes  Medications reviewed: Yes    Medications: Medication refills not needed today.  Pharmacy name entered into [id number] [address] [place] [facility] [address]    Frailty Screening:   Is the patient here for a new oncology consult visit in cancer care? 2. No      Clinical concerns: No new concerns         Vickie Fam LPN              "

## 2024-07-08 NOTE — PROGRESS NOTES
"Prior to scheduled phlebotomy verified patient identity using patient's name and date of birth.  Lab(s) verified: ferritin: 218; hemoglobin: 15.6    Peripheral Access:  Accessed: right antecubital with 20ga autoguard instyte catheter without difficulty.  Positive blood return.  No redness, edema or complaints of discomfort.  Pt tolerated phlebotomy, 500 mls of blood removed via gravity using whole blood collection bag and scale per MD's order.  480 mls of oral fluid replaced.  Pt tolerated procedure without adverse reactions.  Pt denies headache, nausea or discomfort.  IV flushed with 10mls 0.9% Normal Saline after completion of procedure and discontinued per policy with pressure dressing applied.      Pre Vital Signs including O2 saturation documented in \"Vitals\"  Post Vital Signs see flowsheets    Reviewed and provided discharge instructions regarding therapeutic phlebotomy.  Pt verbalized understanding.    Reviewed appointments and copy of schedule given to pt.  Return to clinic in: 1 month. Patient directed to scheduling to make next appointment.       Ambulation steady.  Pt alert and orientated upon discharge.          "

## 2024-08-04 ENCOUNTER — HOSPITAL ENCOUNTER (EMERGENCY)
Facility: CLINIC | Age: 49
Discharge: HOME | End: 2024-08-04
Attending: EMERGENCY MEDICINE | Admitting: EMERGENCY MEDICINE
Payer: COMMERCIAL

## 2024-08-04 VITALS
DIASTOLIC BLOOD PRESSURE: 89 MMHG | HEART RATE: 94 BPM | WEIGHT: 180 LBS | OXYGEN SATURATION: 96 % | SYSTOLIC BLOOD PRESSURE: 147 MMHG | BODY MASS INDEX: 26.57 KG/M2 | TEMPERATURE: 98.7 F | RESPIRATION RATE: 18 BRPM

## 2024-08-04 DIAGNOSIS — R00.2 PALPITATIONS: ICD-10-CM

## 2024-08-04 LAB
ANION GAP SERPL CALCULATED.3IONS-SCNC: 21 MMOL/L (ref 7–15)
BASOPHILS # BLD AUTO: 0 10E3/UL (ref 0–0.2)
BASOPHILS NFR BLD AUTO: 1 %
BUN SERPL-MCNC: 8.5 MG/DL (ref 6–20)
CALCIUM SERPL-MCNC: 9.2 MG/DL (ref 8.8–10.4)
CHLORIDE SERPL-SCNC: 98 MMOL/L (ref 98–107)
CREAT SERPL-MCNC: 0.77 MG/DL (ref 0.67–1.17)
EGFRCR SERPLBLD CKD-EPI 2021: >90 ML/MIN/1.73M2
EOSINOPHIL # BLD AUTO: 0 10E3/UL (ref 0–0.7)
EOSINOPHIL NFR BLD AUTO: 0 %
ERYTHROCYTE [DISTWIDTH] IN BLOOD BY AUTOMATED COUNT: 12.3 % (ref 10–15)
GLUCOSE SERPL-MCNC: 95 MG/DL (ref 70–99)
HCO3 SERPL-SCNC: 20 MMOL/L (ref 22–29)
HCT VFR BLD AUTO: 44.6 % (ref 40–53)
HGB BLD-MCNC: 16.3 G/DL (ref 13.3–17.7)
HOLD SPECIMEN: NORMAL
IMM GRANULOCYTES # BLD: 0 10E3/UL
IMM GRANULOCYTES NFR BLD: 0 %
LYMPHOCYTES # BLD AUTO: 1.5 10E3/UL (ref 0.8–5.3)
LYMPHOCYTES NFR BLD AUTO: 21 %
MCH RBC QN AUTO: 35.7 PG (ref 26.5–33)
MCHC RBC AUTO-ENTMCNC: 36.5 G/DL (ref 31.5–36.5)
MCV RBC AUTO: 98 FL (ref 78–100)
MONOCYTES # BLD AUTO: 0.6 10E3/UL (ref 0–1.3)
MONOCYTES NFR BLD AUTO: 8 %
NEUTROPHILS # BLD AUTO: 5.1 10E3/UL (ref 1.6–8.3)
NEUTROPHILS NFR BLD AUTO: 70 %
NRBC # BLD AUTO: 0 10E3/UL
NRBC BLD AUTO-RTO: 0 /100
PLATELET # BLD AUTO: 190 10E3/UL (ref 150–450)
POTASSIUM SERPL-SCNC: 3.6 MMOL/L (ref 3.4–5.3)
RBC # BLD AUTO: 4.57 10E6/UL (ref 4.4–5.9)
SODIUM SERPL-SCNC: 139 MMOL/L (ref 135–145)
TROPONIN T SERPL HS-MCNC: 11 NG/L
WBC # BLD AUTO: 7.2 10E3/UL (ref 4–11)

## 2024-08-04 PROCEDURE — 99284 EMERGENCY DEPT VISIT MOD MDM: CPT | Mod: 25 | Performed by: EMERGENCY MEDICINE

## 2024-08-04 PROCEDURE — 93010 ELECTROCARDIOGRAM REPORT: CPT | Performed by: EMERGENCY MEDICINE

## 2024-08-04 PROCEDURE — 96361 HYDRATE IV INFUSION ADD-ON: CPT | Performed by: EMERGENCY MEDICINE

## 2024-08-04 PROCEDURE — 258N000003 HC RX IP 258 OP 636: Performed by: EMERGENCY MEDICINE

## 2024-08-04 PROCEDURE — 84484 ASSAY OF TROPONIN QUANT: CPT | Performed by: EMERGENCY MEDICINE

## 2024-08-04 PROCEDURE — 250N000011 HC RX IP 250 OP 636: Performed by: EMERGENCY MEDICINE

## 2024-08-04 PROCEDURE — 36415 COLL VENOUS BLD VENIPUNCTURE: CPT | Performed by: EMERGENCY MEDICINE

## 2024-08-04 PROCEDURE — 93005 ELECTROCARDIOGRAM TRACING: CPT | Performed by: EMERGENCY MEDICINE

## 2024-08-04 PROCEDURE — 80048 BASIC METABOLIC PNL TOTAL CA: CPT | Performed by: EMERGENCY MEDICINE

## 2024-08-04 PROCEDURE — 85025 COMPLETE CBC W/AUTO DIFF WBC: CPT | Performed by: EMERGENCY MEDICINE

## 2024-08-04 PROCEDURE — 99284 EMERGENCY DEPT VISIT MOD MDM: CPT | Performed by: EMERGENCY MEDICINE

## 2024-08-04 PROCEDURE — 96374 THER/PROPH/DIAG INJ IV PUSH: CPT | Performed by: EMERGENCY MEDICINE

## 2024-08-04 RX ORDER — LORAZEPAM 2 MG/ML
1 INJECTION INTRAMUSCULAR ONCE
Status: COMPLETED | OUTPATIENT
Start: 2024-08-04 | End: 2024-08-04

## 2024-08-04 RX ADMIN — SODIUM CHLORIDE 1000 ML: 9 INJECTION, SOLUTION INTRAVENOUS at 16:39

## 2024-08-04 RX ADMIN — LORAZEPAM 1 MG: 2 INJECTION INTRAMUSCULAR; INTRAVENOUS at 18:01

## 2024-08-04 ASSESSMENT — ACTIVITIES OF DAILY LIVING (ADL)
ADLS_ACUITY_SCORE: 35
ADLS_ACUITY_SCORE: 35

## 2024-08-04 ASSESSMENT — COLUMBIA-SUICIDE SEVERITY RATING SCALE - C-SSRS
1. IN THE PAST MONTH, HAVE YOU WISHED YOU WERE DEAD OR WISHED YOU COULD GO TO SLEEP AND NOT WAKE UP?: NO
6. HAVE YOU EVER DONE ANYTHING, STARTED TO DO ANYTHING, OR PREPARED TO DO ANYTHING TO END YOUR LIFE?: NO
2. HAVE YOU ACTUALLY HAD ANY THOUGHTS OF KILLING YOURSELF IN THE PAST MONTH?: NO

## 2024-08-04 NOTE — ED PROVIDER NOTES
History     Chief Complaint   Patient presents with    Palpitations     HPI  Karson Guzman is a 49 year old male who has a history of obstructive sleep apnea, anxiety, asthma, iron overload who presents to the emergency department secondary to 2 palpitations.  He was driving home from [place] [employer] this afternoon at 230 and started having palpitations.  He has not had any pain or discomfort.  He feels jittery.  He drank a lot of alcohol over the weekend at the music festival.  He also got plenty of sun.  He does not feel like he is dehydrated since he has been urinating normal amounts.  He tried to drink water today.  He did have coffee Gatorade and water.  He did not have an excessive amount of caffeine.  He says he feels like he did when he was in his 20s and he would take Adderall to get him going.  He does not use any drugs now.  He feels jittery and a bit anxious.  Symptoms started couple of hours ago.    Allergies:  Allergies   Allergen Reactions    Aspirin Unknown     As a kid       Problem List:    Patient Active Problem List    Diagnosis Date Noted    Iron overload 12/20/2023     Priority: Medium    Class 1 obesity due to excess calories without serious comorbidity with body mass index (BMI) of 31.0 to 31.9 in adult 10/14/2020     Priority: Medium    ALBERTO (obstructive sleep apnea) 01/13/2020     Priority: Medium     1/12/2020 [place] [facility] Sleep Study (225.0 lbs) - AHI 15.9, RDI 34.7, Supine .7, REM AHI 14.8, Low O2 86.0%, Time Spent ?88% 0.1 minutes / Time Spent ?89% 0.1 minutes.      High serum ferritin 10/24/2019     Priority: Medium    Concussion without loss of consciousness 12/28/2016     Priority: Medium    Anxiety 04/07/2014     Priority: Medium    Nasal polyp      Priority: Medium     noted on left side.      Moderate persistent asthma      Priority: Medium    Allergy to mold spores      Priority: Medium     4/16/13 RAST pos. for: DM/M/G/RW      Seasonal allergic conjunctivitis       Priority: Medium    Seasonal allergic rhinitis      Priority: Medium    House dust mite allergy      Priority: Medium    Diagnostic skin and sensitization tests(aka ALLERGENS)      Priority: Medium    Hypovitaminosis D      Priority: Medium     noted 13, put on Rx      Mild intermittent asthma 10/01/2009     Priority: Medium    Pain in joint, multiple sites 2008     Priority: Medium     Overview:   Workup by rheum and neurology, see scans          Past Medical History:    Past Medical History:   Diagnosis Date    Allergy to mold spores     Diagnostic skin and sensitization tests 13 RAST pos. for: DM/M/G/RW    House dust mite allergy     Hypovitaminosis D     Moderate persistent asthma     Nasal polyp     ALBERTO (obstructive sleep apnea)     Seasonal allergic conjunctivitis     Seasonal allergic rhinitis        Past Surgical History:    Past Surgical History:   Procedure Laterality Date    COLONOSCOPY N/A 2019    Procedure: COLONOSCOPY;  Surgeon: Giovanni Greenfield MD;  Location: [facility]    HERNIORRHAPHY VENTRAL N/A 3/4/2015    Procedure: HERNIORRHAPHY VENTRAL;  Surgeon: Giovanni Greenfield MD;  Location: [facility] OR       Family History:    Family History   Problem Relation Age of Onset    Hypertension Mother     Cancer Father     Diabetes No family hx of     Hyperlipidemia No family hx of     Other Cancer No family hx of        Social History:  Marital Status:  Single [1]  Social History     Tobacco Use    Smoking status: Some Days     Current packs/day: 0.00     Types: Cigarettes     Last attempt to quit: 2003     Years since quittin.3     Passive exposure: Never    Smokeless tobacco: Never    Tobacco comments:     not everyday- can go months without, then may have a few   Vaping Use    Vaping status: Never Used   Substance Use Topics    Alcohol use: Yes     Comment: occ. use- per week use- varies 0-4    Drug use: No        Medications:    ADVAIR -21 MCG/ACT inhaler  albuterol (PROAIR  HFA/PROVENTIL HFA/VENTOLIN HFA) 108 (90 Base) MCG/ACT inhaler  montelukast (SINGULAIR) 10 MG tablet  multivitamin w/minerals (THERA-VIT-M) tablet          Review of Systems   All other systems reviewed and are negative.      Physical Exam   BP: (!) 165/110  Pulse: 105  Temp: 98.7  F (37.1  C)  Resp: 18  Weight: 81.6 kg (180 lb)  SpO2: 100 %      Physical Exam  Vitals and nursing note reviewed.   Constitutional:       General: He is not in acute distress.     Appearance: He is well-developed. He is not diaphoretic.   HENT:      Head: Normocephalic and atraumatic.      Nose: Nose normal. No congestion.      Mouth/Throat:      Mouth: Mucous membranes are moist.      Pharynx: Oropharynx is clear.   Eyes:      General: No scleral icterus.     Extraocular Movements: Extraocular movements intact.      Conjunctiva/sclera: Conjunctivae normal.      Pupils: Pupils are equal, round, and reactive to light.   Cardiovascular:      Rate and Rhythm: Regular rhythm. Tachycardia present.   Pulmonary:      Effort: Pulmonary effort is normal.   Abdominal:      General: Abdomen is flat.   Musculoskeletal:         General: No tenderness or deformity. Normal range of motion.      Cervical back: Normal range of motion and neck supple.   Lymphadenopathy:      Cervical: No cervical adenopathy.   Skin:     General: Skin is warm and dry.      Capillary Refill: Capillary refill takes less than 2 seconds.      Findings: No rash.   Neurological:      General: No focal deficit present.      Mental Status: He is alert and oriented to person, place, and time.      Cranial Nerves: No cranial nerve deficit.      Sensory: No sensory deficit.      Coordination: Coordination normal.         ED Course        Procedures              EKG Interpretation:      Interpreted by Sean Pedraza MD  Time reviewed: 1625  Symptoms at time of EKG: palpitations   Rhythm: normal sinus   Rate: normal  Axis: rightward  Ectopy: none  Conduction: normal  ST Segments/ T  Waves: No ST-T wave changes  Q Waves: none  Comparison to prior: Unchanged    Clinical Impression: normal EKG               Results for orders placed or performed during the hospital encounter of 08/04/24 (from the past 24 hour(s))   CBC with Platelets & Differential    Narrative    The following orders were created for panel order CBC with Platelets & Differential.  Procedure                               Abnormality         Status                     ---------                               -----------         ------                     CBC with platelets and d...[[id number]]  Abnormal            Final result                 Please view results for these tests on the individual orders.   Basic metabolic panel   Result Value Ref Range    Sodium 139 135 - 145 mmol/L    Potassium 3.6 3.4 - 5.3 mmol/L    Chloride 98 98 - 107 mmol/L    Carbon Dioxide (CO2) 20 (L) 22 - 29 mmol/L    Anion Gap 21 (H) 7 - 15 mmol/L    Urea Nitrogen 8.5 6.0 - 20.0 mg/dL    Creatinine 0.77 0.67 - 1.17 mg/dL    GFR Estimate >90 >60 mL/min/1.73m2    Calcium 9.2 8.8 - 10.4 mg/dL    Glucose 95 70 - 99 mg/dL   Troponin T, High Sensitivity   Result Value Ref Range    Troponin T, High Sensitivity 11 <=22 ng/L   [place] Draw    Narrative    The following orders were created for panel order [place] Draw.  Procedure                               Abnormality         Status                     ---------                               -----------         ------                     Extra Heparinized Syringe[[id number]]                        Final result                 Please view results for these tests on the individual orders.   CBC with platelets and differential   Result Value Ref Range    WBC Count 7.2 4.0 - 11.0 10e3/uL    RBC Count 4.57 4.40 - 5.90 10e6/uL    Hemoglobin 16.3 13.3 - 17.7 g/dL    Hematocrit 44.6 40.0 - 53.0 %    MCV 98 78 - 100 fL    MCH 35.7 (H) 26.5 - 33.0 pg    MCHC 36.5 31.5 - 36.5 g/dL    RDW 12.3 10.0 - 15.0 %    Platelet Count  190 150 - 450 10e3/uL    % Neutrophils 70 %    % Lymphocytes 21 %    % Monocytes 8 %    % Eosinophils 0 %    % Basophils 1 %    % Immature Granulocytes 0 %    NRBCs per 100 WBC 0 <1 /100    Absolute Neutrophils 5.1 1.6 - 8.3 10e3/uL    Absolute Lymphocytes 1.5 0.8 - 5.3 10e3/uL    Absolute Monocytes 0.6 0.0 - 1.3 10e3/uL    Absolute Eosinophils 0.0 0.0 - 0.7 10e3/uL    Absolute Basophils 0.0 0.0 - 0.2 10e3/uL    Absolute Immature Granulocytes 0.0 <=0.4 10e3/uL    Absolute NRBCs 0.0 10e3/uL   Extra Heparinized Syringe   Result Value Ref Range    Hold Specimen IN LAB        Medications   sodium chloride 0.9% BOLUS 1,000 mL (0 mLs Intravenous Stopped 8/4/24 1739)   LORazepam (ATIVAN) injection 1 mg (1 mg Intravenous $Given 8/4/24 1801)       Assessments & Plan (with Medical Decision Making)  49-year-old male who presented to the emergency department secondary to racing hearts feeling jittery, palpitations without irregularity and no significant chest discomfort.  He did have some tingling of his left arm that resolved.  He was drinking alcohol most of the weekend at an outdoor music festival and is on his way home today.  He got plenty of sun but does not feel dehydrated as he is still urinating and has been drinking water.  He clearly had excessive sun exposure as he is got a significant sunburn on his face today.  I think his symptoms are most likely combination of alcohol use over the weekend, sun exposure, lack of enough water intake to make up for the alcohol and sun exposure.  IV was established, IV fluids given.  ECG shows normal sinus rhythm without any EKG changes consistent with ischemia.  He does get rightward axis which was there before.  Troponin is negative.  Labs are unremarkable.  Patient was reevaluated after treatment with IV fluids.  He declined Ativan.  Patient was reevaluated after IV fluids.  Troponin is negative.  I discussed options with him including repeating the troponin 2 hours after the  first 1.  He is not really having any chest pain and he has reasons to be jittery with racing heart including alcohol use sun exposure dehydration.  I given the option to try Ativan and see if that makes him feel better and he wanted to proceed with that rather than wait for second troponin or be discharged at this time.  That was ordered.  Patient was markedly better after IV Ativan.  He fell asleep and no longer felt jittery.  He normally does not have these Feelings on a regular basis with anxiety and jittery feeling so I do think is probably related to alcohol use, sun exposure, mild dehydration.  Patient was discharged home in improved condition..  Return to ER precautions and follow-up precautions discussed.  All questions answered prior to discharge.     I have reviewed the nursing notes.    I have reviewed the findings, diagnosis, plan and need for follow up with the patient.        New Prescriptions    No medications on file       Final diagnoses:   Palpitations       8/4/2024   [facility] EMERGENCY DEPT       Sean Pedraza MD  08/04/24 [id number]

## 2024-08-04 NOTE — DISCHARGE INSTRUCTIONS
Return to the emergency department if you develop new or worsening symptoms.  I think your symptoms were combination of sun exposure, alcohol use, mild dehydration.  Your heart was a bit racing when you got here and your blood pressure was high.  Rest drink plenty of water and try to get some extra sleep tonight.  Return to the ER if the chest pressure returns.  It was a pleasure to meet you.

## 2024-08-04 NOTE — ED TRIAGE NOTES
"Patient reports he was driving home from [employer] this afternoon at 1430 and started having palpitations. Denies any pain/discomfort, feels \"jittery\".      Triage Assessment (Adult)       Row Name 08/04/24 1618          Triage Assessment    Airway WDL WDL        Respiratory WDL    Respiratory WDL WDL        Skin Circulation/Temperature WDL    Skin Circulation/Temperature WDL WDL                     "

## 2024-08-05 ENCOUNTER — PATIENT OUTREACH (OUTPATIENT)
Dept: FAMILY MEDICINE | Facility: OTHER | Age: 49
End: 2024-08-05

## 2024-08-05 ENCOUNTER — LAB (OUTPATIENT)
Dept: INFUSION THERAPY | Facility: CLINIC | Age: 49
End: 2024-08-05
Attending: INTERNAL MEDICINE
Payer: COMMERCIAL

## 2024-08-05 VITALS
OXYGEN SATURATION: 97 % | SYSTOLIC BLOOD PRESSURE: 162 MMHG | TEMPERATURE: 97.9 F | HEART RATE: 89 BPM | WEIGHT: 183.1 LBS | DIASTOLIC BLOOD PRESSURE: 104 MMHG | BODY MASS INDEX: 27.03 KG/M2

## 2024-08-05 DIAGNOSIS — E83.19 IRON OVERLOAD: ICD-10-CM

## 2024-08-05 DIAGNOSIS — R79.89 HIGH SERUM FERRITIN: Primary | ICD-10-CM

## 2024-08-05 DIAGNOSIS — E03.8 SUBCLINICAL HYPOTHYROIDISM: ICD-10-CM

## 2024-08-05 LAB
BASOPHILS # BLD AUTO: 0 10E3/UL (ref 0–0.2)
BASOPHILS NFR BLD AUTO: 0 %
EOSINOPHIL # BLD AUTO: 0.1 10E3/UL (ref 0–0.7)
EOSINOPHIL NFR BLD AUTO: 1 %
ERYTHROCYTE [DISTWIDTH] IN BLOOD BY AUTOMATED COUNT: 12.1 % (ref 10–15)
FERRITIN SERPL-MCNC: 453 NG/ML (ref 31–409)
HCT VFR BLD AUTO: 47.3 % (ref 40–53)
HGB BLD-MCNC: 16.7 G/DL (ref 13.3–17.7)
HOLD SPECIMEN: NORMAL
IMM GRANULOCYTES # BLD: 0 10E3/UL
IMM GRANULOCYTES NFR BLD: 0 %
LYMPHOCYTES # BLD AUTO: 1.8 10E3/UL (ref 0.8–5.3)
LYMPHOCYTES NFR BLD AUTO: 21 %
MCH RBC QN AUTO: 35.2 PG (ref 26.5–33)
MCHC RBC AUTO-ENTMCNC: 35.3 G/DL (ref 31.5–36.5)
MCV RBC AUTO: 100 FL (ref 78–100)
MONOCYTES # BLD AUTO: 0.7 10E3/UL (ref 0–1.3)
MONOCYTES NFR BLD AUTO: 9 %
NEUTROPHILS # BLD AUTO: 5.7 10E3/UL (ref 1.6–8.3)
NEUTROPHILS NFR BLD AUTO: 69 %
NRBC # BLD AUTO: 0 10E3/UL
NRBC BLD AUTO-RTO: 0 /100
PLATELET # BLD AUTO: 201 10E3/UL (ref 150–450)
RBC # BLD AUTO: 4.74 10E6/UL (ref 4.4–5.9)
WBC # BLD AUTO: 8.4 10E3/UL (ref 4–11)

## 2024-08-05 PROCEDURE — 84439 ASSAY OF FREE THYROXINE: CPT

## 2024-08-05 PROCEDURE — 85025 COMPLETE CBC W/AUTO DIFF WBC: CPT

## 2024-08-05 PROCEDURE — 36415 COLL VENOUS BLD VENIPUNCTURE: CPT

## 2024-08-05 PROCEDURE — 99207 PR NO CHARGE LOS: CPT

## 2024-08-05 PROCEDURE — 84443 ASSAY THYROID STIM HORMONE: CPT

## 2024-08-05 PROCEDURE — 99195 PHLEBOTOMY: CPT

## 2024-08-05 PROCEDURE — 82728 ASSAY OF FERRITIN: CPT

## 2024-08-05 RX ORDER — HEPARIN SODIUM,PORCINE 10 UNIT/ML
5-20 VIAL (ML) INTRAVENOUS DAILY PRN
Status: CANCELLED | OUTPATIENT
Start: 2024-09-01

## 2024-08-05 RX ORDER — HEPARIN SODIUM (PORCINE) LOCK FLUSH IV SOLN 100 UNIT/ML 100 UNIT/ML
5 SOLUTION INTRAVENOUS
Status: CANCELLED | OUTPATIENT
Start: 2024-09-01

## 2024-08-05 NOTE — TELEPHONE ENCOUNTER
Transitions of Care Outreach  Chief Complaint   Patient presents with    Hospital F/U       Most Recent Admission Date: 8/4/2024   Most Recent Admission Diagnosis:      Most Recent Discharge Date: 8/4/2024   Most Recent Discharge Diagnosis: Palpitations - R00.2       Gloria Arthur RN

## 2024-08-05 NOTE — PROGRESS NOTES
"Prior to scheduled phlebotomy verified patient identity using patient's name and date of birth.  Lab(s) verified: Hgb and Ferritin    Peripheral Access:  Accessed: right antecubital space with 20ga autoguard instyte catheter without difficulty.  Positive blood return.  No redness, edema or complaints of discomfort.  Pt tolerated phlebotomy, 500 mls of blood removed via gravity using whole blood collection bag and scale per MD's order.  480 mls of oral fluid replaced.  Pt tolerated procedure without adverse reactions.  Pt denies headache, nausea or discomfort.  IV flushed with 10mls 0.9% Normal Saline after completion of procedure and discontinued per policy with pressure dressing applied.      Pre Vital Signs and Post Vital Signs including O2 saturation documented in \"Vitals\"    Went to ER yesterday and ER doctor states patient's jittery symptoms were \"probably related to alcohol use, sun exposure,  mild dehydration. BP elevated today.  Has been elevated in the past too. Patient states he will inform his PCP Yeny Guerra about ER visit and BP readings.    Reviewed and provided discharge instructions regarding therapeutic phlebotomy.  Pt verbalized understanding.    Reviewed appointments and copy of schedule given to pt.  Return to clinic in: 10/2/24.       Ambulation steady.  Pt alert and orientated upon discharge.          "

## 2024-08-06 ENCOUNTER — OFFICE VISIT (OUTPATIENT)
Dept: FAMILY MEDICINE | Facility: OTHER | Age: 49
End: 2024-08-06
Payer: COMMERCIAL

## 2024-08-06 ENCOUNTER — NURSE TRIAGE (OUTPATIENT)
Dept: FAMILY MEDICINE | Facility: OTHER | Age: 49
End: 2024-08-06

## 2024-08-06 VITALS
TEMPERATURE: 97.8 F | WEIGHT: 185 LBS | DIASTOLIC BLOOD PRESSURE: 80 MMHG | OXYGEN SATURATION: 97 % | HEART RATE: 82 BPM | RESPIRATION RATE: 20 BRPM | HEIGHT: 69 IN | SYSTOLIC BLOOD PRESSURE: 130 MMHG | BODY MASS INDEX: 27.4 KG/M2

## 2024-08-06 DIAGNOSIS — E03.8 SUBCLINICAL HYPOTHYROIDISM: Primary | ICD-10-CM

## 2024-08-06 DIAGNOSIS — Z87.891 PERSONAL HISTORY OF TOBACCO USE: ICD-10-CM

## 2024-08-06 DIAGNOSIS — R00.2 PALPITATIONS: Primary | ICD-10-CM

## 2024-08-06 DIAGNOSIS — E83.19 IRON OVERLOAD: ICD-10-CM

## 2024-08-06 DIAGNOSIS — E03.8 SUBCLINICAL HYPOTHYROIDISM: ICD-10-CM

## 2024-08-06 DIAGNOSIS — J45.40 MODERATE PERSISTENT ASTHMA WITHOUT COMPLICATION: ICD-10-CM

## 2024-08-06 DIAGNOSIS — Z12.2 ENCOUNTER FOR SCREENING FOR LUNG CANCER: ICD-10-CM

## 2024-08-06 DIAGNOSIS — R03.0 ELEVATED BLOOD PRESSURE READING WITHOUT DIAGNOSIS OF HYPERTENSION: ICD-10-CM

## 2024-08-06 LAB
T4 FREE SERPL-MCNC: 1.13 NG/DL (ref 0.9–1.7)
TSH SERPL DL<=0.005 MIU/L-ACNC: 6.51 UIU/ML (ref 0.3–4.2)

## 2024-08-06 PROCEDURE — G0296 VISIT TO DETERM LDCT ELIG: HCPCS | Mod: 4MD

## 2024-08-06 PROCEDURE — 99215 OFFICE O/P EST HI 40 MIN: CPT

## 2024-08-06 PROCEDURE — G2211 COMPLEX E/M VISIT ADD ON: HCPCS

## 2024-08-06 ASSESSMENT — ASTHMA QUESTIONNAIRES
ACT_TOTALSCORE: 19
ACT_TOTALSCORE: 19
QUESTION_3 LAST FOUR WEEKS HOW OFTEN DID YOUR ASTHMA SYMPTOMS (WHEEZING, COUGHING, SHORTNESS OF BREATH, CHEST TIGHTNESS OR PAIN) WAKE YOU UP AT NIGHT OR EARLIER THAN USUAL IN THE MORNING: NOT AT ALL
QUESTION_4 LAST FOUR WEEKS HOW OFTEN HAVE YOU USED YOUR RESCUE INHALER OR NEBULIZER MEDICATION (SUCH AS ALBUTEROL): ONE OR TWO TIMES PER DAY
QUESTION_1 LAST FOUR WEEKS HOW MUCH OF THE TIME DID YOUR ASTHMA KEEP YOU FROM GETTING AS MUCH DONE AT WORK, SCHOOL OR AT HOME: NONE OF THE TIME
QUESTION_2 LAST FOUR WEEKS HOW OFTEN HAVE YOU HAD SHORTNESS OF BREATH: THREE TO SIX TIMES A WEEK
QUESTION_5 LAST FOUR WEEKS HOW WOULD YOU RATE YOUR ASTHMA CONTROL: WELL CONTROLLED

## 2024-08-06 ASSESSMENT — PAIN SCALES - GENERAL: PAINLEVEL: NO PAIN (0)

## 2024-08-06 NOTE — PROGRESS NOTES
Assessment & Plan  1. Palpitations  Patient is a 49 year old male with hemochromatosis and current everyday smoker presenting for ED follow-up. ED notes and diagnostics reviewed. Heart normal rate and rhythm, S1/S2. No extrasystoles appreciated. Denies exertional chest pain, dyspnea, or orthopnea. Ordered 14-day ZIO for further evaluation of heart rate and rhythm given new onset symptoms. Follow-up if symptoms fail to improve despite above interventions. Patient understands and is agreeable to plan as discussed in clinic.   - ZIO PATCH MAIL OUT; Future    2. Elevated blood pressure reading without diagnosis of hypertension  Advised to check at home consistently daily for the next week to determine if blood pressure is remaining elevated. Within normal limits during encounter but elevated the past two days.     3. Subclinical hypothyroidism  TSH within normal limits when last checked 5 months ago. Added on TSH to ensure that this is not contributing to current symptoms.   - TSH with free T4 reflex; Future    4. Moderate persistent asthma without complication  Taking Advair as needed based on symptoms. Advised that this is a daily medication and is used for maintenance and prevention of acute exacerbations.     5. Iron overload  Active therapy plans for therapeutic phlebotomy. Iron and hemoglobin levels within normal limits on most recent testing.     6. Encounter for screening for lung cancer  7. Personal history of tobacco use  Roughly 20 pack year history. Current everyday smoking. In the process of reducing and quitting smoking. Plan for CT scan for screening. Could yield incidental findings of coronary arteries in addition to the lung cancer screening.   - Prof fee: Shared Decision Making for Lung Cancer Screening  - CT Chest Lung Cancer Scrn Low Dose wo; Future    Total time spent today for this visit is 51 minutes including precharting, patient interview, exam, review of labs/imaging, developing plan together  with the patient, and medical documentation.    Alma Covarrubias is a 49 year old, presenting for the following health issues:  ER Follow up      8/6/2024    10:50 AM   Additional Questions   Roomed by Shila VARMA     History of Present Illness       Reason for visit:  Blood presure  Symptom onset:  1-3 days ago  Symptom intensity:  Moderate  Symptom progression:  Improving  Had these symptoms before:  No    He eats 2-3 servings of fruits and vegetables daily.He consumes 3 sweetened beverage(s) daily.He exercises with enough effort to increase his heart rate 9 or less minutes per day.  He exercises with enough effort to increase his heart rate 3 or less days per week.   He is taking medications regularly.    ED/UC Followup:    Facility:  [facility] ED   Date of visit: 8/4/2024  Reason for visit: Palpitations/ HTN  Current Status: same.     8/4/24-Assessments & Plan (with Medical Decision Making)  49-year-old male who presented to the emergency department secondary to racing hearts feeling jittery, palpitations without irregularity and no significant chest discomfort.  He did have some tingling of his left arm that resolved.  He was drinking alcohol most of the weekend at an outdoor music festival and is on his way home today.  He got plenty of sun but does not feel dehydrated as he is still urinating and has been drinking water.  He clearly had excessive sun exposure as he is got a significant sunburn on his face today.  I think his symptoms are most likely combination of alcohol use over the weekend, sun exposure, lack of enough water intake to make up for the alcohol and sun exposure.  IV was established, IV fluids given.  ECG shows normal sinus rhythm without any EKG changes consistent with ischemia.  He does get rightward axis which was there before.  Troponin is negative.  Labs are unremarkable.  Patient was reevaluated after treatment with IV fluids.  He declined Ativan.    Patient was reevaluated after IV  fluids.  Troponin is negative.  I discussed options with him including repeating the troponin 2 hours after the first 1.  He is not really having any chest pain and he has reasons to be jittery with racing heart including alcohol use sun exposure dehydration.  I given the option to try Ativan and see if that makes him feel better and he wanted to proceed with that rather than wait for second troponin or be discharged at this time.  That was ordered.    Patient was markedly better after IV Ativan.  He fell asleep and no longer felt jittery.  He normally does not have these Feelings on a regular basis with anxiety and jittery feeling so I do think is probably related to alcohol use, sun exposure, mild dehydration.  Patient was discharged home in improved condition..  Return to ER precautions and follow-up precautions discussed.  All questions answered prior to discharge.    Presents for ED follow-up. Was seen on 8/4/24 for chest tightness, dizziness, and palpitations. Labs and EKG normal in ED. No signs of cardiac ischemia. Troponin negative. Concerned because he is still experiencing headaches, foggy feeling, and palpitations. Last occurrence was yesterday when clearing out the RV. Symptoms lasted a few seconds to a minute. Blood pressure elevated during ED encounter and during therapeutic phlebotomy appointment yesterday. No history of hypertension. No family history of heart disease, hyperlipidemia, or CAD.     Endorses increased alcohol use over the last week due to being at [employer]. Reports that diet has not been ideal as of late. History to tobacco use. Started smoking at age 13. Smoked 0.5 to 1 pack per day and quit at age 30. Restarted smoking at age 41 and has been averaging 1.5 to 2 packs per day. Has been slowly decreasing use in hopes for quitting. Has not had lung cancer screening completed prior.      Denies exertional chest pain, dyspnea, orthopnea.       Objective    /80   Pulse 82   Temp 97.8  " F (36.6  C) (Temporal)   Resp 20   Ht 1.76 m (5' 9.29\")   Wt 83.9 kg (185 lb)   SpO2 97%   BMI 27.09 kg/m    Body mass index is 27.09 kg/m .  Physical Exam  Vitals reviewed.   Constitutional:       General: He is not in acute distress.     Appearance: Normal appearance. He is not ill-appearing.   HENT:      Head: Normocephalic and atraumatic.      Right Ear: Tympanic membrane, ear canal and external ear normal.      Left Ear: Tympanic membrane, ear canal and external ear normal.      Ears:      Comments: Negative for middle ear effusion, TM injection, bulging, and erythema bilaterally.     Mouth/Throat:      Pharynx: Oropharynx is clear. No oropharyngeal exudate or posterior oropharyngeal erythema.   Eyes:      Conjunctiva/sclera: Conjunctivae normal.      Right eye: Right conjunctiva is not injected. No exudate.     Left eye: Left conjunctiva is not injected. No exudate.     Pupils: Pupils are equal, round, and reactive to light.   Neck:      Thyroid: No thyroid mass.      Vascular: No carotid bruit.   Cardiovascular:      Rate and Rhythm: Normal rate and regular rhythm.      Heart sounds: Normal heart sounds, S1 normal and S2 normal. No murmur heard.  Pulmonary:      Effort: Pulmonary effort is normal.      Breath sounds: Normal breath sounds. No wheezing.   Musculoskeletal:      Right lower leg: No edema.      Left lower leg: No edema.   Lymphadenopathy:      Cervical: No cervical adenopathy.   Skin:     General: Skin is warm and dry.      Capillary Refill: Capillary refill takes less than 2 seconds.      Findings: No lesion or rash.   Neurological:      Mental Status: He is alert.   Psychiatric:         Attention and Perception: Attention and perception normal.         Mood and Affect: Mood and affect normal.     Labs pending  CT pending  Lab on 08/05/2024   Component Date Value Ref Range Status    Ferritin 08/05/2024 453 (H)  31 - 409 ng/mL Final    WBC Count 08/05/2024 8.4  4.0 - 11.0 10e3/uL Final    " RBC Count 08/05/2024 4.74  4.40 - 5.90 10e6/uL Final    Hemoglobin 08/05/2024 16.7  13.3 - 17.7 g/dL Final    Hematocrit 08/05/2024 47.3  40.0 - 53.0 % Final    MCV 08/05/2024 100  78 - 100 fL Final    MCH 08/05/2024 35.2 (H)  26.5 - 33.0 pg Final    MCHC 08/05/2024 35.3  31.5 - 36.5 g/dL Final    RDW 08/05/2024 12.1  10.0 - 15.0 % Final    Platelet Count 08/05/2024 201  150 - 450 10e3/uL Final    % Neutrophils 08/05/2024 69  % Final    % Lymphocytes 08/05/2024 21  % Final    % Monocytes 08/05/2024 9  % Final    % Eosinophils 08/05/2024 1  % Final    % Basophils 08/05/2024 0  % Final    % Immature Granulocytes 08/05/2024 0  % Final    NRBCs per 100 WBC 08/05/2024 0  <1 /100 Final    Absolute Neutrophils 08/05/2024 5.7  1.6 - 8.3 10e3/uL Final    Absolute Lymphocytes 08/05/2024 1.8  0.8 - 5.3 10e3/uL Final    Absolute Monocytes 08/05/2024 0.7  0.0 - 1.3 10e3/uL Final    Absolute Eosinophils 08/05/2024 0.1  0.0 - 0.7 10e3/uL Final    Absolute Basophils 08/05/2024 0.0  0.0 - 0.2 10e3/uL Final    Absolute Immature Granulocytes 08/05/2024 0.0  <=0.4 10e3/uL Final    Absolute NRBCs 08/05/2024 0.0  10e3/uL Final    Hold Specimen 08/05/2024 [facility]   Final         Signed Electronically by: BEATRIZ Gardner CNP  Lung Cancer Screening Shared Decision Making Visit     Karson Guzman, a 49 year old male, is eligible for lung cancer screening    History   Smoking Status    Some Days    Types: Cigarettes   Smokeless Tobacco    Never       I have discussed with patient the risks and benefits of screening for lung cancer with low-dose CT.     The risks include:    radiation exposure: one low dose chest CT has as much ionizing radiation as about 15 chest x-rays, or 6 months of background radiation living in [place]      false positives: most findings/nodules are NOT cancer, but some might still require additional diagnostic evaluation, including biopsy    over-diagnosis: some slow growing cancers that might never have been  clinically significant will be detected and treated unnecessarily     The benefit of early detection of lung cancer is contingent upon adherence to annual screening or more frequent follow up if indicated.     Furthermore, to benefit from screening, Karson must be willing and able to undergo diagnostic procedures, if indicated. Although no specific guide is available for determining severity of comorbidities, it is reasonable to withhold screening in patients who have greater mortality risk from other diseases.     We did discuss that the best way to prevent lung cancer is to not smoke.    Some patients may value a numeric estimation of lung cancer risk when evaluating if lung cancer screening is right for them, here is one calculator:    ShouldIScreen

## 2024-08-06 NOTE — PATIENT INSTRUCTIONS
Given the ongoing symptoms and recent ED visit, I have placed an order for a 14-day ZIO patch. This will be mailed to your home with directions for application. The ZIO patch will help determine if there is an abnormal heart rhythm contributing to your symptoms. I have added on a thyroid test to recent blood work to ensure that this is not contributing to symptoms.     You are eligible for lung cancer screening due to your age and smoking history. This is done through low-dose CT scanning. Please call [phone] to schedule your imaging study. This study may also incidently  abnormalities in the heart.     Please  a blood pressure machine at your local pharmacy. Check daily. Choose a time to check when you are most relaxed. Please send a [employer] message in 1 week with results of home blood pressure readings.     Lung Cancer Screening   Frequently Asked Questions  If you are at high-risk for lung cancer, getting screened with low-dose computed tomography (LDCT) every year can help save your life. This handout offers answers to some of the most common questions about lung cancer screening. If you have other questions, please call [phone] ([phone]).     What is it?  Lung cancer screening uses special X-ray technology to create an image of your lung tissue. The exam is quick and easy and takes less than 10 seconds. We don t give you any medicine or use any needles. You can eat before and after the exam. You don t need to change your clothes as long as the clothing on your chest doesn t contain metal. But, you do need to be able to hold your breath for at least 6 seconds during the exam.    What is the goal of lung cancer screening?  The goal of lung cancer screening is to save lives. Many times, lung cancer is not found until a person starts having physical symptoms. Lung cancer screening can help detect lung cancer in the earliest stages when it may be easier to treat.    Who should be  screened for lung cancer?  We suggest lung cancer screening for anyone who is at high-risk for lung cancer. You are in the high-risk group if you:     are between the ages of 55 and 79, and   have smoked at least 1 pack of cigarettes a day for 20 or more years, and   still smoke or have quit within the past 15 years.    However, if you have a new cough or shortness of breath, you should talk to your doctor before being screened.    Why does it matter if I have symptoms?  Certain symptoms can be a sign that you have a condition in your lungs that should be checked and treated by your doctor. These symptoms include fever, chest pain, a new or changing cough, shortness of breath that you have never felt before, coughing up blood or unexplained weight loss. Having any of these symptoms can greatly affect the results of lung cancer screening.       Should all smokers get an LDCT lung cancer screening exam?  It depends. Lung cancer screening is for a very specific group of men and women who have a history of heavy smoking over a long period of time (see  Who should be screened for lung cancer  above).  I am in the high-risk group, but have been diagnosed with cancer in the past. Is LDCT lung cancer screening right for me?  In some cases, you should not have LDCT lung screening, such as when your doctor is already following your cancer with CT scan studies. Your doctor will help you decide if LDCT lung screening is right for you.  Do I need to have a screening exam every year?  Yes. If you are in the high-risk group described earlier, you should get an LDCT lung cancer screening exam every year until you are 79, or are no longer willing or able to undergo screening and possible procedures to diagnose and treat lung cancer.  How effective is LDCT at preventing death from lung cancer?  Studies have shown that LDCT lung cancer screening can lower the risk of death from lung cancer by 20 percent in people who are at  high-risk.  What are the risks?  There are some risks and limitations of LDCT lung cancer screening. We want to make sure you understand the risks and benefits, so please let us know if you have any questions. Your doctor may want to talk with you more about these risks.   Radiation exposure: As with any exam that uses radiation, there is a very small increased risk of cancer. The amount of radiation in LDCT is small--about the same amount a person would get from a mammogram. Your doctor orders the exam when he or she feels the potential benefits outweigh the risks.   False negatives: No test is perfect, including LDCT. It is possible that you may have a medical condition, including lung cancer, that is not found during your exam. This is called a false negative result.   False positives and more testing: LDCT very often finds something in the lung that could be cancer, but in fact is not. This is called a false positive result. False positive tests often cause anxiety. To make sure these findings are not cancer, you may need to have more tests. These tests will be done only if you give us permission. Sometimes patients need a treatment that can have side effects, such as a biopsy. For more information on false positives, see  What can I expect from the results?    Findings not related to lung cancer: Your LDCT exam also takes pictures of areas of your body next to your lungs. In a very small number of cases, the CT scan will show an abnormal finding in one of these areas, such as your kidneys, adrenal glands, liver or thyroid. This finding may not be serious, but you may need more tests. Your doctor can help you decide what other tests you may need, if any.  What can I expect from the results?  About 1 out of 4 LDCT exams will find something that may need more tests. Most of the time, these findings are lung nodules. Lung nodules are very small collections of tissue in the lung. These nodules are very common, and  the vast majority--more than 97 percent--are not cancer (benign). Most are normal lymph nodes or small areas of scarring from past infections.  But, if a small lung nodule is found to be cancer, the cancer can be cured more than 90 percent of the time. To know if the nodule is cancer, we may need to get more images before your next yearly screening exam. If the nodule has suspicious features (for example, it is large, has an odd shape or grows over time), we will refer you to a specialist for further testing.  Will my doctor also get the results?  Yes. Your doctor will get a copy of your results.  Is it okay to keep smoking now that there s a cancer screening exam?  No. Tobacco is one of the strongest cancer-causing agents. It causes not only lung cancer, but other cancers and cardiovascular (heart) diseases as well. The damage caused by smoking builds over time. This means that the longer you smoke, the higher your risk of disease. While it is never too late to quit, the sooner you quit, the better.  Where can I find help to quit smoking?  The best way to prevent lung cancer is to stop smoking. If you have already quit smoking, congratulations and keep it up! For help on quitting smoking, please call [employer] at 4-656-QUITNOW (1-377.647.1781) or the American [employer] Society at 1-109.409.1761 to find local resources near you.  One-on-one health coaching:  If you d prefer to work individually with a health care provider on tobacco cessation, we offer:     Medication Therapy Management:  Our specially trained pharmacists work closely with you and your doctor to help you quit smoking.  Call [phone] or [phone] (toll free).

## 2024-08-06 NOTE — TELEPHONE ENCOUNTER
"Nurse Triage SBAR    Is this a 2nd Level Triage? YES, LICENSED PRACTITIONER REVIEW IS REQUIRED    Situation: Patient seen 8/4/2024 for palpitations.    Background: Patient seen in ED related to elevated BP, ECG was normal, Troponin negative.    Assessment: Patient has office visit scheduled for follow up today with [facility], patient feels \"spacey\", has not taking BP today, denies any chest pain, reviewed ED notes, advised patient to take BP and if elevated with any confusion to be seen in ED for further evaluation. Patient advised to follow up in clinic if HR is within normal limits and BP is not elevated. Patient verbalized understanding and in agreement with plan.      Protocol Recommended Disposition:   See in Office Today    Recommendation: Advised ED if elevated HR and BP prior to office visit today at 11:00 am.      Office visit scheduled.    Does the patient meet one of the following criteria for ADS visit consideration? No    Reason for Disposition   Patient wants to be seen    Protocols used: Blood Pressure - High-A-OH    "

## 2024-08-07 ENCOUNTER — APPOINTMENT (OUTPATIENT)
Dept: CT IMAGING | Facility: CLINIC | Age: 49
End: 2024-08-07
Attending: STUDENT IN AN ORGANIZED HEALTH CARE EDUCATION/TRAINING PROGRAM
Payer: COMMERCIAL

## 2024-08-07 ENCOUNTER — NURSE TRIAGE (OUTPATIENT)
Dept: FAMILY MEDICINE | Facility: OTHER | Age: 49
End: 2024-08-07
Payer: COMMERCIAL

## 2024-08-07 ENCOUNTER — ORDERS ONLY (AUTO-RELEASED) (OUTPATIENT)
Dept: FAMILY MEDICINE | Facility: OTHER | Age: 49
End: 2024-08-07

## 2024-08-07 ENCOUNTER — APPOINTMENT (OUTPATIENT)
Dept: MRI IMAGING | Facility: CLINIC | Age: 49
End: 2024-08-07
Attending: STUDENT IN AN ORGANIZED HEALTH CARE EDUCATION/TRAINING PROGRAM
Payer: COMMERCIAL

## 2024-08-07 ENCOUNTER — HOSPITAL ENCOUNTER (EMERGENCY)
Facility: CLINIC | Age: 49
Discharge: HOME | End: 2024-08-07
Attending: STUDENT IN AN ORGANIZED HEALTH CARE EDUCATION/TRAINING PROGRAM | Admitting: STUDENT IN AN ORGANIZED HEALTH CARE EDUCATION/TRAINING PROGRAM
Payer: COMMERCIAL

## 2024-08-07 VITALS
DIASTOLIC BLOOD PRESSURE: 92 MMHG | SYSTOLIC BLOOD PRESSURE: 132 MMHG | OXYGEN SATURATION: 99 % | TEMPERATURE: 98.5 F | BODY MASS INDEX: 27.55 KG/M2 | RESPIRATION RATE: 20 BRPM | WEIGHT: 186 LBS | HEART RATE: 77 BPM | HEIGHT: 69 IN

## 2024-08-07 DIAGNOSIS — R20.2 NUMBNESS AND TINGLING: ICD-10-CM

## 2024-08-07 DIAGNOSIS — H53.9 VISION CHANGES: ICD-10-CM

## 2024-08-07 DIAGNOSIS — R00.2 PALPITATIONS: ICD-10-CM

## 2024-08-07 DIAGNOSIS — R20.0 NUMBNESS AND TINGLING: ICD-10-CM

## 2024-08-07 DIAGNOSIS — R42 DIZZINESS: ICD-10-CM

## 2024-08-07 LAB
ANION GAP SERPL CALCULATED.3IONS-SCNC: 11 MMOL/L (ref 7–15)
APTT PPP: 29 SECONDS (ref 22–38)
BASOPHILS # BLD AUTO: 0 10E3/UL (ref 0–0.2)
BASOPHILS NFR BLD AUTO: 1 %
BUN SERPL-MCNC: 11.1 MG/DL (ref 6–20)
CALCIUM SERPL-MCNC: 8.9 MG/DL (ref 8.8–10.4)
CHLORIDE SERPL-SCNC: 103 MMOL/L (ref 98–107)
CREAT SERPL-MCNC: 0.81 MG/DL (ref 0.67–1.17)
EGFRCR SERPLBLD CKD-EPI 2021: >90 ML/MIN/1.73M2
EOSINOPHIL # BLD AUTO: 0.2 10E3/UL (ref 0–0.7)
EOSINOPHIL NFR BLD AUTO: 3 %
ERYTHROCYTE [DISTWIDTH] IN BLOOD BY AUTOMATED COUNT: 12.2 % (ref 10–15)
GLUCOSE SERPL-MCNC: 93 MG/DL (ref 70–99)
HCO3 SERPL-SCNC: 25 MMOL/L (ref 22–29)
HCT VFR BLD AUTO: 40.2 % (ref 40–53)
HGB BLD-MCNC: 14.2 G/DL (ref 13.3–17.7)
IMM GRANULOCYTES # BLD: 0 10E3/UL
IMM GRANULOCYTES NFR BLD: 0 %
INR PPP: 0.93 (ref 0.85–1.15)
LYMPHOCYTES # BLD AUTO: 2.4 10E3/UL (ref 0.8–5.3)
LYMPHOCYTES NFR BLD AUTO: 42 %
MCH RBC QN AUTO: 35.7 PG (ref 26.5–33)
MCHC RBC AUTO-ENTMCNC: 35.3 G/DL (ref 31.5–36.5)
MCV RBC AUTO: 101 FL (ref 78–100)
MONOCYTES # BLD AUTO: 0.5 10E3/UL (ref 0–1.3)
MONOCYTES NFR BLD AUTO: 9 %
NEUTROPHILS # BLD AUTO: 2.6 10E3/UL (ref 1.6–8.3)
NEUTROPHILS NFR BLD AUTO: 45 %
NRBC # BLD AUTO: 0 10E3/UL
NRBC BLD AUTO-RTO: 0 /100
PLATELET # BLD AUTO: 164 10E3/UL (ref 150–450)
POTASSIUM SERPL-SCNC: 3.8 MMOL/L (ref 3.4–5.3)
RBC # BLD AUTO: 3.98 10E6/UL (ref 4.4–5.9)
SODIUM SERPL-SCNC: 139 MMOL/L (ref 135–145)
TROPONIN T SERPL HS-MCNC: 9 NG/L
WBC # BLD AUTO: 5.7 10E3/UL (ref 4–11)

## 2024-08-07 PROCEDURE — 85730 THROMBOPLASTIN TIME PARTIAL: CPT | Performed by: STUDENT IN AN ORGANIZED HEALTH CARE EDUCATION/TRAINING PROGRAM

## 2024-08-07 PROCEDURE — 70450 CT HEAD/BRAIN W/O DYE: CPT | Mod: XU

## 2024-08-07 PROCEDURE — 70553 MRI BRAIN STEM W/O & W/DYE: CPT

## 2024-08-07 PROCEDURE — 84484 ASSAY OF TROPONIN QUANT: CPT | Performed by: STUDENT IN AN ORGANIZED HEALTH CARE EDUCATION/TRAINING PROGRAM

## 2024-08-07 PROCEDURE — 255N000002 HC RX 255 OP 636: Performed by: STUDENT IN AN ORGANIZED HEALTH CARE EDUCATION/TRAINING PROGRAM

## 2024-08-07 PROCEDURE — 99284 EMERGENCY DEPT VISIT MOD MDM: CPT | Performed by: STUDENT IN AN ORGANIZED HEALTH CARE EDUCATION/TRAINING PROGRAM

## 2024-08-07 PROCEDURE — 80048 BASIC METABOLIC PNL TOTAL CA: CPT | Performed by: STUDENT IN AN ORGANIZED HEALTH CARE EDUCATION/TRAINING PROGRAM

## 2024-08-07 PROCEDURE — 85025 COMPLETE CBC W/AUTO DIFF WBC: CPT | Performed by: STUDENT IN AN ORGANIZED HEALTH CARE EDUCATION/TRAINING PROGRAM

## 2024-08-07 PROCEDURE — 36415 COLL VENOUS BLD VENIPUNCTURE: CPT | Performed by: STUDENT IN AN ORGANIZED HEALTH CARE EDUCATION/TRAINING PROGRAM

## 2024-08-07 PROCEDURE — A9585 GADOBUTROL INJECTION: HCPCS | Performed by: STUDENT IN AN ORGANIZED HEALTH CARE EDUCATION/TRAINING PROGRAM

## 2024-08-07 PROCEDURE — 250N000009 HC RX 250: Performed by: STUDENT IN AN ORGANIZED HEALTH CARE EDUCATION/TRAINING PROGRAM

## 2024-08-07 PROCEDURE — 82962 GLUCOSE BLOOD TEST: CPT

## 2024-08-07 PROCEDURE — 250N000011 HC RX IP 250 OP 636: Performed by: STUDENT IN AN ORGANIZED HEALTH CARE EDUCATION/TRAINING PROGRAM

## 2024-08-07 PROCEDURE — 99285 EMERGENCY DEPT VISIT HI MDM: CPT | Mod: 25 | Performed by: STUDENT IN AN ORGANIZED HEALTH CARE EDUCATION/TRAINING PROGRAM

## 2024-08-07 PROCEDURE — 70496 CT ANGIOGRAPHY HEAD: CPT

## 2024-08-07 PROCEDURE — 85610 PROTHROMBIN TIME: CPT | Performed by: STUDENT IN AN ORGANIZED HEALTH CARE EDUCATION/TRAINING PROGRAM

## 2024-08-07 RX ORDER — GADOBUTROL 604.72 MG/ML
8.5 INJECTION INTRAVENOUS ONCE
Status: COMPLETED | OUTPATIENT
Start: 2024-08-07 | End: 2024-08-07

## 2024-08-07 RX ORDER — IOPAMIDOL 755 MG/ML
500 INJECTION, SOLUTION INTRAVASCULAR ONCE
Status: COMPLETED | OUTPATIENT
Start: 2024-08-07 | End: 2024-08-07

## 2024-08-07 RX ADMIN — SODIUM CHLORIDE 100 ML: 9 INJECTION, SOLUTION INTRAVENOUS at 17:40

## 2024-08-07 RX ADMIN — IOPAMIDOL 67 ML: 755 INJECTION, SOLUTION INTRAVENOUS at 17:40

## 2024-08-07 RX ADMIN — GADOBUTROL 8.5 ML: 604.72 INJECTION INTRAVENOUS at 18:22

## 2024-08-07 ASSESSMENT — COLUMBIA-SUICIDE SEVERITY RATING SCALE - C-SSRS

## 2024-08-07 ASSESSMENT — ACTIVITIES OF DAILY LIVING (ADL)
ADLS_ACUITY_SCORE: 35
ADLS_ACUITY_SCORE: 35
ADLS_ACUITY_SCORE: 33

## 2024-08-07 NOTE — TELEPHONE ENCOUNTER
S-(situation): Vision changes, headache, Tachycardia    B-(background): Patient was seen in ED on 8/4 for heart palpitations. Labs all came back clear.     A-(assessment): Patient is having new symptoms. He has been experiencing blurred vision and tunneling vision, brain fog. He is having ongoing headaches and fast heart rates. He denies drinking alcohol since Saturday.    R-(recommendations): Per RN protocol, patient should be seen in ED. Patient is agreeable to going.     Additional Information   Negative: Difficult to awaken or acting confused (e.g., disoriented, slurred speech)   Negative: New neurologic deficit that is present NOW, sudden onset of ANY of the following: * Weakness of the face, arm, or leg on one side of the body* Numbness of the face, arm, or leg on one side of the body* Loss of speech or garbled speech   Negative: Sounds like a life-threatening emergency to the triager   Negative: SEVERE weakness (i.e., unable to walk or barely able to walk, requires support) and new-onset or worsening   Negative: Confusion, disorientation, or hallucinations is main symptom   Negative: Dizziness is main symptom   Negative: Followed a head injury within last 3 days    Protocols used: Neurologic Deficit-A-OH

## 2024-08-07 NOTE — Clinical Note
Karson Guzman was seen and treated in our emergency department on 8/7/2024.  He may return to work on 08/09/2024.       If you have any questions or concerns, please don't hesitate to call.      Oziel Keller MD

## 2024-08-07 NOTE — ED TRIAGE NOTES
PT presents with c/o Dizziness numbness and tingling to left side of the body that started last Sunday (3 days ago), blurry vision to both eyes off and on for over a month. PT sent email to his primary care provider with his symptoms and was advised to visit the ED for evaluation. PT concerned of stroke symptoms.

## 2024-08-07 NOTE — ED PROVIDER NOTES
History     Chief Complaint   Patient presents with    Dizziness    Eye Problem    Numbness     HPI  Karson Guzman is a 49 year old male with history of anxiety, hemochromatosis who presents for evaluation of dizziness, numbness, vision changes.  Patient describes having intermittent blurred vision to both eyes over the last several months.  This has been evaluated by his eye doctor and PCP without obvious cause.  Then over the past 3 days he describes having some palpitations, numbness to the left sided extremities, generalized headache, and blurred vision has worsened.  He was evaluated for palpitations on 8/4.  It was noted these started after attending an outdoor concert, being out in the sun all day, and consuming a fair amount of alcohol.  Labs, EKG were unremarkable.  He felt improved with Ativan.  Since discharge, he has noted some elevated blood pressure readings and also the symptoms described above.  Upon contacting his primary care clinic, it was recommended he present for reevaluation.  Patient denies recent flu/infectious symptoms, known sick contacts, any history of alcohol drawl, chest pain, focal weakness, other complaints.    Allergies:  Allergies   Allergen Reactions    Aspirin Unknown     As a kid     Problem List:    Patient Active Problem List    Diagnosis Date Noted    Iron overload 12/20/2023     Priority: Medium    Class 1 obesity due to excess calories without serious comorbidity with body mass index (BMI) of 31.0 to 31.9 in adult 10/14/2020     Priority: Medium    ALBERTO (obstructive sleep apnea) 01/13/2020     Priority: Medium     1/12/2020 [facility] (225.0 lbs) - AHI 15.9, RDI 34.7, Supine .7, REM AHI 14.8, Low O2 86.0%, Time Spent ?88% 0.1 minutes / Time Spent ?89% 0.1 minutes.      High serum ferritin 10/24/2019     Priority: Medium    Concussion without loss of consciousness 12/28/2016     Priority: Medium    Anxiety 04/07/2014     Priority: Medium     Nasal polyp      Priority: Medium     noted on left side.      Moderate persistent asthma      Priority: Medium    Allergy to mold spores      Priority: Medium     13 RAST pos. for: DM/M/G/RW      Seasonal allergic conjunctivitis      Priority: Medium    Seasonal allergic rhinitis      Priority: Medium    House dust mite allergy      Priority: Medium    Diagnostic skin and sensitization tests(aka ALLERGENS)      Priority: Medium    Hypovitaminosis D      Priority: Medium     noted 13, put on Rx      Mild intermittent asthma 10/01/2009     Priority: Medium    Pain in joint, multiple sites 2008     Priority: Medium     Overview:   Workup by rheum and neurology, see scans        Past Medical History:    Past Medical History:   Diagnosis Date    Allergy to mold spores     Diagnostic skin and sensitization tests 13 RAST pos. for: DM/M/G/RW    House dust mite allergy     Hypovitaminosis D     Moderate persistent asthma     Nasal polyp     ALBERTO (obstructive sleep apnea)     Seasonal allergic conjunctivitis     Seasonal allergic rhinitis      Past Surgical History:    Past Surgical History:   Procedure Laterality Date    COLONOSCOPY N/A 2019    Procedure: COLONOSCOPY;  Surgeon: Giovanni Greenfield MD;  Location: [facility]    HERNIORRHAPHY VENTRAL N/A 3/4/2015    Procedure: HERNIORRHAPHY VENTRAL;  Surgeon: Giovanni Greenfield MD;  Location: [facility]     Family History:    Family History   Problem Relation Age of Onset    Hypertension Mother     Cancer Father     Diabetes No family hx of     Hyperlipidemia No family hx of     Other Cancer No family hx of     Coronary Artery Disease No family hx of      Social History:  Marital Status:  Single [1]  Social History     Tobacco Use    Smoking status: Some Days     Current packs/day: 0.00     Types: Cigarettes     Last attempt to quit: 2003     Years since quittin.3     Passive exposure: Never    Smokeless tobacco: Never    Tobacco comments:     not  "everyday- can go months without, then may have a few   Vaping Use    Vaping status: Every Day   Substance Use Topics    Alcohol use: Yes     Comment: occ. use- per week use- varies 0-4    Drug use: No      Medications:    ADVAIR -21 MCG/ACT inhaler  albuterol (PROAIR HFA/PROVENTIL HFA/VENTOLIN HFA) 108 (90 Base) MCG/ACT inhaler  montelukast (SINGULAIR) 10 MG tablet  multivitamin w/minerals (THERA-VIT-M) tablet      Review of Systems   All other systems reviewed and are negative.  See HPI.    Physical Exam   BP: (!) 178/118  Pulse: 84  Temp: 98.5  F (36.9  C)  Resp: 20  Height: 175.3 cm (5' 9\")  Weight: 84.4 kg (186 lb)  SpO2: 97 %    Physical Exam  Vitals and nursing note reviewed.   Constitutional:       Appearance: Normal appearance. He is not ill-appearing or toxic-appearing.      Comments: Anxious.  Otherwise nontoxic.   HENT:      Head: Normocephalic and atraumatic.      Nose: Nose normal.      Mouth/Throat:      Mouth: Mucous membranes are moist.      Pharynx: Oropharynx is clear.   Eyes:      General: No scleral icterus.     Extraocular Movements: Extraocular movements intact.      Conjunctiva/sclera: Conjunctivae normal.      Pupils: Pupils are equal, round, and reactive to light.      Comments: No nystagmus.   Cardiovascular:      Rate and Rhythm: Normal rate and regular rhythm.      Pulses: Normal pulses.      Heart sounds: Normal heart sounds. No murmur heard.  Pulmonary:      Effort: Pulmonary effort is normal. No respiratory distress.      Breath sounds: Normal breath sounds.   Abdominal:      General: Abdomen is flat.      Palpations: Abdomen is soft.      Tenderness: There is no abdominal tenderness.   Musculoskeletal:         General: No tenderness or deformity. Normal range of motion.      Cervical back: Normal range of motion and neck supple. No rigidity or tenderness.   Skin:     General: Skin is warm.      Capillary Refill: Capillary refill takes less than 2 seconds.      Coloration: Skin " is not pale.      Findings: No erythema or rash.   Neurological:      General: No focal deficit present.      Mental Status: He is alert and oriented to person, place, and time.      Cranial Nerves: No cranial nerve deficit.      Sensory: No sensory deficit.      Motor: No weakness.      Coordination: Coordination normal.      Gait: Gait normal.      Comments: Cranial nerves intact.  Moving all extremity spontaneously with equal strength throughout normal sensation to light touch.  Normal finger-nose-finger and gait.   Psychiatric:      Comments: Anxious.         ED Course        Procedures            Results for orders placed or performed during the hospital encounter of 08/07/24 (from the past 24 hour(s))   CBC with Platelets & Differential    Narrative    The following orders were created for panel order CBC with Platelets & Differential.  Procedure                               Abnormality         Status                     ---------                               -----------         ------                     CBC with platelets and d...[[id number]]  Abnormal            Final result                 Please view results for these tests on the individual orders.   Basic metabolic panel   Result Value Ref Range    Sodium 139 135 - 145 mmol/L    Potassium 3.8 3.4 - 5.3 mmol/L    Chloride 103 98 - 107 mmol/L    Carbon Dioxide (CO2) 25 22 - 29 mmol/L    Anion Gap 11 7 - 15 mmol/L    Urea Nitrogen 11.1 6.0 - 20.0 mg/dL    Creatinine 0.81 0.67 - 1.17 mg/dL    GFR Estimate >90 >60 mL/min/1.73m2    Calcium 8.9 8.8 - 10.4 mg/dL    Glucose 93 70 - 99 mg/dL   INR   Result Value Ref Range    INR 0.93 0.85 - 1.15   Partial thromboplastin time   Result Value Ref Range    aPTT 29 22 - 38 Seconds   Troponin T, High Sensitivity   Result Value Ref Range    Troponin T, High Sensitivity 9 <=22 ng/L   CBC with platelets and differential   Result Value Ref Range    WBC Count 5.7 4.0 - 11.0 10e3/uL    RBC Count 3.98 (L) 4.40 - 5.90 10e6/uL     Hemoglobin 14.2 13.3 - 17.7 g/dL    Hematocrit 40.2 40.0 - 53.0 %     (H) 78 - 100 fL    MCH 35.7 (H) 26.5 - 33.0 pg    MCHC 35.3 31.5 - 36.5 g/dL    RDW 12.2 10.0 - 15.0 %    Platelet Count 164 150 - 450 10e3/uL    % Neutrophils 45 %    % Lymphocytes 42 %    % Monocytes 9 %    % Eosinophils 3 %    % Basophils 1 %    % Immature Granulocytes 0 %    NRBCs per 100 WBC 0 <1 /100    Absolute Neutrophils 2.6 1.6 - 8.3 10e3/uL    Absolute Lymphocytes 2.4 0.8 - 5.3 10e3/uL    Absolute Monocytes 0.5 0.0 - 1.3 10e3/uL    Absolute Eosinophils 0.2 0.0 - 0.7 10e3/uL    Absolute Basophils 0.0 0.0 - 0.2 10e3/uL    Absolute Immature Granulocytes 0.0 <=0.4 10e3/uL    Absolute NRBCs 0.0 10e3/uL   CTA Head Neck with Contrast    Narrative    EXAM: CTA HEAD NECK W CONTRAST  LOCATION: [facility]  DATE: 8/7/2024    INDICATION: Acute neuro deficit, stroke TIA suspected  COMPARISON: None.  CONTRAST: Isovue 370, 67 mL  TECHNIQUE: Head and neck CT angiogram with IV contrast. Noncontrast head CT followed by axial helical CT images of the head and neck vessels obtained during the arterial phase of intravenous contrast administration. Axial 2D reconstructed images and   multiplanar 3D MIP reconstructed images of the head and neck vessels were performed by the technologist. Dose reduction techniques were used. All stenosis measurements made according to NASCET criteria unless otherwise specified.    FINDINGS:     HEAD CTA:  ANTERIOR CIRCULATION: No stenosis/occlusion, aneurysm, or high flow vascular malformation. Standard [place] anatomy. Hypoplastic right A1 segment.    POSTERIOR CIRCULATION: No stenosis/occlusion, aneurysm, or high flow vascular malformation. Balanced vertebral arteries supply a normal basilar artery.     DURAL VENOUS SINUSES: Expected enhancement of the major dural venous sinuses.    NECK CTA:  RIGHT CAROTID: No measurable stenosis or dissection.    LEFT CAROTID: No measurable  stenosis or dissection.    VERTEBRAL ARTERIES: No focal stenosis or dissection. Balanced vertebral arteries.    AORTIC ARCH: Classic aortic arch anatomy with no significant stenosis at the origin of the great vessels.    NONVASCULAR STRUCTURES: Unremarkable.      Impression    IMPRESSION:     HEAD CTA:   No large vessel occlusion findings intracranially.    NECK CTA:  1.  No hemodynamically significant stenosis in the neck.   CT Head w/o Contrast    Narrative    EXAM: CT HEAD W/O CONTRAST  LOCATION: [facility]  DATE: 8/7/2024    INDICATION: Acute neuro deficit, stroke TIA suspected  COMPARISON: None.  TECHNIQUE: Routine CT Head without IV contrast. Multiplanar reformats. Dose reduction techniques were used.    FINDINGS:  INTRACRANIAL CONTENTS: No intracranial hemorrhage, extraaxial collection, or mass effect.  No CT evidence of acute infarct. Normal parenchymal attenuation. Normal ventricles and sulci.     VISUALIZED ORBITS/SINUSES/MASTOIDS: No intraorbital abnormality. No paranasal sinus mucosal disease. No middle ear or mastoid effusion.    BONES/SOFT TISSUES: No acute abnormality.      Impression    IMPRESSION:  1.  No acute intracranial findings.   MR Brain w/o & w Contrast    Narrative    EXAM: MR BRAIN W/O and W CONTRAST  LOCATION: [facility]  DATE: 8/7/2024    INDICATION: Bilateral blurred vision, numbness tingling of left extremities  COMPARISON: CT head 8/7/2024  CONTRAST: 8.5 mL Gadavist  TECHNIQUE: Routine multiplanar multisequence head MRI without and with intravenous contrast.    FINDINGS:  INTRACRANIAL CONTENTS: No acute or subacute infarct. No mass, acute hemorrhage, or extra-axial fluid collections. Normal brain parenchymal signal. Normal ventricles and sulci. Normal position of the cerebellar tonsils. No pathologic contrast enhancement.    SELLA: No abnormality accounting for technique.    OSSEOUS STRUCTURES/SOFT TISSUES: Normal marrow  signal. The major intracranial vascular flow voids are maintained.     ORBITS: No abnormality accounting for technique.     SINUSES/MASTOIDS: Mucosal thickening primarily involving the ethmoid air cells. No middle ear or mastoid effusion.       Impression    IMPRESSION:  1.  Normal head MRI.       Medications   iopamidol (ISOVUE-370) solution 500 mL (67 mLs Intravenous $Given 8/7/24 1740)   sodium chloride 0.9 % bag 100mL for CT scan flush use (100 mLs Intravenous $Given 8/7/24 1740)   gadobutrol (GADAVIST) injection 8.5 mL (8.5 mLs Intravenous $Given 8/7/24 1822)       Assessments & Plan (with Medical Decision Making)     I have reviewed the nursing notes.    I have reviewed the findings, diagnosis, plan and need for follow up with the patient.  Medical Decision Making  Karson Guzman is a 49 year old male with history of anxiety, hemochromatosis who presents for evaluation of dizziness, numbness, vision changes.  Hypertensive on arrival, 178/118.  Vitals otherwise normal.  A stroke assessment was called initially based on description of vision changes, numbness.  However, all of these symptoms have been present for at least 3 days, if not longer, so I do not think he requires a full stroke alert.  We will still proceed with CT/CTA, lab work, and ultimately MRI to evaluate symptoms.  Differential would include CVA, other neurological causes such as MS, anxiety, among others.  He just had a fairly extensive cardiac workup and is pending outpatient Zio patch evaluation.    Workup was very reassuring.  He has no leukocytosis or anemia, electrolytes were normal.  Troponin was negative.  CT/CTA and MRI were all negative for acute abnormalities.  Patient denies chest pain, pulses are equal in all extremities, and I highly doubt aortic pathology.  Cause of symptoms remains unclear, but I do think he is safe for discharge home with outpatient follow-up.  Neurology referral provided.  Patient will see his PCP as soon as  possible and agrees to return to the emergency department immediately for new or worsening symptoms.    Discharge Medication List as of 8/7/2024  7:20 PM        Final diagnoses:   Dizziness   Vision changes   Numbness and tingling     8/7/2024   [facility] EMERGENCY DEPT       Oziel Keller MD  08/07/24 1846

## 2024-08-08 NOTE — DISCHARGE INSTRUCTIONS
Your testing today including the MRI/CT scans is all very reassuring.  I do not see any signs of electrolyte abnormalities, heart stress, or problems within the brain such as stroke.    I agree with plans made a few days ago to have a Holter monitor/Zio patch placed.  I have also placed a referral for you to be seen by neurology for further evaluation, make an appointment to soon as possible.    In the meantime, rest and drink plenty of fluids.  Follow-up with your primary care team soon as possible.  Return to the emergency department sooner for any new or worsening symptoms.

## 2024-08-09 DIAGNOSIS — J45.40 MODERATE PERSISTENT ASTHMA WITHOUT COMPLICATION: ICD-10-CM

## 2024-08-09 RX ORDER — ALBUTEROL SULFATE 90 UG/1
AEROSOL, METERED RESPIRATORY (INHALATION)
Qty: 6.7 G | Refills: 2 | Status: SHIPPED | OUTPATIENT
Start: 2024-08-09

## 2024-08-10 LAB — GLUCOSE BLDC GLUCOMTR-MCNC: 101 MG/DL (ref 70–99)

## 2024-08-14 ENCOUNTER — ANCILLARY PROCEDURE (OUTPATIENT)
Dept: CT IMAGING | Facility: CLINIC | Age: 49
End: 2024-08-14
Payer: COMMERCIAL

## 2024-08-14 DIAGNOSIS — Z12.2 ENCOUNTER FOR SCREENING FOR LUNG CANCER: ICD-10-CM

## 2024-08-14 DIAGNOSIS — Z87.891 PERSONAL HISTORY OF TOBACCO USE: ICD-10-CM

## 2024-08-14 PROCEDURE — 71271 CT THORAX LUNG CANCER SCR C-: CPT | Performed by: RADIOLOGY

## 2024-09-05 ENCOUNTER — NURSE TRIAGE (OUTPATIENT)
Dept: FAMILY MEDICINE | Facility: OTHER | Age: 49
End: 2024-09-05
Payer: COMMERCIAL

## 2024-09-05 NOTE — TELEPHONE ENCOUNTER
"Patient transferred via [facility]  URI symptoms began Friday - coughing, fatigue  Symptoms improved but feel they have worsened today   Denies difficulty breathing right now while at rest but just administered albuterol  Hx asthma  Speaking in long full sentences on the phone   Patient states \"I feel relatively good, I'm sleeping fine, I'm at work, it's uncomfortable\"  Covid negative with home test last week, encouraged he could test again as this may have been too soon  In the process of quitting smoking - was using breath laser which may temporarily take away taste and smell   Reports taste and smell were gone over the weekend but has now returned  Advised assessment today, now if possible  Patient currently at work and has appointment with PCP tomorrow  Offered appointment at [facility] today  Patient prefers to go to appointment tomorrow due to work schedule today  Patient will call back or go to urgent care if new or worsening symptoms prior to appointment  Luann Holland RN     Reason for Disposition   MILD difficulty breathing (e.g., minimal/no SOB at rest, SOB with walking, pulse < 100) of new-onset or worse than normal    Additional Information   Negative: SEVERE difficulty breathing (e.g., struggling for each breath, speaks in single words, pulse > 120)   Negative: Breathing stopped and hasn't returned   Negative: Choking on something   Negative: Bluish (or gray) lips or face   Negative: Difficult to awaken or acting confused (e.g., disoriented, slurred speech)   Negative: Passed out (i.e., fainted, collapsed and was not responding)   Negative: Wheezing started suddenly after medicine, an allergic food, or bee sting   Negative: Stridor (harsh sound while breathing in)   Negative: Slow, shallow and weak breathing   Negative: Sounds like a life-threatening emergency to the triager   Negative: Chest pain   Negative: Wheezing (high pitched whistling sound) and previous asthma attacks or use of asthma " "medicines   Negative: Difficulty breathing and within 14 days of COVID-19 Exposure   Negative: Difficulty breathing and only present when coughing   Negative: Difficulty breathing and only from stuffy nose   Negative: Difficulty breathing and only from stuffy nose or runny nose from common cold   Negative: MODERATE difficulty breathing (e.g., speaks in phrases, SOB even at rest, pulse 100-120) of new-onset or worse than normal   Negative: Oxygen level (e.g., pulse oximetry) 90% or lower   Negative: Wheezing can be heard across the room   Negative: Drooling or spitting out saliva (because can't swallow)   Negative: Any history of prior \"blood clot\" in leg or lungs   Negative: Illness requiring prolonged bedrest in past month (e.g., immobilization, long hospital stay)   Negative: Hip or leg fracture (broken bone) in past month (or had cast on leg or ankle in past month)   Negative: Major surgery in the past month   Negative: Long-distance travel in past month (e.g., car, bus, train, plane; with trip lasting 6 or more hours)   Negative: Cancer treatment in past six months (or has cancer now)   Negative: Extra heartbeats, irregular heart beating, or heart is beating very fast (i.e., 'palpitations')   Negative: Fever > 103 F (39.4 C)   Negative: Fever > 101 F (38.3 C) and over 60 years of age   Negative: Fever > 100.0 F (37.8 C) and bedridden (e.g., nursing home patient, stroke, chronic illness, recovering from surgery)   Negative: Fever > 100.0 F (37.8 C) and diabetes mellitus or weak immune system (e.g., HIV positive, cancer chemo, splenectomy, organ transplant, chronic steroids)   Negative: Periods where breathing stops and then resumes normally and bedridden (e.g., nursing home patient, CVA)   Negative: Pregnant or postpartum (from 0 to 6 weeks after delivery)   Negative: Patient sounds very sick or weak to the triager    Protocols used: Breathing Difficulty-A-[place]    "

## 2024-09-06 ENCOUNTER — ANCILLARY PROCEDURE (OUTPATIENT)
Dept: GENERAL RADIOLOGY | Facility: OTHER | Age: 49
End: 2024-09-06
Attending: PHYSICIAN ASSISTANT
Payer: COMMERCIAL

## 2024-09-06 ENCOUNTER — OFFICE VISIT (OUTPATIENT)
Dept: FAMILY MEDICINE | Facility: OTHER | Age: 49
End: 2024-09-06
Payer: COMMERCIAL

## 2024-09-06 VITALS
DIASTOLIC BLOOD PRESSURE: 88 MMHG | WEIGHT: 183 LBS | HEIGHT: 69 IN | RESPIRATION RATE: 20 BRPM | SYSTOLIC BLOOD PRESSURE: 116 MMHG | TEMPERATURE: 97.6 F | BODY MASS INDEX: 27.11 KG/M2 | HEART RATE: 92 BPM | OXYGEN SATURATION: 91 %

## 2024-09-06 DIAGNOSIS — R79.89 HIGH SERUM FERRITIN: ICD-10-CM

## 2024-09-06 DIAGNOSIS — J45.41 MODERATE PERSISTENT ASTHMA WITH ACUTE EXACERBATION: Primary | ICD-10-CM

## 2024-09-06 DIAGNOSIS — J45.41 MODERATE PERSISTENT ASTHMA WITH ACUTE EXACERBATION: ICD-10-CM

## 2024-09-06 DIAGNOSIS — R53.81 MALAISE AND FATIGUE: ICD-10-CM

## 2024-09-06 DIAGNOSIS — G47.33 OSA (OBSTRUCTIVE SLEEP APNEA): ICD-10-CM

## 2024-09-06 DIAGNOSIS — R53.83 MALAISE AND FATIGUE: ICD-10-CM

## 2024-09-06 DIAGNOSIS — J45.40 MODERATE PERSISTENT ASTHMA WITHOUT COMPLICATION: ICD-10-CM

## 2024-09-06 LAB
BASOPHILS # BLD AUTO: 0 10E3/UL (ref 0–0.2)
BASOPHILS NFR BLD AUTO: 1 %
EOSINOPHIL # BLD AUTO: 0.2 10E3/UL (ref 0–0.7)
EOSINOPHIL NFR BLD AUTO: 4 %
ERYTHROCYTE [DISTWIDTH] IN BLOOD BY AUTOMATED COUNT: 11.3 % (ref 10–15)
HCT VFR BLD AUTO: 45.5 % (ref 40–53)
HGB BLD-MCNC: 16.2 G/DL (ref 13.3–17.7)
IMM GRANULOCYTES # BLD: 0 10E3/UL
IMM GRANULOCYTES NFR BLD: 0 %
LYMPHOCYTES # BLD AUTO: 2.5 10E3/UL (ref 0.8–5.3)
LYMPHOCYTES NFR BLD AUTO: 44 %
MCH RBC QN AUTO: 35.3 PG (ref 26.5–33)
MCHC RBC AUTO-ENTMCNC: 35.6 G/DL (ref 31.5–36.5)
MCV RBC AUTO: 99 FL (ref 78–100)
MONOCYTES # BLD AUTO: 0.5 10E3/UL (ref 0–1.3)
MONOCYTES NFR BLD AUTO: 8 %
NEUTROPHILS # BLD AUTO: 2.6 10E3/UL (ref 1.6–8.3)
NEUTROPHILS NFR BLD AUTO: 44 %
PLATELET # BLD AUTO: 206 10E3/UL (ref 150–450)
RBC # BLD AUTO: 4.59 10E6/UL (ref 4.4–5.9)
WBC # BLD AUTO: 5.8 10E3/UL (ref 4–11)

## 2024-09-06 PROCEDURE — 99214 OFFICE O/P EST MOD 30 MIN: CPT | Performed by: PHYSICIAN ASSISTANT

## 2024-09-06 PROCEDURE — 71046 X-RAY EXAM CHEST 2 VIEWS: CPT | Mod: TC | Performed by: RADIOLOGY

## 2024-09-06 PROCEDURE — 36415 COLL VENOUS BLD VENIPUNCTURE: CPT | Performed by: PHYSICIAN ASSISTANT

## 2024-09-06 PROCEDURE — 85025 COMPLETE CBC W/AUTO DIFF WBC: CPT | Performed by: PHYSICIAN ASSISTANT

## 2024-09-06 RX ORDER — FLUTICASONE PROPIONATE AND SALMETEROL XINAFOATE 115; 21 UG/1; UG/1
2 AEROSOL, METERED RESPIRATORY (INHALATION) 2 TIMES DAILY
Qty: 8 G | Refills: 5 | Status: SHIPPED | OUTPATIENT
Start: 2024-09-06

## 2024-09-06 RX ORDER — MONTELUKAST SODIUM 10 MG/1
10 TABLET ORAL AT BEDTIME
Qty: 90 TABLET | Refills: 1 | Status: SHIPPED | OUTPATIENT
Start: 2024-09-06

## 2024-09-06 RX ORDER — METHYLPREDNISOLONE 4 MG
TABLET, DOSE PACK ORAL
Qty: 21 TABLET | Refills: 0 | Status: SHIPPED | OUTPATIENT
Start: 2024-09-06

## 2024-09-06 ASSESSMENT — ASTHMA QUESTIONNAIRES
QUESTION_3 LAST FOUR WEEKS HOW OFTEN DID YOUR ASTHMA SYMPTOMS (WHEEZING, COUGHING, SHORTNESS OF BREATH, CHEST TIGHTNESS OR PAIN) WAKE YOU UP AT NIGHT OR EARLIER THAN USUAL IN THE MORNING: ONCE OR TWICE
QUESTION_5 LAST FOUR WEEKS HOW WOULD YOU RATE YOUR ASTHMA CONTROL: NOT CONTROLLED AT ALL
ACT_TOTALSCORE: 10
ACT_TOTALSCORE: 10
QUESTION_4 LAST FOUR WEEKS HOW OFTEN HAVE YOU USED YOUR RESCUE INHALER OR NEBULIZER MEDICATION (SUCH AS ALBUTEROL): THREE OR MORE TIMES PER DAY
QUESTION_2 LAST FOUR WEEKS HOW OFTEN HAVE YOU HAD SHORTNESS OF BREATH: MORE THAN ONCE A DAY
QUESTION_1 LAST FOUR WEEKS HOW MUCH OF THE TIME DID YOUR ASTHMA KEEP YOU FROM GETTING AS MUCH DONE AT WORK, SCHOOL OR AT HOME: SOME OF THE TIME

## 2024-09-06 ASSESSMENT — PAIN SCALES - GENERAL: PAINLEVEL: NO PAIN (0)

## 2024-09-06 NOTE — PROGRESS NOTES
"  Assessment & Plan     Moderate persistent asthma with acute exacerbation  Malaise and fatigue  Asthma exacerbation at this point in time without evidence of infectious process.  Trial of medications as noted below and get started back on his controlled medications for long-term.  Follow-up with primary care provider as needed.  - CBC with platelets and differential; Future  - XR Chest 2 Views; Future  - CBC with platelets and differential  - methylPREDNISolone (MEDROL DOSEPAK) 4 MG tablet therapy pack; Follow Package Directions    ALBERTO (obstructive sleep apnea)  High serum ferritin  Has not been treating his sleep apnea at all.  States that his snoring is less.  Strongly recommended that he follow-up with his primary care provider.  Follow-up with specialist for high serum ferritin history.    Moderate persistent asthma without complication  Recommended that he get back on this medication.  Follow-up with primary care provider in the near future as recommended as well.  - ADVAIR -21 MCG/ACT inhaler; Inhale 2 puffs into the lungs 2 times daily.  - montelukast (SINGULAIR) 10 MG tablet; Take 1 tablet (10 mg) by mouth at bedtime.    Nicotine/Tobacco Cessation  He reports that he has been smoking cigarettes. He has never been exposed to tobacco smoke. He has never used smokeless tobacco.  Nicotine/Tobacco Cessation Plan  Information offered: Patient not interested at this time      BMI  Estimated body mass index is 27.04 kg/m  as calculated from the following:    Height as of this encounter: 1.752 m (5' 8.98\").    Weight as of this encounter: 83 kg (183 lb).   Weight management plan: Patient was referred to their PCP to discuss a diet and exercise plan.      Alma Covarrubias is a 49 year old, presenting for the following health issues:  URI      9/6/2024     7:51 AM   Additional Questions   Roomed by Shania   Accompanied by Self         9/6/2024     7:51 AM   Patient Reported Additional Medications   Patient " "reports taking the following new medications NA     History of Present Illness       Reason for visit:  [place]  Symptom onset:  3-7 days ago  Symptom intensity:  Moderate  Symptom progression:  Improving  Had these symptoms before:  Yes  Has tried/received treatment for these symptoms:  No  What makes it worse:  Exercise  What makes it better:  Sitting   He is taking medications regularly.                 Review of Systems  Constitutional, HEENT, cardiovascular, pulmonary, GI, , musculoskeletal, neuro, skin, endocrine and psych systems are negative, except as otherwise noted.      Objective    /88   Pulse 92   Temp 97.6  F (36.4  C) (Temporal)   Resp 20   Ht 1.752 m (5' 8.98\")   Wt 83 kg (183 lb)   SpO2 91%   BMI 27.04 kg/m    Body mass index is 27.04 kg/m .  Physical Exam   GENERAL: alert and no distress  HENT: ear canals and TM's normal, nose and mouth without ulcers or lesions  NECK: no adenopathy, no asymmetry, masses, or scars  RESP: expiratory wheezes throughout and inspiratory wheezes throughout  CV: regular rate and rhythm, normal S1 S2, no S3 or S4, no murmur, click or rub, no peripheral edema  MS: no gross musculoskeletal defects noted, no edema  SKIN: no suspicious lesions or rashes to exposed visible skin today.  NEURO: Normal strength and tone, mentation intact and speech normal  PSYCH: mentation appears normal, affect normal/bright    X-ray: negative for concerning pathology to my independent review today.  Some mild congestion noted but no seen to suggest a pneumonia process.  It will be overread by radiology.    Results for orders placed or performed in visit on 09/06/24 (from the past 24 hour(s))   CBC with platelets and differential    Narrative    The following orders were created for panel order CBC with platelets and differential.  Procedure                               Abnormality         Status                     ---------                               -----------         " ------                     CBC with platelets and d...[[id number]]                      In process                   Please view results for these tests on the individual orders.           Signed Electronically by: Narinder Martinez PA-C

## 2024-10-02 ENCOUNTER — LAB (OUTPATIENT)
Dept: INFUSION THERAPY | Facility: CLINIC | Age: 49
End: 2024-10-02
Attending: INTERNAL MEDICINE
Payer: COMMERCIAL

## 2024-10-02 ENCOUNTER — ONCOLOGY VISIT (OUTPATIENT)
Dept: ONCOLOGY | Facility: CLINIC | Age: 49
End: 2024-10-02
Attending: INTERNAL MEDICINE
Payer: COMMERCIAL

## 2024-10-02 VITALS
WEIGHT: 191 LBS | OXYGEN SATURATION: 100 % | HEART RATE: 79 BPM | RESPIRATION RATE: 16 BRPM | BODY MASS INDEX: 28.29 KG/M2 | DIASTOLIC BLOOD PRESSURE: 86 MMHG | SYSTOLIC BLOOD PRESSURE: 129 MMHG | TEMPERATURE: 98.5 F | HEIGHT: 69 IN

## 2024-10-02 VITALS — DIASTOLIC BLOOD PRESSURE: 85 MMHG | HEART RATE: 89 BPM | SYSTOLIC BLOOD PRESSURE: 136 MMHG | OXYGEN SATURATION: 97 %

## 2024-10-02 DIAGNOSIS — E83.19 IRON OVERLOAD: ICD-10-CM

## 2024-10-02 DIAGNOSIS — R79.89 HIGH SERUM FERRITIN: Primary | ICD-10-CM

## 2024-10-02 LAB
FERRITIN SERPL-MCNC: 271 NG/ML (ref 31–409)
HOLD SPECIMEN: NORMAL

## 2024-10-02 PROCEDURE — 99207 PR NO CHARGE LOS: CPT

## 2024-10-02 PROCEDURE — 82728 ASSAY OF FERRITIN: CPT

## 2024-10-02 PROCEDURE — 36415 COLL VENOUS BLD VENIPUNCTURE: CPT

## 2024-10-02 PROCEDURE — G2211 COMPLEX E/M VISIT ADD ON: HCPCS | Performed by: INTERNAL MEDICINE

## 2024-10-02 PROCEDURE — 99213 OFFICE O/P EST LOW 20 MIN: CPT | Performed by: INTERNAL MEDICINE

## 2024-10-02 PROCEDURE — 99195 PHLEBOTOMY: CPT

## 2024-10-02 RX ORDER — HEPARIN SODIUM (PORCINE) LOCK FLUSH IV SOLN 100 UNIT/ML 100 UNIT/ML
5 SOLUTION INTRAVENOUS
OUTPATIENT
Start: 2024-11-01

## 2024-10-02 RX ORDER — HEPARIN SODIUM,PORCINE 10 UNIT/ML
5-20 VIAL (ML) INTRAVENOUS DAILY PRN
OUTPATIENT
Start: 2024-11-01

## 2024-10-02 ASSESSMENT — PAIN SCALES - GENERAL: PAINLEVEL: NO PAIN (0)

## 2024-10-02 NOTE — LETTER
10/2/2024      Karson Guzman  [address] [facility] [address]      Dear Colleague,    Thank you for referring your patient, Karson Guzman, to the [facility]. Please see a copy of my visit note below.    [facility] Hematology / Oncology  Progress Note  Name: Karson Guzman  :  1975  MRN:  [id number]    --------------------    Assessment / Plan:  Elevated ferritin, likely multifactorial (trace iron overload, alcohol use, weight)    Continue phlebotomy sessions until goal ferritin 40-80.  Once at goal, planning to schedule phlebotomy session every 3 to 4 months again goal ferritin of 40-80.    Follow-up:  1) Phleb session in 1, 2, 3, 4, 6, 9 and 12 months.  2) JEANETTE MG w/ 12 month phleb session.    Dmitriy Read MD    --------------------    Interval History:  Karson presents for follow-up of elevated ferritin unaccompanied.  All in all, he remains really well.  No health concerns at this time.  Phlebotomy sessions have been going off without a hitch.  Does have some alcohol when engaging socially with family and friends during gatherings.    --------------------    Family History:  Family History   Problem Relation Age of Onset     Hypertension Mother      Cancer Father      Diabetes No family hx of      Hyperlipidemia No family hx of      Other Cancer No family hx of      Coronary Artery Disease No family hx of        Social History:  Social History     Tobacco Use     Smoking status: Every Day     Current packs/day: 0.00     Types: Cigarettes     Last attempt to quit: 2003     Years since quittin.4     Passive exposure: Never     Smokeless tobacco: Never     Tobacco comments:     not everyday- can go months without, then may have a few   Vaping Use     Vaping status: Never Used   Substance Use Topics     Alcohol use: Yes     Comment: occ. use- per week use- varies 0-4     Drug use: No       Medications / Allergies:  Reviewed in  "EMR.    --------------------    Physical Exam:  VS: /86 (BP Location: Right arm, Patient Position: Chair, Cuff Size: Adult Regular)   Pulse 79   Temp 98.5  F (36.9  C) (Oral)   Resp 16   Ht 1.752 m (5' 8.98\")   Wt 86.6 kg (191 lb)   SpO2 100%   BMI 28.22 kg/m    GEN: Well appearing.    Labs / Imaging / Path:  Reviewed CBC, ferritin.      Again, thank you for allowing me to participate in the care of your patient.        Sincerely,        Dmitriy Read MD  "

## 2024-10-02 NOTE — PROGRESS NOTES
"Prior to scheduled phlebotomy verified patient identity using patient's name and date of birth.  Lab(s) verified: hemoglobin 15.5, ferritin 271    Peripheral Access:  Accessed: Right AC with 20ga autoguard instyte catheter without difficulty.  Positive blood return.  No redness, edema or complaints of discomfort.  Pt tolerated phlebotomy, 500 mls of blood removed via gravity using whole blood collection bag and scale per MD's order.  400 mls of oral fluid replaced.  Pt tolerated procedure without adverse reactions.  Pt denies headache, nausea or discomfort.  IV flushed with 10mls 0.9% Normal Saline after completion of procedure and discontinued per policy with pressure dressing applied.      Pre Vital Signs including O2 saturation documented in \"Vitals\"  Post Vital Signs see flowsheet    Reviewed and provided discharge instructions regarding therapeutic phlebotomy.  Pt verbalized understanding.    Reviewed appointments and copy of schedule given to pt.  Return to clinic in: Advised patient to stop at scheduling to make future appointments.      Ambulation steady.  Pt alert and orientated upon discharge.          "

## 2024-10-02 NOTE — NURSING NOTE
"Oncology Rooming Note    October 2, 2024 2:57 PM   Karson Guzman is a 49 year old male who presents for:    Chief Complaint   Patient presents with    Oncology Clinic Visit     Follow up     Initial Vitals: /86 (BP Location: Right arm, Patient Position: Chair, Cuff Size: Adult Regular)   Pulse 79   Temp 98.5  F (36.9  C) (Oral)   Resp 16   Ht 1.752 m (5' 8.98\")   Wt 86.6 kg (191 lb)   SpO2 100%   BMI 28.22 kg/m   Estimated body mass index is 28.22 kg/m  as calculated from the following:    Height as of this encounter: 1.752 m (5' 8.98\").    Weight as of this encounter: 86.6 kg (191 lb). Body surface area is 2.05 meters squared.  No Pain (0) Comment: Data Unavailable   No LMP for male patient.  Allergies reviewed: Yes  Medications reviewed: Yes    Medications: Medication refills not needed today.  Ph[facility] into [id number] [id number] AT [facility] [address]    Frailty Screening:   Is the patient here for a new oncology consult visit in cancer care? 2. No      Clinical concerns: NO       Zehra Ramos CMA              "

## 2024-10-03 NOTE — PATIENT INSTRUCTIONS
1) Phleb session in 1, 2, 3, 4, 6, 9 and 12 months.  2) JEANETTE MG w/ 12 month phleb session.

## 2024-10-03 NOTE — PROGRESS NOTES
"[facility] Hematology / Oncology  Progress Note  Name: Karson Guzman  :  1975  MRN:  [id number]    --------------------    Assessment / Plan:  Elevated ferritin, likely multifactorial (trace iron overload, alcohol use, weight)    Continue phlebotomy sessions until goal ferritin 40-80.  Once at goal, planning to schedule phlebotomy session every 3 to 4 months again goal ferritin of 40-80.    Follow-up:  1) Phleb session in 1, 2, 3, 4, 6, 9 and 12 months.  2) JEANETTE MG w/ 12 month phleb session.    Dmitriy Read MD    --------------------    Interval History:  Karson presents for follow-up of elevated ferritin unaccompanied.  All in all, he remains really well.  No health concerns at this time.  Phlebotomy sessions have been going off without a hitch.  Does have some alcohol when engaging socially with family and friends during gatherings.    --------------------    Family History:  Family History   Problem Relation Age of Onset    Hypertension Mother     Cancer Father     Diabetes No family hx of     Hyperlipidemia No family hx of     Other Cancer No family hx of     Coronary Artery Disease No family hx of        Social History:  Social History     Tobacco Use    Smoking status: Every Day     Current packs/day: 0.00     Types: Cigarettes     Last attempt to quit: 2003     Years since quittin.4     Passive exposure: Never    Smokeless tobacco: Never    Tobacco comments:     not everyday- can go months without, then may have a few   Vaping Use    Vaping status: Never Used   Substance Use Topics    Alcohol use: Yes     Comment: occ. use- per week use- varies 0-4    Drug use: No       Medications / Allergies:  Reviewed in EMR.    --------------------    Physical Exam:  VS: /86 (BP Location: Right arm, Patient Position: Chair, Cuff Size: Adult Regular)   Pulse 79   Temp 98.5  F (36.9  C) (Oral)   Resp 16   Ht 1.752 m (5' 8.98\")   Wt 86.6 kg (191 lb)   SpO2 100%   BMI 28.22 " kg/m    GEN: Well appearing.    Labs / Imaging / Path:  Reviewed CBC, ferritin.

## 2024-10-07 DIAGNOSIS — J45.40 MODERATE PERSISTENT ASTHMA WITHOUT COMPLICATION: ICD-10-CM

## 2024-10-07 NOTE — TELEPHONE ENCOUNTER
Patient called and asked if this can be filled for him as he is out. Please advise refill and route back so we can call him once sent.

## 2024-10-08 RX ORDER — ALBUTEROL SULFATE 90 UG/1
INHALANT RESPIRATORY (INHALATION)
Qty: 6.7 G | Refills: 2 | Status: SHIPPED | OUTPATIENT
Start: 2024-10-08

## 2024-12-11 DIAGNOSIS — J45.40 MODERATE PERSISTENT ASTHMA WITHOUT COMPLICATION: ICD-10-CM

## 2024-12-12 RX ORDER — ALBUTEROL SULFATE 90 UG/1
INHALANT RESPIRATORY (INHALATION)
Qty: 6.7 G | Refills: 2 | OUTPATIENT
Start: 2024-12-12

## 2024-12-12 RX ORDER — ALBUTEROL SULFATE 90 UG/1
INHALANT RESPIRATORY (INHALATION)
Qty: 6.7 G | Refills: 0 | Status: SHIPPED | OUTPATIENT
Start: 2024-12-12

## 2024-12-12 NOTE — TELEPHONE ENCOUNTER
Needs to be seen in follow-up as discussed at last visit.  Please help him arrange a follow-up and a josesito refill can be given.  No further refills without office visit. Electronically signed: Narinder Bosch PA-C

## 2024-12-13 DIAGNOSIS — J45.40 MODERATE PERSISTENT ASTHMA WITHOUT COMPLICATION: ICD-10-CM

## 2024-12-15 RX ORDER — FLUTICASONE PROPIONATE AND SALMETEROL XINAFOATE 115; 21 UG/1; UG/1
2 AEROSOL, METERED RESPIRATORY (INHALATION) 2 TIMES DAILY
Qty: 12 G | Refills: 0 | Status: SHIPPED | OUTPATIENT
Start: 2024-12-15

## 2025-01-07 ENCOUNTER — PATIENT OUTREACH (OUTPATIENT)
Dept: CARE COORDINATION | Facility: CLINIC | Age: 50
End: 2025-01-07
Payer: COMMERCIAL

## 2025-01-15 DIAGNOSIS — R79.89 HIGH SERUM FERRITIN: Primary | ICD-10-CM

## 2025-01-15 DIAGNOSIS — E83.19 IRON OVERLOAD: ICD-10-CM

## 2025-01-16 ENCOUNTER — OFFICE VISIT (OUTPATIENT)
Dept: FAMILY MEDICINE | Facility: OTHER | Age: 50
End: 2025-01-16
Payer: COMMERCIAL

## 2025-01-16 VITALS
HEART RATE: 92 BPM | DIASTOLIC BLOOD PRESSURE: 76 MMHG | OXYGEN SATURATION: 97 % | TEMPERATURE: 97.8 F | BODY MASS INDEX: 30.73 KG/M2 | SYSTOLIC BLOOD PRESSURE: 116 MMHG | WEIGHT: 208 LBS | RESPIRATION RATE: 18 BRPM

## 2025-01-16 DIAGNOSIS — G47.33 OSA (OBSTRUCTIVE SLEEP APNEA): ICD-10-CM

## 2025-01-16 DIAGNOSIS — J45.40 MODERATE PERSISTENT ASTHMA WITHOUT COMPLICATION: Primary | ICD-10-CM

## 2025-01-16 DIAGNOSIS — L57.0 SOLAR KERATOSIS: ICD-10-CM

## 2025-01-16 RX ORDER — ALBUTEROL SULFATE 90 UG/1
INHALANT RESPIRATORY (INHALATION)
Qty: 18 G | Refills: 3 | Status: SHIPPED | OUTPATIENT
Start: 2025-01-16

## 2025-01-16 RX ORDER — MONTELUKAST SODIUM 10 MG/1
10 TABLET ORAL AT BEDTIME
Qty: 90 TABLET | Refills: 3 | Status: SHIPPED | OUTPATIENT
Start: 2025-01-16

## 2025-01-16 RX ORDER — FLUTICASONE PROPIONATE AND SALMETEROL XINAFOATE 115; 21 UG/1; UG/1
2 AEROSOL, METERED RESPIRATORY (INHALATION) 2 TIMES DAILY
Qty: 12 G | Refills: 3 | Status: SHIPPED | OUTPATIENT
Start: 2025-01-16

## 2025-01-16 ASSESSMENT — ASTHMA QUESTIONNAIRES
ACT_TOTALSCORE: 23
QUESTION_1 LAST FOUR WEEKS HOW MUCH OF THE TIME DID YOUR ASTHMA KEEP YOU FROM GETTING AS MUCH DONE AT WORK, SCHOOL OR AT HOME: NONE OF THE TIME
ACT_TOTALSCORE: 23
QUESTION_2 LAST FOUR WEEKS HOW OFTEN HAVE YOU HAD SHORTNESS OF BREATH: NOT AT ALL
QUESTION_3 LAST FOUR WEEKS HOW OFTEN DID YOUR ASTHMA SYMPTOMS (WHEEZING, COUGHING, SHORTNESS OF BREATH, CHEST TIGHTNESS OR PAIN) WAKE YOU UP AT NIGHT OR EARLIER THAN USUAL IN THE MORNING: NOT AT ALL
QUESTION_5 LAST FOUR WEEKS HOW WOULD YOU RATE YOUR ASTHMA CONTROL: COMPLETELY CONTROLLED
QUESTION_4 LAST FOUR WEEKS HOW OFTEN HAVE YOU USED YOUR RESCUE INHALER OR NEBULIZER MEDICATION (SUCH AS ALBUTEROL): TWO OR THREE TIMES PER WEEK

## 2025-01-16 ASSESSMENT — ANXIETY QUESTIONNAIRES
3. WORRYING TOO MUCH ABOUT DIFFERENT THINGS: NOT AT ALL
7. FEELING AFRAID AS IF SOMETHING AWFUL MIGHT HAPPEN: NOT AT ALL
2. NOT BEING ABLE TO STOP OR CONTROL WORRYING: NOT AT ALL
1. FEELING NERVOUS, ANXIOUS, OR ON EDGE: NOT AT ALL
GAD7 TOTAL SCORE: 0
5. BEING SO RESTLESS THAT IT IS HARD TO SIT STILL: NOT AT ALL
GAD7 TOTAL SCORE: 0
IF YOU CHECKED OFF ANY PROBLEMS ON THIS QUESTIONNAIRE, HOW DIFFICULT HAVE THESE PROBLEMS MADE IT FOR YOU TO DO YOUR WORK, TAKE CARE OF THINGS AT HOME, OR GET ALONG WITH OTHER PEOPLE: NOT DIFFICULT AT ALL
7. FEELING AFRAID AS IF SOMETHING AWFUL MIGHT HAPPEN: NOT AT ALL
4. TROUBLE RELAXING: NOT AT ALL
6. BECOMING EASILY ANNOYED OR IRRITABLE: NOT AT ALL
8. IF YOU CHECKED OFF ANY PROBLEMS, HOW DIFFICULT HAVE THESE MADE IT FOR YOU TO DO YOUR WORK, TAKE CARE OF THINGS AT HOME, OR GET ALONG WITH OTHER PEOPLE?: NOT DIFFICULT AT ALL
GAD7 TOTAL SCORE: 0

## 2025-01-16 ASSESSMENT — PATIENT HEALTH QUESTIONNAIRE - PHQ9
SUM OF ALL RESPONSES TO PHQ QUESTIONS 1-9: 0
10. IF YOU CHECKED OFF ANY PROBLEMS, HOW DIFFICULT HAVE THESE PROBLEMS MADE IT FOR YOU TO DO YOUR WORK, TAKE CARE OF THINGS AT HOME, OR GET ALONG WITH OTHER PEOPLE: NOT DIFFICULT AT ALL
SUM OF ALL RESPONSES TO PHQ QUESTIONS 1-9: 0

## 2025-01-16 NOTE — PROGRESS NOTES
Assessment & Plan     Moderate persistent asthma without complication  Doing well since he has recovered from upper respiratory infections recently.  Refilled medications as requested.  Follow-up in 6 months is advised.  Reviewed his asthma action plan he does not need a new one.   - fluticasone-salmeterol (ADVAIR HFA) 115-21 MCG/ACT inhaler; Inhale 2 puffs into the lungs 2 times daily.  - albuterol (PROAIR HFA/PROVENTIL HFA/VENTOLIN HFA) 108 (90 Base) MCG/ACT inhaler; INHALE 2 PUFFS BY MOUTH EVERY 6 HOURS AS NEEDED FOR SHORTNESS OF BREATH OR DIFFICULT BREATHING OR WHEEZING  - montelukast (SINGULAIR) 10 MG tablet; Take 1 tablet (10 mg) by mouth at bedtime.    Solar keratosis - facial  3 areas of what I believed to be solar keratosis 1 to the left upper ear and then 1 to the either side of the nasal bridge are noted today.  He has many areas of telangiectasia as well and it is obvious that he spends a fair amount of time out in the sun.  Advised that he take a careful inventory of what his priorities and goals are related to skin care and health.  He requests a follow-up dermatology consultation regarding the suspected solar keratosis and for a general skin check .  - Adult Dermatology  Referral; Future    ALBERTO (obstructive sleep apnea)  stable at this point in time with no new concerns.  Follow-upPRN        Alma Covarrubias is a 49 year old, presenting for the following health issues:  Asthma (Follow-up) and Derm referral      1/16/2025     3:23 PM   Additional Questions   Roomed by Gabi VU   Accompanied by self     History of Present Illness     Asthma:  He presents for follow up of asthma.  He has some cough, no wheezing, and no shortness of breath.  He is using a relief medication a few times a week. He typically misses taking his controller medication 2 time(s) per week. Patient is aware of the following triggers: none. The patient has not had a visit to the Emergency Room, Urgent Care or Hospital due  to asthma since the last clinic visit.     He eats 2-3 servings of fruits and vegetables daily.He consumes 1 sweetened beverage(s) daily.He exercises with enough effort to increase his heart rate 9 or less minutes per day.  He exercises with enough effort to increase his heart rate 3 or less days per week. He is missing 1 dose(s) of medications per week.  He is not taking prescribed medications regularly due to remembering to take.     Also needs a derm referral.        Review of Systems  Constitutional, HEENT, cardiovascular, pulmonary, GI, , musculoskeletal, neuro, skin, endocrine and psych systems are negative, except as otherwise noted.      Objective    /76   Pulse 92   Temp 97.8  F (36.6  C) (Temporal)   Resp 18   Wt 94.3 kg (208 lb)   SpO2 97%   BMI 30.73 kg/m    Body mass index is 30.73 kg/m .  Physical Exam   GENERAL: alert and no distress  EYES: Eyes grossly normal to inspection, PERRL and conjunctivae and sclerae normal  HENT: ear canals and TM's normal, nose and mouth without ulcers or lesions  NECK: no adenopathy, no asymmetry, masses, or scars  RESP: lungs rotation reveal some wheezes on the right that are not on the left.  With a good congested nonproductive cough these clear.  CV: regular rate and rhythm, normal S1 S2, no S3 or S4, no murmur, click or rub, no peripheral edema  SKIN: no suspicious lesions or rashes though I suspect that he has some solar keratosis to the left upper ear and to the bilateral nasal bridge.  Many areas of telangiectasia are noted to the face.  It is obvious that he spends a fair amount of time in the sun.    NEURO: Normal strength and tone, mentation intact and speech normal  PSYCH: mentation appears normal, affect normal/bright    No results found for this or any previous visit (from the past 24 hours).        Signed Electronically by: Narinder Martinez PA-C

## 2025-01-20 ENCOUNTER — TELEPHONE (OUTPATIENT)
Dept: FAMILY MEDICINE | Facility: OTHER | Age: 50
End: 2025-01-20
Payer: COMMERCIAL

## 2025-01-20 DIAGNOSIS — J45.40 MODERATE PERSISTENT ASTHMA WITHOUT COMPLICATION: Primary | ICD-10-CM

## 2025-01-20 NOTE — TELEPHONE ENCOUNTER
fluticasone-salmeterol (ADVAIR HFA) 115-21 MCG/ACT inhaler     Not covered , preferred alternatives :     Symbicortaer    Breoelliptainh    Advairhfa aaer    Fluticsalniurka Sosa

## 2025-01-21 ENCOUNTER — PATIENT OUTREACH (OUTPATIENT)
Dept: CARE COORDINATION | Facility: CLINIC | Age: 50
End: 2025-01-21
Payer: COMMERCIAL

## 2025-01-21 RX ORDER — FLUTICASONE PROPIONATE AND SALMETEROL 100; 50 UG/1; UG/1
1 POWDER RESPIRATORY (INHALATION) EVERY 12 HOURS
Qty: 60 EACH | Refills: 5 | Status: SHIPPED | OUTPATIENT
Start: 2025-01-21

## 2025-03-08 ENCOUNTER — HEALTH MAINTENANCE LETTER (OUTPATIENT)
Age: 50
End: 2025-03-08

## 2025-04-28 DIAGNOSIS — J45.40 MODERATE PERSISTENT ASTHMA WITHOUT COMPLICATION: ICD-10-CM

## 2025-04-28 RX ORDER — ALBUTEROL SULFATE 90 UG/1
INHALANT RESPIRATORY (INHALATION)
Qty: 6.7 G | Refills: 2 | Status: SHIPPED | OUTPATIENT
Start: 2025-04-28

## 2025-07-20 DIAGNOSIS — J45.40 MODERATE PERSISTENT ASTHMA WITHOUT COMPLICATION: ICD-10-CM

## 2025-07-21 RX ORDER — FLUTICASONE PROPIONATE AND SALMETEROL XINAFOATE 115; 21 UG/1; UG/1
2 AEROSOL, METERED RESPIRATORY (INHALATION) 2 TIMES DAILY
Qty: 12 G | Refills: 3 | Status: SHIPPED | OUTPATIENT
Start: 2025-07-21

## 2025-08-03 DIAGNOSIS — J45.40 MODERATE PERSISTENT ASTHMA WITHOUT COMPLICATION: ICD-10-CM

## 2025-08-04 RX ORDER — ALBUTEROL SULFATE 90 UG/1
INHALANT RESPIRATORY (INHALATION)
Qty: 6.7 G | Refills: 2 | Status: SHIPPED | OUTPATIENT
Start: 2025-08-04

## 2025-08-05 ENCOUNTER — PATIENT OUTREACH (OUTPATIENT)
Dept: CARE COORDINATION | Facility: CLINIC | Age: 50
End: 2025-08-05
Payer: COMMERCIAL

## (undated) RX ORDER — LIDOCAINE HYDROCHLORIDE 10 MG/ML
INJECTION, SOLUTION EPIDURAL; INFILTRATION; INTRACAUDAL; PERINEURAL
Status: DISPENSED
Start: 2019-11-20